# Patient Record
Sex: FEMALE | Race: ASIAN | Employment: FULL TIME | URBAN - METROPOLITAN AREA
[De-identification: names, ages, dates, MRNs, and addresses within clinical notes are randomized per-mention and may not be internally consistent; named-entity substitution may affect disease eponyms.]

---

## 2017-03-15 ENCOUNTER — EMPLOYEE WELLNESS (OUTPATIENT)
Dept: OTHER | Age: 32
End: 2017-03-15

## 2017-03-15 LAB
CHOLESTEROL/HDL RATIO: 4.4
CHOLESTEROL: 202 MG/DL
GLUCOSE BLD-MCNC: 93 MG/DL (ref 70–99)
HDLC SERPL-MCNC: 46 MG/DL
LDL CHOLESTEROL: 119 MG/DL (ref 0–130)
PATIENT FASTING?: YES
TRIGL SERPL-MCNC: 183 MG/DL
VLDLC SERPL CALC-MCNC: ABNORMAL MG/DL (ref 1–30)

## 2017-09-08 ENCOUNTER — OFFICE VISIT (OUTPATIENT)
Dept: OBGYN CLINIC | Age: 32
End: 2017-09-08
Payer: COMMERCIAL

## 2017-09-08 ENCOUNTER — HOSPITAL ENCOUNTER (OUTPATIENT)
Age: 32
Setting detail: SPECIMEN
Discharge: HOME | End: 2017-09-08
Payer: COMMERCIAL

## 2017-09-08 VITALS
DIASTOLIC BLOOD PRESSURE: 80 MMHG | SYSTOLIC BLOOD PRESSURE: 125 MMHG | HEIGHT: 62 IN | HEART RATE: 85 BPM | WEIGHT: 125 LBS | BODY MASS INDEX: 23 KG/M2

## 2017-09-08 DIAGNOSIS — Z32.01 POSITIVE PREGNANCY TEST: ICD-10-CM

## 2017-09-08 DIAGNOSIS — N91.2 AMENORRHEA: ICD-10-CM

## 2017-09-08 DIAGNOSIS — N91.2 AMENORRHEA: Primary | ICD-10-CM

## 2017-09-08 LAB
-: ABNORMAL
AMORPHOUS: ABNORMAL
AMPHETAMINE SCREEN URINE: NEGATIVE
BACTERIA: ABNORMAL
BARBITURATE SCREEN URINE: NEGATIVE
BENZODIAZEPINE SCREEN, URINE: NEGATIVE
BILIRUBIN URINE: NEGATIVE
BUPRENORPHINE URINE: NORMAL
CANNABINOID SCREEN URINE: NEGATIVE
CASTS UA: ABNORMAL /LPF (ref 0–2)
COCAINE METABOLITE, URINE: NEGATIVE
COLOR: ABNORMAL
COMMENT UA: ABNORMAL
CONTROL: ABNORMAL
CRYSTALS, UA: ABNORMAL /HPF
DIRECT EXAM: NORMAL
EPITHELIAL CELLS UA: ABNORMAL /HPF (ref 0–5)
GLUCOSE URINE: NEGATIVE
KETONES, URINE: ABNORMAL
LEUKOCYTE ESTERASE, URINE: ABNORMAL
Lab: NORMAL
MDMA URINE: NORMAL
METHADONE SCREEN, URINE: NEGATIVE
METHAMPHETAMINE, URINE: NORMAL
MUCUS: ABNORMAL
NITRITE, URINE: NEGATIVE
OPIATES, URINE: NEGATIVE
OTHER OBSERVATIONS UA: ABNORMAL
OXYCODONE SCREEN URINE: NEGATIVE
PH UA: 5.5 (ref 5–8)
PHENCYCLIDINE, URINE: NEGATIVE
PREGNANCY TEST URINE, POC: ABNORMAL
PROPOXYPHENE, URINE: NORMAL
PROTEIN UA: NEGATIVE
RBC UA: ABNORMAL /HPF (ref 0–2)
RENAL EPITHELIAL, UA: ABNORMAL /HPF
SPECIFIC GRAVITY UA: 1.03 (ref 1–1.03)
SPECIMEN DESCRIPTION: NORMAL
STATUS: NORMAL
TEST INFORMATION: NORMAL
TRICHOMONAS: ABNORMAL
TRICYCLIC ANTIDEPRESSANTS, UR: NORMAL
TURBIDITY: ABNORMAL
URINE HGB: NEGATIVE
UROBILINOGEN, URINE: NORMAL
WBC UA: ABNORMAL /HPF (ref 0–5)
YEAST: ABNORMAL

## 2017-09-08 PROCEDURE — 99203 OFFICE O/P NEW LOW 30 MIN: CPT | Performed by: OBSTETRICS & GYNECOLOGY

## 2017-09-08 PROCEDURE — 81025 URINE PREGNANCY TEST: CPT | Performed by: OBSTETRICS & GYNECOLOGY

## 2017-09-09 LAB
CULTURE: NO GROWTH
CULTURE: NORMAL
Lab: NORMAL
SPECIMEN DESCRIPTION: NORMAL
STATUS: NORMAL

## 2017-09-11 LAB
C TRACH DNA GENITAL QL NAA+PROBE: NEGATIVE
N. GONORRHOEAE DNA: NEGATIVE

## 2017-09-13 ENCOUNTER — HOSPITAL ENCOUNTER (OUTPATIENT)
Dept: ULTRASOUND IMAGING | Facility: CLINIC | Age: 32
Discharge: HOME | End: 2017-09-13
Payer: COMMERCIAL

## 2017-09-13 DIAGNOSIS — Z32.01 POSITIVE PREGNANCY TEST: ICD-10-CM

## 2017-09-13 DIAGNOSIS — N91.2 AMENORRHEA: ICD-10-CM

## 2017-09-13 PROCEDURE — 76801 OB US < 14 WKS SINGLE FETUS: CPT

## 2017-09-13 PROCEDURE — 76817 TRANSVAGINAL US OBSTETRIC: CPT

## 2017-09-14 LAB — CYTOLOGY REPORT: NORMAL

## 2017-09-21 ENCOUNTER — HOSPITAL ENCOUNTER (OUTPATIENT)
Age: 32
Setting detail: SPECIMEN
Discharge: HOME | End: 2017-09-21
Payer: COMMERCIAL

## 2017-09-21 DIAGNOSIS — Z32.01 POSITIVE PREGNANCY TEST: ICD-10-CM

## 2017-09-21 DIAGNOSIS — N91.2 AMENORRHEA: ICD-10-CM

## 2017-09-21 LAB
ABO/RH: NORMAL
ABSOLUTE EOS #: 0.1 K/UL (ref 0–0.4)
ABSOLUTE LYMPH #: 2.3 K/UL (ref 1–4.8)
ABSOLUTE MONO #: 0.7 K/UL (ref 0.1–1.2)
ANTIBODY SCREEN: NEGATIVE
BASOPHILS # BLD: 1 %
BASOPHILS ABSOLUTE: 0 K/UL (ref 0–0.2)
DIFFERENTIAL TYPE: NORMAL
EOSINOPHILS RELATIVE PERCENT: 1 %
HCT VFR BLD CALC: 40.1 % (ref 36–46)
HEMOGLOBIN: 13.7 G/DL (ref 12–16)
HEPATITIS B SURFACE ANTIGEN: NONREACTIVE
HIV AG/AB: NONREACTIVE
LYMPHOCYTES # BLD: 22 %
MCH RBC QN AUTO: 32.6 PG (ref 26–34)
MCHC RBC AUTO-ENTMCNC: 34 G/DL (ref 31–37)
MCV RBC AUTO: 95.8 FL (ref 80–100)
MONOCYTES # BLD: 6 %
PDW BLD-RTO: 13 % (ref 12.5–15.4)
PLATELET # BLD: 169 K/UL (ref 140–450)
PLATELET ESTIMATE: NORMAL
PMV BLD AUTO: 10 FL (ref 6–12)
RBC # BLD: 4.19 M/UL (ref 4–5.2)
RBC # BLD: NORMAL 10*6/UL
RUBV IGG SER QL: 12.3 IU/ML
SEG NEUTROPHILS: 70 %
SEGMENTED NEUTROPHILS ABSOLUTE COUNT: 7.2 K/UL (ref 1.8–7.7)
T. PALLIDUM, IGG: NONREACTIVE
WBC # BLD: 10.3 K/UL (ref 3.5–11)
WBC # BLD: NORMAL 10*3/UL

## 2017-10-04 ENCOUNTER — INITIAL PRENATAL (OUTPATIENT)
Dept: OBGYN CLINIC | Age: 32
End: 2017-10-04

## 2017-10-04 VITALS
HEART RATE: 101 BPM | BODY MASS INDEX: 22.86 KG/M2 | SYSTOLIC BLOOD PRESSURE: 118 MMHG | DIASTOLIC BLOOD PRESSURE: 74 MMHG | WEIGHT: 125 LBS

## 2017-10-04 DIAGNOSIS — Z34.91 ENCOUNTER FOR SUPERVISION OF NORMAL PREGNANCY IN FIRST TRIMESTER, UNSPECIFIED GRAVIDITY: Primary | ICD-10-CM

## 2017-10-04 DIAGNOSIS — Z34.80 SUPERVISION OF OTHER NORMAL PREGNANCY, ANTEPARTUM: ICD-10-CM

## 2017-10-04 PROCEDURE — 0500F INITIAL PRENATAL CARE VISIT: CPT | Performed by: OBSTETRICS & GYNECOLOGY

## 2017-10-04 NOTE — PROGRESS NOTES
NOT REPORTED NONE    Other Observations UA NOT REPORTED NREQ    Yeast, UA NOT REPORTED NONE   Urine Culture    Collection Time: 09/08/17  9:00 PM   Result Value Ref Range    Specimen Description . Located within [facility] URINE     Special Requests NOT REPORTED     Culture NO GROWTH     Culture       [address] [phone]    Status FINAL 09/09/2017    VAGINITIS DNA PROBE    Collection Time: 09/08/17  9:00 PM   Result Value Ref Range    Specimen Description . VAGINA     Special Requests NOT REPORTED     Direct Exam NEGATIVE for Candida sp. Direct Exam NEGATIVE for Gardnerella vaginalis     Direct Exam NEGATIVE for Trichomonas vaginalis     Direct Exam       Method of testing is a DNA probe intended for detection and identification of    Direct Exam        Candida species, Gardnerella vaginalis, and Trichomonas vaginalis nucleic acid    Direct Exam        in vaginal fluid specimens from patients with symptoms of vaginitis/vaginosis.     Direct Exam       [address] [phone]    Status FINAL 09/08/2017    C.trachomatis N.gonorrhoeae DNA    Collection Time: 09/08/17  9:01 PM   Result Value Ref Range    C. trachomatis DNA NEGATIVE NEG    N. gonorrhoeae DNA NEGATIVE NEG   PRENATAL PROFILE I    Collection Time: 09/21/17  3:27 PM   Result Value Ref Range    WBC 10.3 3.5 - 11.0 k/uL    RBC 4.19 4.0 - 5.2 m/uL    Hemoglobin 13.7 12.0 - 16.0 g/dL    Hematocrit 40.1 36 - 46 %    MCV 95.8 80 - 100 fL    MCH 32.6 26 - 34 pg    MCHC 34.0 31 - 37 g/dL    RDW 13.0 12.5 - 15.4 %    Platelets 742 780 - 560 k/uL    MPV 10.0 6.0 - 12.0 fL    Rubella Antibody, IGG 12.3 IU/mL    Hepatitis B Surface Ag NONREACTIVE NR    Differential Type NOT REPORTED     Seg Neutrophils 70 %    Lymphocytes 22 %    Monocytes 6 %    Eosinophils % 1 %    Basophils 1 %    Segs Absolute 7.20 1.8 - 7.7 k/uL    Absolute Lymph # 2.30 1.0 - 4.8 k/uL    Absolute Mono # 0.70 0.1 - 1.2 k/uL Absolute Eos # 0.10 0.0 - 0.4 k/uL    Basophils # 0.00 0.0 - 0.2 k/uL    WBC Morphology NOT REPORTED     RBC Morphology NOT REPORTED     Platelet Estimate NOT REPORTED     T. pallidum, IgG NONREACTIVE NR   HIV Screen    Collection Time: 09/21/17  3:27 PM   Result Value Ref Range    HIV Ag/Ab NONREACTIVE NR   Prenatal type and screen    Collection Time: 09/21/17  3:27 PM   Result Value Ref Range    ABO/Rh B POSITIVE     Antibody Screen NEGATIVE        Past Medical History:   Diagnosis Date    Abnormal Pap smear 2007    [facility]    Adopted     Asthma     Tachycardia                                                                    Past Surgical History:   Procedure Laterality Date    DENTAL SURGERY      LEEP  8/2007    [facility]    TUBAL LIGATION      WRIST SURGERY       History reviewed. No pertinent family history. History   Smoking Status    Never Smoker   Smokeless Tobacco    Never Used     History   Alcohol Use No       MEDICATIONS:  Current Outpatient Prescriptions   Medication Sig Dispense Refill    Multiple Vitamin (MULTIVITAMIN PO) Take 1 tablet by mouth daily. No current facility-administered medications for this visit. ALLERGIES:  Bactrim [sulfamethoxazole-trimethoprim]    Reviewed global and practice OB care including nausea measures, nutrition, activities, warning signs, and contact information.  Offered cell free DNA screen,NT echo and WIC .    `--------------------------------------------------------------------------  Genetic Screening/Teratology Counseling  (Include patient, FOB or anyone in either family)    1) Patient's age 28 years or > at ANDREW: No  2) Thalassemia (Mediterranean, ): No  3) Neural Tube Defect:   No  4) Congenital heart defect:   Yes, nephew had surgery to close hole in heart  5) Trisomy (e.g. Down Syndrome):  No  6) Austen-sachs (Latter day, [address]): No  7) Multiple Births:    Yes, FOB's cousins  8) Sickle cell (disease or trait):  No  9) options.     Route of delivery and counseling on vaginal, operative vaginal, and  sections were completed with the risks of each to both the patient as well as her baby. The possibility of a blood transfusion was discussed as well. The patient was not opposed to receiving a transfusion if needed.     Nuchal translucency/Quad Evaluation and MSAFP single marker testing was reviewed in detail with attention to timing of testing and their windows. For patients beyond the gestational age for Nuchal translucency evaluation Quad testing was recommended. Timing for the Quad test was reviewed. Benefits of the above testing was reviewed. A second trimester amniocentesis was also made available to the patient. Risks, Benefits and non-invasive alternative testing was reviewed.      The literature regarding a questionable link to pitocin augmentation and induction of labor, the assistance of labor contractions and the initiation of contractions to help delivery, have been reviewed with the patient regarding the increased potential of having a  with Attention Deficit Hyperactivity Disorder and or Autism. These two disorders and the ramifications of their impact on a child and the family caring for that child has been reviewed with the patient in detail. She was given the risks, benefits and alternatives of the use of this medication. She has agreed to its use in the delivery of her unborn child if needed at the time of delivery, Yes.     The patient was questioned in detail regarding any genetic misnomer history, chromosomal abnormalities, or learning disabilities in herself, the father of the baby or their families.  SHE DENIED ANY HISTORY AS STATED ABOVE: Yes    -------------------------------------------------------------------------  Infection History:    1) Live with someone with TB/exposed to TB: No  2) Patient/partner has h/o genital herpes: No  3) Rash/viral illness since LMP:  No  4) History of

## 2017-10-04 NOTE — MR AVS SNAPSHOT
After Visit Summary             Frankey Clos   10/4/2017 2:30 PM   Initial Prenatal    Description:  Female : 1985   Provider:  Meet Welch MD   Department:  [facility] [place]              Your Follow-Up and Future Appointments         Below is a list of your follow-up and future appointments. This may not be a complete list as you may have made appointments directly with providers that we are not aware of or your providers may have made some for you. Please call your providers to confirm appointments. It is important to keep your appointments. Please bring your current insurance card, photo ID, co-pay, and all medication bottles to your appointment. If self-pay, payment is expected at the time of service. Your To-Do List     Future Appointments Provider Department Dept Phone    10/31/2017 3:15 PM Meet Welch MD [facility] [place] [facility] [phone]    Patients are asked to arrive 15 minutes prior to scheduled appointment time to complete paper work. Follow-Up    Return in about 4 weeks (around 2017) for NIRAV. Information from Your Visit        Department     Name Address Phone Fax    [address] [place] [address] [phone]      You Were Seen for:         Comments    Encounter for supervision of normal pregnancy in first trimester, unspecified    [[id number]]         Vital Signs     Blood Pressure Pulse Weight Last Menstrual Period Body Mass Index Smoking Status    118/74 101 125 lb (56.7 kg) 2017 22.86 kg/m2 Never Smoker         Medications and Orders      Your Current Medications Are              Multiple Vitamin (MULTIVITAMIN PO) Take 1 tablet by mouth daily.         Allergies              Bactrim [Sulfamethoxazole-trimethoprim] Hives      We Ordered/Performed the following           Kanwal Barrera MD, Maternal Fetal Medicine [place]* _____________________________________________________        Patient is under 18 - Guarantor:  No    LMP: Patient's last menstrual period was 06/25/2017. ANDREW: Estimated Date of Delivery: 4/1/18    Gestational Age: 14w3d (at time of referral)    Blood Type: [unfilled]      Multiple: No       Next visit:  Visit date not found    Comments: The patient can be scheduled with any member of the group, including the provider with the first available appointments. Additional Information        Basic Information     Date Of Birth Sex Race Ethnicity Preferred Language    1985 Female Other Non-/Non  English      Problem List as of 10/4/2017                 Supervision of other normal pregnancy, antepartum      Preventive Care        Date Due    Tetanus Combination Vaccine (1 - Tdap) 9/15/2005    Yearly Flu Vaccine (1) 9/1/2017    Pap Smear 9/8/2020            MyChart Signup           [employer] allows you to send messages to your doctor, view your test results, renew your prescriptions, schedule appointments, view visit notes, and more. How Do I Sign Up? 1. In your Internet browser, go to [email or web address]  2. Click on the Sign Up Now link in the Sign In box. You will see the New Member Sign Up page. 3. Enter your [employer] Access Code exactly as it appears below. You will not need to use this code after youve completed the sign-up process. If you do not sign up before the expiration date, you must request a new code. [employer] Access Code: [email or web address]  Expires: 11/25/2017  4:59 AM    4. Enter your Social Security Number ([id number]) and Date of Birth (mm/dd/yyyy) as indicated and click Submit. You will be taken to the next sign-up page. 5. Create a inSellyt ID. This will be your [employer] login ID and cannot be changed, so think of one that is secure and easy to remember. 6. Create a inSellyt password. You can change your password at any time. 7. Enter your Password Reset Question and Answer. This can be used at a later time if you forget your password. 8. Enter your e-mail address. You will receive e-mail notification when new information is available in [address] 9. Click Sign Up. You can now view your medical record. Additional Information  If you have questions, please contact the physician practice where you receive care. Remember, [employer] is NOT to be used for urgent needs. For medical emergencies, dial 911. For questions regarding your [employer] account call [phone]. If you have a clinical question, please call your doctor's office.

## 2017-10-31 ENCOUNTER — ROUTINE PRENATAL (OUTPATIENT)
Dept: OBGYN CLINIC | Age: 32
End: 2017-10-31
Payer: COMMERCIAL

## 2017-10-31 VITALS
HEART RATE: 89 BPM | BODY MASS INDEX: 23.16 KG/M2 | SYSTOLIC BLOOD PRESSURE: 116 MMHG | WEIGHT: 126.6 LBS | DIASTOLIC BLOOD PRESSURE: 70 MMHG

## 2017-10-31 DIAGNOSIS — O26.892 HEARTBURN DURING PREGNANCY IN SECOND TRIMESTER: Primary | ICD-10-CM

## 2017-10-31 DIAGNOSIS — R12 HEARTBURN DURING PREGNANCY IN SECOND TRIMESTER: Primary | ICD-10-CM

## 2017-10-31 PROCEDURE — 99213 OFFICE O/P EST LOW 20 MIN: CPT | Performed by: OBSTETRICS & GYNECOLOGY

## 2017-10-31 RX ORDER — FAMOTIDINE 20 MG/1
20 TABLET, FILM COATED ORAL 2 TIMES DAILY
Qty: 60 TABLET | Refills: 3 | Status: SHIPPED | OUTPATIENT
Start: 2017-10-31 | End: 2021-09-14

## 2017-11-20 ENCOUNTER — ROUTINE PRENATAL (OUTPATIENT)
Dept: PERINATAL CARE | Age: 32
End: 2017-11-20
Payer: COMMERCIAL

## 2017-11-20 VITALS
HEART RATE: 107 BPM | BODY MASS INDEX: 24.75 KG/M2 | TEMPERATURE: 98.3 F | DIASTOLIC BLOOD PRESSURE: 77 MMHG | RESPIRATION RATE: 16 BRPM | HEIGHT: 62 IN | SYSTOLIC BLOOD PRESSURE: 123 MMHG | WEIGHT: 134.5 LBS

## 2017-11-20 DIAGNOSIS — Z3A.21 21 WEEKS GESTATION OF PREGNANCY: ICD-10-CM

## 2017-11-20 DIAGNOSIS — Z98.890 HISTORY OF CERVICAL LEEP BIOPSY AFFECTING CARE OF MOTHER, ANTEPARTUM: ICD-10-CM

## 2017-11-20 DIAGNOSIS — O44.40 LOW IMPLANTATION OF PLACENTA WITHOUT HEMORRHAGE: Primary | ICD-10-CM

## 2017-11-20 DIAGNOSIS — O34.40 HISTORY OF CERVICAL LEEP BIOPSY AFFECTING CARE OF MOTHER, ANTEPARTUM: ICD-10-CM

## 2017-11-20 PROCEDURE — 76817 TRANSVAGINAL US OBSTETRIC: CPT | Performed by: OBSTETRICS & GYNECOLOGY

## 2017-11-20 PROCEDURE — 76811 OB US DETAILED SNGL FETUS: CPT | Performed by: OBSTETRICS & GYNECOLOGY

## 2017-11-20 NOTE — PATIENT INSTRUCTIONS
Pt to follow up with referring physician\Thank you for enrolling in [address] Please follow the instructions below to securely access your online medical record. [employer] allows you to send messages to your doctor, view your test results, renew your prescriptions, schedule appointments, and more. How Do I Sign Up? 1. In your Internet browser, go to [email or web address]  2. Click on the Sign Up Now link in the Sign In box. You will see the New Member Sign Up page. 3. Enter your [employer] Access Code exactly as it appears below. You will not need to use this code after youve completed the sign-up process. If you do not sign up before the expiration date, you must request a new code. [employer] Access Code: [email or web address]  Expires: 1/19/2018  5:00 AM    4. Enter your Social Security Number ([id number]) and Date of Birth (mm/dd/yyyy) as indicated and click Submit. You will be taken to the next sign-up page. 5. Create a [employer] ID. This will be your [employer] login ID and cannot be changed, so think of one that is secure and easy to remember. 6. Create a [employer] password. You can change your password at any time. 7. Enter your Password Reset Question and Answer. This can be used at a later time if you forget your password. 8. Enter your e-mail address. You will receive e-mail notification when new information is available in [address] 9. Click Sign Up. You can now view your medical record. Additional Information  If you have questions, please contact your physician practice where you receive care. Remember, [employer] is NOT to be used for urgent needs. For medical emergencies, dial 911.

## 2017-11-21 ENCOUNTER — ROUTINE PRENATAL (OUTPATIENT)
Dept: OBGYN CLINIC | Age: 32
End: 2017-11-21

## 2017-11-21 VITALS
SYSTOLIC BLOOD PRESSURE: 119 MMHG | HEART RATE: 90 BPM | WEIGHT: 131 LBS | BODY MASS INDEX: 23.96 KG/M2 | DIASTOLIC BLOOD PRESSURE: 69 MMHG

## 2017-11-21 DIAGNOSIS — Z34.92 PRENATAL CARE IN SECOND TRIMESTER: Primary | ICD-10-CM

## 2017-11-21 PROCEDURE — 0502F SUBSEQUENT PRENATAL CARE: CPT | Performed by: OBSTETRICS & GYNECOLOGY

## 2017-12-05 ENCOUNTER — ROUTINE PRENATAL (OUTPATIENT)
Dept: PERINATAL CARE | Age: 32
End: 2017-12-05
Payer: COMMERCIAL

## 2017-12-05 VITALS
SYSTOLIC BLOOD PRESSURE: 126 MMHG | DIASTOLIC BLOOD PRESSURE: 76 MMHG | TEMPERATURE: 98.4 F | HEART RATE: 102 BPM | WEIGHT: 136 LBS | RESPIRATION RATE: 18 BRPM | BODY MASS INDEX: 24.87 KG/M2

## 2017-12-05 DIAGNOSIS — O34.40 HISTORY OF CERVICAL LEEP BIOPSY AFFECTING CARE OF MOTHER, ANTEPARTUM: Primary | ICD-10-CM

## 2017-12-05 DIAGNOSIS — Z98.890 HISTORY OF CERVICAL LEEP BIOPSY AFFECTING CARE OF MOTHER, ANTEPARTUM: Primary | ICD-10-CM

## 2017-12-05 DIAGNOSIS — O44.40 LOW IMPLANTATION OF PLACENTA WITHOUT HEMORRHAGE: ICD-10-CM

## 2017-12-05 DIAGNOSIS — Z3A.23 23 WEEKS GESTATION OF PREGNANCY: ICD-10-CM

## 2017-12-05 PROCEDURE — 76817 TRANSVAGINAL US OBSTETRIC: CPT | Performed by: OBSTETRICS & GYNECOLOGY

## 2017-12-19 ENCOUNTER — ROUTINE PRENATAL (OUTPATIENT)
Dept: OBGYN CLINIC | Age: 32
End: 2017-12-19

## 2017-12-19 VITALS
SYSTOLIC BLOOD PRESSURE: 125 MMHG | HEART RATE: 108 BPM | BODY MASS INDEX: 25.06 KG/M2 | DIASTOLIC BLOOD PRESSURE: 73 MMHG | WEIGHT: 137 LBS

## 2017-12-19 DIAGNOSIS — Z34.80 SUPERVISION OF OTHER NORMAL PREGNANCY, ANTEPARTUM: Primary | ICD-10-CM

## 2017-12-19 PROCEDURE — 0502F SUBSEQUENT PRENATAL CARE: CPT | Performed by: OBSTETRICS & GYNECOLOGY

## 2017-12-19 NOTE — PROGRESS NOTES
Natasha Estevez is a  @ 25w2d who presents for NIRAV visit. She denies LOF, VB or Ctxs.  + FM. She denies any complaints.      O:  Vitals:    17 1500   BP: 125/73   Pulse: 108     Gen: NAD  Abd: soft, nontender, gravid  Ext:  no edema    FHT: 150  FH: 24 cm    A/P:  Patient Active Problem List   Diagnosis    Supervision of other normal pregnancy, antepartum    Low implantation of placenta without hemorrhage    History of cervical LEEP biopsy affecting care of mother, antepartum     Discussed s/sx that should prompt call to the office  Discussed kiдмитрий conte  RTC in 4 wks    Ruben Mantilla MD

## 2017-12-20 ENCOUNTER — ROUTINE PRENATAL (OUTPATIENT)
Dept: PERINATAL CARE | Age: 32
End: 2017-12-20
Payer: COMMERCIAL

## 2017-12-20 VITALS
TEMPERATURE: 98.1 F | RESPIRATION RATE: 16 BRPM | SYSTOLIC BLOOD PRESSURE: 127 MMHG | HEART RATE: 99 BPM | WEIGHT: 136 LBS | DIASTOLIC BLOOD PRESSURE: 74 MMHG | BODY MASS INDEX: 24.87 KG/M2

## 2017-12-20 DIAGNOSIS — Z36.4 ANTENATAL SCREENING FOR FETAL GROWTH RETARDATION USING ULTRASONICS: ICD-10-CM

## 2017-12-20 DIAGNOSIS — Z3A.25 25 WEEKS GESTATION OF PREGNANCY: ICD-10-CM

## 2017-12-20 DIAGNOSIS — Z98.890 HISTORY OF CERVICAL LEEP BIOPSY AFFECTING CARE OF MOTHER, ANTEPARTUM: ICD-10-CM

## 2017-12-20 DIAGNOSIS — O34.40 HISTORY OF CERVICAL LEEP BIOPSY AFFECTING CARE OF MOTHER, ANTEPARTUM: ICD-10-CM

## 2017-12-20 DIAGNOSIS — O44.40 LOW IMPLANTATION OF PLACENTA WITHOUT HEMORRHAGE: Primary | ICD-10-CM

## 2017-12-20 PROCEDURE — 76816 OB US FOLLOW-UP PER FETUS: CPT | Performed by: OBSTETRICS & GYNECOLOGY

## 2017-12-20 PROCEDURE — 76817 TRANSVAGINAL US OBSTETRIC: CPT | Performed by: OBSTETRICS & GYNECOLOGY

## 2018-01-13 ENCOUNTER — HOSPITAL ENCOUNTER (OUTPATIENT)
Facility: CLINIC | Age: 33
Discharge: HOME | End: 2018-01-13
Payer: COMMERCIAL

## 2018-01-13 DIAGNOSIS — Z34.80 SUPERVISION OF OTHER NORMAL PREGNANCY, ANTEPARTUM: ICD-10-CM

## 2018-01-13 LAB
ABSOLUTE EOS #: 0.09 K/UL (ref 0–0.44)
ABSOLUTE IMMATURE GRANULOCYTE: 0.08 K/UL (ref 0–0.3)
ABSOLUTE LYMPH #: 2.17 K/UL (ref 1.1–3.7)
ABSOLUTE MONO #: 0.55 K/UL (ref 0.1–1.2)
BASOPHILS # BLD: 0 % (ref 0–2)
BASOPHILS ABSOLUTE: 0.03 K/UL (ref 0–0.2)
DIFFERENTIAL TYPE: ABNORMAL
EOSINOPHILS RELATIVE PERCENT: 1 % (ref 1–4)
GLUCOSE ADMINISTRATION: ABNORMAL
GLUCOSE TOLERANCE SCREEN 50G: 184 MG/DL (ref 70–135)
HCT VFR BLD CALC: 38 % (ref 36.3–47.1)
HEMOGLOBIN: 12.8 G/DL (ref 11.9–15.1)
IMMATURE GRANULOCYTES: 1 %
LYMPHOCYTES # BLD: 20 % (ref 24–43)
MCH RBC QN AUTO: 32.6 PG (ref 25.2–33.5)
MCHC RBC AUTO-ENTMCNC: 33.7 G/DL (ref 28.4–34.8)
MCV RBC AUTO: 96.7 FL (ref 82.6–102.9)
MONOCYTES # BLD: 5 % (ref 3–12)
PDW BLD-RTO: 11.9 % (ref 11.8–14.4)
PLATELET # BLD: 122 K/UL (ref 138–453)
PLATELET ESTIMATE: ABNORMAL
PMV BLD AUTO: 11.7 FL (ref 8.1–13.5)
RBC # BLD: 3.93 M/UL (ref 3.95–5.11)
RBC # BLD: ABNORMAL 10*6/UL
SEG NEUTROPHILS: 73 % (ref 36–65)
SEGMENTED NEUTROPHILS ABSOLUTE COUNT: 8.16 K/UL (ref 1.5–8.1)
WBC # BLD: 11.1 K/UL (ref 3.5–11.3)
WBC # BLD: ABNORMAL 10*3/UL

## 2018-01-13 PROCEDURE — 85025 COMPLETE CBC W/AUTO DIFF WBC: CPT

## 2018-01-13 PROCEDURE — 82950 GLUCOSE TEST: CPT

## 2018-01-13 PROCEDURE — 36415 COLL VENOUS BLD VENIPUNCTURE: CPT

## 2018-01-15 DIAGNOSIS — O99.810 ABNORMAL GLUCOSE TOLERANCE TEST (GTT) DURING PREGNANCY, ANTEPARTUM: Primary | ICD-10-CM

## 2018-01-16 ENCOUNTER — ROUTINE PRENATAL (OUTPATIENT)
Dept: OBGYN CLINIC | Age: 33
End: 2018-01-16

## 2018-01-16 VITALS
BODY MASS INDEX: 25.97 KG/M2 | DIASTOLIC BLOOD PRESSURE: 74 MMHG | SYSTOLIC BLOOD PRESSURE: 114 MMHG | HEART RATE: 85 BPM | WEIGHT: 142 LBS

## 2018-01-16 DIAGNOSIS — Z34.93 PRENATAL CARE IN THIRD TRIMESTER: Primary | ICD-10-CM

## 2018-01-16 PROCEDURE — 0502F SUBSEQUENT PRENATAL CARE: CPT | Performed by: OBSTETRICS & GYNECOLOGY

## 2018-01-20 ENCOUNTER — HOSPITAL ENCOUNTER (OUTPATIENT)
Facility: CLINIC | Age: 33
Discharge: HOME | End: 2018-01-20
Payer: COMMERCIAL

## 2018-01-20 DIAGNOSIS — O99.810 ABNORMAL GLUCOSE TOLERANCE TEST (GTT) DURING PREGNANCY, ANTEPARTUM: ICD-10-CM

## 2018-01-20 LAB
3 HR GLUCOSE: 108 MG/DL (ref 65–139)
AMOUNT GLUCOSE GIVEN: 100 G
GLUCOSE FASTING: 88 MG/DL (ref 65–94)
GLUCOSE TOLERANCE TEST 1 HOUR: 212 MG/DL (ref 65–179)
GLUCOSE TOLERANCE TEST 2 HOUR: 157 MG/DL (ref 65–154)

## 2018-01-20 PROCEDURE — 82952 GTT-ADDED SAMPLES: CPT

## 2018-01-20 PROCEDURE — 82951 GLUCOSE TOLERANCE TEST (GTT): CPT

## 2018-01-20 PROCEDURE — 36415 COLL VENOUS BLD VENIPUNCTURE: CPT

## 2018-01-22 DIAGNOSIS — O24.410 DIET CONTROLLED GESTATIONAL DIABETES MELLITUS (GDM) IN THIRD TRIMESTER: Primary | ICD-10-CM

## 2018-01-24 ENCOUNTER — TELEPHONE (OUTPATIENT)
Dept: PERINATAL CARE | Age: 33
End: 2018-01-24

## 2018-01-24 NOTE — TELEPHONE ENCOUNTER
Rx called into the pharmacy for a glucometer, and supplies pt is to check her sugars 4 x a day with a month supply and 3 refills. Rx called in by NADEEM RANDALL

## 2018-01-31 ENCOUNTER — ROUTINE PRENATAL (OUTPATIENT)
Dept: OBGYN CLINIC | Age: 33
End: 2018-01-31

## 2018-01-31 ENCOUNTER — HOSPITAL ENCOUNTER (OUTPATIENT)
Dept: DIABETES SERVICES | Age: 33
Setting detail: THERAPIES SERIES
Discharge: HOME | End: 2018-01-31
Payer: COMMERCIAL

## 2018-01-31 VITALS
HEART RATE: 104 BPM | BODY MASS INDEX: 25.81 KG/M2 | SYSTOLIC BLOOD PRESSURE: 120 MMHG | WEIGHT: 141.09 LBS | DIASTOLIC BLOOD PRESSURE: 78 MMHG

## 2018-01-31 VITALS
HEART RATE: 121 BPM | SYSTOLIC BLOOD PRESSURE: 136 MMHG | DIASTOLIC BLOOD PRESSURE: 83 MMHG | BODY MASS INDEX: 25.79 KG/M2 | WEIGHT: 141 LBS

## 2018-01-31 DIAGNOSIS — Z34.93 PRENATAL CARE IN THIRD TRIMESTER: Primary | ICD-10-CM

## 2018-01-31 DIAGNOSIS — O24.410 DIET CONTROLLED GESTATIONAL DIABETES MELLITUS (GDM) IN THIRD TRIMESTER: ICD-10-CM

## 2018-01-31 PROCEDURE — G0108 DIAB MANAGE TRN  PER INDIV: HCPCS

## 2018-01-31 PROCEDURE — 0502F SUBSEQUENT PRENATAL CARE: CPT | Performed by: OBSTETRICS & GYNECOLOGY

## 2018-01-31 NOTE — PROGRESS NOTES
strategies. 1 1/31/18CB   Denies any Sx   Describe sick day guidelines & indications for  physician notification. Identify short term consequences of poor control. Prevention, detection & treatment of chronic complications:  Define the natural course of diabetes & describe the relationship of blood glucose levels to long term complications of diabetes. Identify preventative measures & standards of care. 1 1/31/18CB    Risk of type 2 DM for mother post pregnancy. Risks of high BG for Mom and baby reviewed. Developing strategies to address psychosocial issues:  Describe feelings about living with diabetes; Describe how stress, depression & anxiety affect blood glucose; Identify coping strategies; Identify support needed & support network available. 1 1/31/18CB   Expressed no worries at this time and will try to make changes and increase activity by walking dogs. 1/31/18CB   Developing strategies to promote health/change behavior: Identify 7 self-care behaviors; Personal health risk factors; Benefits, challenges & strategies for behavioral change;    1 1/31/18CB        Individualized goal selection. 1/31/18CB    My goal , to help me improve my health, I will:   1. Eat 3 meals and 3 snacks daily      2.  Check BG fasting and 2 hour pp and meals , record and take to follow -up appointments/ fax weekly to M       Identified Barriers to learning/adherence to self management plan:     [x]None  []Physical  []Visual  []Hearing  []Speech  []Emotional  []Cognitive    []Reading  []Language  []Accessibility  []Financial    Instruction Method: [x]Lecture/Discussion  []Power Point Presentation  [x]Handouts  []Return Demonstration      Education Materials/Equipment Provided:    []Self-Management - Initial assessment - Enrolment in to ADA  Where do I Begin, Living with Type 2 diabetes ADA home support program and handout for no concentrated sweets and diet meal planning basics, handout on diabetes

## 2018-01-31 NOTE — PROGRESS NOTES
Sue Roberts is a  @ 31w3d who presents for NIRAV visit. She denies LOF, VB or Ctxs.  + FM. She is doing well without any complaints.      O:  Vitals:    18 1124   BP: 136/83   Pulse: 121     Gen: NAD  Abd: soft, nontender, gravid  Ext:  No edema    FHT: 150  FH: 31 cm    A/P:  Patient Active Problem List   Diagnosis    Supervision of other normal pregnancy, antepartum    Low implantation of placenta without hemorrhage    Diet controlled gestational diabetes mellitus (GDM) in third trimester   Pt has diabetic education today  Discussed s/sx that should prompt call to the office  Discussed jimmy conte  RTC in 2 wks    Blanka Edward MD

## 2018-02-07 ENCOUNTER — HOSPITAL ENCOUNTER (OUTPATIENT)
Dept: DIABETES SERVICES | Age: 33
Setting detail: THERAPIES SERIES
Discharge: HOME | End: 2018-02-07
Payer: COMMERCIAL

## 2018-02-07 DIAGNOSIS — O24.410 DIET CONTROLLED GESTATIONAL DIABETES MELLITUS (GDM) IN THIRD TRIMESTER: Primary | ICD-10-CM

## 2018-02-07 PROCEDURE — G0108 DIAB MANAGE TRN  PER INDIV: HCPCS

## 2018-02-07 NOTE — PROGRESS NOTES
Nutrition and diabetes          Class 3 - Preventing Complications         Class 4 -  In depth Nutrition and sick day care        Class 5 - 3 month follow up / goal reassessment        Gestational - RN  1/31/18CB 3:00 4:00 x    Gestational - RD 2/7/18 JW 3:00 4:00 x    Individual MNT         Shared Med Appt         Yearly Follow-up        Meter Instrx        Insulin Instrx      []Pen  []Vial & Syringe      DSMS Support Plan:  Follow-up plan:   [] MNT referral request / Appointment    [] Annual update referral request and appointment  after      []ADA  Where do I Begin, Living with Type 2 diabetes ADA home support program     [] AADE Behavioral Goal tracker for mobile phone and tables     [] Internet web sites - [employer]    []  Support Group / Portia Molina [place] [phone]      []   Followap application  / scholarship program       Post Education Referrals:       []  [facility]  Management program   []Podiatry services at [facility] / Dr Greta Calderon and or Dr. Isrrael Martin   [] Luan Robert [address] by [facility] or [facility]   [] Dental care - [facility] or [facility] dental clinics    []Wound Care    []Other  Lazarus Reno, RD, LD

## 2018-02-09 ENCOUNTER — ROUTINE PRENATAL (OUTPATIENT)
Dept: PERINATAL CARE | Age: 33
End: 2018-02-09
Payer: COMMERCIAL

## 2018-02-09 VITALS
WEIGHT: 141 LBS | SYSTOLIC BLOOD PRESSURE: 121 MMHG | HEIGHT: 62 IN | RESPIRATION RATE: 16 BRPM | HEART RATE: 109 BPM | BODY MASS INDEX: 25.95 KG/M2 | DIASTOLIC BLOOD PRESSURE: 79 MMHG | TEMPERATURE: 98.3 F

## 2018-02-09 DIAGNOSIS — O24.419 GESTATIONAL DIABETES MELLITUS (GDM), ANTEPARTUM, GESTATIONAL DIABETES METHOD OF CONTROL UNSPECIFIED: Primary | ICD-10-CM

## 2018-02-09 DIAGNOSIS — Z3A.32 32 WEEKS GESTATION OF PREGNANCY: ICD-10-CM

## 2018-02-09 PROCEDURE — 99242 OFF/OP CONSLTJ NEW/EST SF 20: CPT | Performed by: OBSTETRICS & GYNECOLOGY

## 2018-02-12 ENCOUNTER — ROUTINE PRENATAL (OUTPATIENT)
Dept: OBGYN CLINIC | Age: 33
End: 2018-02-12

## 2018-02-12 VITALS
DIASTOLIC BLOOD PRESSURE: 80 MMHG | SYSTOLIC BLOOD PRESSURE: 113 MMHG | WEIGHT: 142.2 LBS | HEART RATE: 96 BPM | BODY MASS INDEX: 26.01 KG/M2

## 2018-02-12 DIAGNOSIS — Z34.93 PRENATAL CARE IN THIRD TRIMESTER: Primary | ICD-10-CM

## 2018-02-12 PROCEDURE — 0502F SUBSEQUENT PRENATAL CARE: CPT | Performed by: OBSTETRICS & GYNECOLOGY

## 2018-02-12 NOTE — PROGRESS NOTES
Ena Goff is a  @ 33w1d who presents for NIRAV visit. She denies LOF, VB or Ctxs.  + FM. She denies any complaints.       O:  Vitals:    18 1350   BP: 113/80   Pulse: 96     Gen: NAD  Abd: soft, nontender, gravid  Ext:  mild edema    FHT: 155  FH: 32 cm    A/P:  Patient Active Problem List   Diagnosis    Supervision of other normal pregnancy, antepartum    Low implantation of placenta without hemorrhage    Diet controlled gestational diabetes mellitus (GDM) in third trimester     Discussed s/sx that should prompt call to the office  Discussed jimmy conte  RTC in 2 wks    Jose Angel Pierson MD

## 2018-02-14 ENCOUNTER — ROUTINE PRENATAL (OUTPATIENT)
Dept: PERINATAL CARE | Age: 33
End: 2018-02-14
Payer: COMMERCIAL

## 2018-02-14 VITALS
HEART RATE: 101 BPM | WEIGHT: 143 LBS | BODY MASS INDEX: 26.16 KG/M2 | DIASTOLIC BLOOD PRESSURE: 79 MMHG | RESPIRATION RATE: 16 BRPM | TEMPERATURE: 97.8 F | SYSTOLIC BLOOD PRESSURE: 127 MMHG

## 2018-02-14 DIAGNOSIS — O44.40 LOW IMPLANTATION OF PLACENTA WITHOUT HEMORRHAGE: ICD-10-CM

## 2018-02-14 DIAGNOSIS — Z3A.33 33 WEEKS GESTATION OF PREGNANCY: ICD-10-CM

## 2018-02-14 DIAGNOSIS — Z03.72 SUSPECTED PLACENTAL PROBLEM NOT FOUND: ICD-10-CM

## 2018-02-14 DIAGNOSIS — Z13.89 ENCOUNTER FOR ROUTINE SCREENING FOR MALFORMATION USING ULTRASONICS: ICD-10-CM

## 2018-02-14 DIAGNOSIS — Z36.4 ANTENATAL SCREENING FOR FETAL GROWTH RETARDATION USING ULTRASONICS: ICD-10-CM

## 2018-02-14 DIAGNOSIS — O24.410 DIET CONTROLLED GESTATIONAL DIABETES MELLITUS (GDM) IN THIRD TRIMESTER: Primary | ICD-10-CM

## 2018-02-14 LAB
ABDOMINAL CIRCUMFERENCE: NORMAL CM
BIPARIETAL DIAMETER: NORMAL CM
ESTIMATED FETAL WEIGHT: NORMAL GRAMS
FEMORAL DIAMETER: NORMAL CM
HC/AC: NORMAL
HEAD CIRCUMFERENCE: NORMAL CM

## 2018-02-14 PROCEDURE — 76805 OB US >/= 14 WKS SNGL FETUS: CPT | Performed by: OBSTETRICS & GYNECOLOGY

## 2018-02-14 PROCEDURE — 76817 TRANSVAGINAL US OBSTETRIC: CPT | Performed by: OBSTETRICS & GYNECOLOGY

## 2018-02-14 PROCEDURE — 76819 FETAL BIOPHYS PROFIL W/O NST: CPT | Performed by: OBSTETRICS & GYNECOLOGY

## 2018-02-14 PROCEDURE — 99211 OFF/OP EST MAY X REQ PHY/QHP: CPT | Performed by: OBSTETRICS & GYNECOLOGY

## 2018-02-28 ENCOUNTER — ROUTINE PRENATAL (OUTPATIENT)
Dept: OBGYN CLINIC | Age: 33
End: 2018-02-28

## 2018-02-28 VITALS
SYSTOLIC BLOOD PRESSURE: 119 MMHG | HEART RATE: 95 BPM | BODY MASS INDEX: 26.78 KG/M2 | DIASTOLIC BLOOD PRESSURE: 74 MMHG | WEIGHT: 146.4 LBS

## 2018-02-28 DIAGNOSIS — Z34.93 PRENATAL CARE IN THIRD TRIMESTER: Primary | ICD-10-CM

## 2018-02-28 PROCEDURE — 0502F SUBSEQUENT PRENATAL CARE: CPT | Performed by: OBSTETRICS & GYNECOLOGY

## 2018-02-28 NOTE — PROGRESS NOTES
Macarena Tavera is a  @ 35w3d who presents for NIRAV visit. She denies LOF, VB or Ctxs.  + FM.   She denies any complaints, but says she is starting to feel more pregnant    O:  Vitals:    18 1504   BP: 119/74   Pulse: 95     Gen: NAD  Abd: soft, nontender, gravid  Ext:  no edema    FHT: 145  FH: 34 cm    A/P:  Patient Active Problem List   Diagnosis    Supervision of other normal pregnancy, antepartum    Low implantation of placenta without hemorrhage    Diet controlled gestational diabetes mellitus (GDM) in third trimester    Suspected placental problem not found     Discussed s/sx that should prompt call to the office  Discussed kiдмитрий conte  RTC in 1 wks    Dilma Benavides MD

## 2018-03-06 ENCOUNTER — HOSPITAL ENCOUNTER (OUTPATIENT)
Age: 33
Setting detail: SPECIMEN
Discharge: HOME | End: 2018-03-06
Payer: COMMERCIAL

## 2018-03-06 ENCOUNTER — ROUTINE PRENATAL (OUTPATIENT)
Dept: OBGYN CLINIC | Age: 33
End: 2018-03-06

## 2018-03-06 VITALS
BODY MASS INDEX: 26.89 KG/M2 | DIASTOLIC BLOOD PRESSURE: 80 MMHG | HEART RATE: 118 BPM | WEIGHT: 147 LBS | SYSTOLIC BLOOD PRESSURE: 128 MMHG

## 2018-03-06 DIAGNOSIS — Z34.80 SUPERVISION OF OTHER NORMAL PREGNANCY, ANTEPARTUM: ICD-10-CM

## 2018-03-06 DIAGNOSIS — Z34.80 SUPERVISION OF OTHER NORMAL PREGNANCY, ANTEPARTUM: Primary | ICD-10-CM

## 2018-03-06 PROCEDURE — 0502F SUBSEQUENT PRENATAL CARE: CPT | Performed by: OBSTETRICS & GYNECOLOGY

## 2018-03-09 LAB
CULTURE: NORMAL
CULTURE: NORMAL
Lab: NORMAL
SPECIMEN DESCRIPTION: NORMAL
STATUS: NORMAL

## 2018-03-13 ENCOUNTER — ROUTINE PRENATAL (OUTPATIENT)
Dept: OBGYN CLINIC | Age: 33
End: 2018-03-13

## 2018-03-13 VITALS — DIASTOLIC BLOOD PRESSURE: 80 MMHG | BODY MASS INDEX: 27.07 KG/M2 | SYSTOLIC BLOOD PRESSURE: 124 MMHG | WEIGHT: 148 LBS

## 2018-03-13 DIAGNOSIS — Z3A.37 37 WEEKS GESTATION OF PREGNANCY: Primary | ICD-10-CM

## 2018-03-13 DIAGNOSIS — O24.410 DIET CONTROLLED GESTATIONAL DIABETES MELLITUS (GDM) IN THIRD TRIMESTER: ICD-10-CM

## 2018-03-13 PROCEDURE — 0502F SUBSEQUENT PRENATAL CARE: CPT | Performed by: OBSTETRICS & GYNECOLOGY

## 2018-03-14 ENCOUNTER — ROUTINE PRENATAL (OUTPATIENT)
Dept: PERINATAL CARE | Age: 33
End: 2018-03-14
Payer: COMMERCIAL

## 2018-03-14 ENCOUNTER — HOSPITAL ENCOUNTER (INPATIENT)
Age: 33
LOS: 2 days | Discharge: HOME | End: 2018-03-17
Attending: OBSTETRICS & GYNECOLOGY | Admitting: OBSTETRICS & GYNECOLOGY
Payer: COMMERCIAL

## 2018-03-14 VITALS
TEMPERATURE: 97.6 F | RESPIRATION RATE: 18 BRPM | DIASTOLIC BLOOD PRESSURE: 90 MMHG | HEART RATE: 98 BPM | BODY MASS INDEX: 27.42 KG/M2 | WEIGHT: 149 LBS | HEIGHT: 62 IN | SYSTOLIC BLOOD PRESSURE: 148 MMHG

## 2018-03-14 DIAGNOSIS — O16.3 ELEVATED BLOOD PRESSURE COMPLICATING PREGNANCY, ANTEPARTUM, THIRD TRIMESTER: Primary | ICD-10-CM

## 2018-03-14 DIAGNOSIS — Z36.4 ANTENATAL SCREENING FOR FETAL GROWTH RETARDATION USING ULTRASONICS: ICD-10-CM

## 2018-03-14 DIAGNOSIS — Z3A.37 37 WEEKS GESTATION OF PREGNANCY: ICD-10-CM

## 2018-03-14 DIAGNOSIS — O24.410 DIET CONTROLLED GESTATIONAL DIABETES MELLITUS (GDM), ANTEPARTUM: ICD-10-CM

## 2018-03-14 PROBLEM — O09.93 HRP (HIGH RISK PREGNANCY), THIRD TRIMESTER: Status: ACTIVE | Noted: 2018-03-14

## 2018-03-14 LAB
-: ABNORMAL
ABO/RH: NORMAL
ABSOLUTE EOS #: 0.06 K/UL (ref 0–0.44)
ABSOLUTE IMMATURE GRANULOCYTE: 0.12 K/UL (ref 0–0.3)
ABSOLUTE LYMPH #: 2.66 K/UL (ref 1.1–3.7)
ABSOLUTE MONO #: 0.75 K/UL (ref 0.1–1.2)
ALBUMIN SERPL-MCNC: 3.4 G/DL (ref 3.5–5.2)
ALBUMIN/GLOBULIN RATIO: 1.1 (ref 1–2.5)
ALP BLD-CCNC: 144 U/L (ref 35–104)
ALT SERPL-CCNC: 13 U/L (ref 5–33)
AMORPHOUS: ABNORMAL
AMPHETAMINE SCREEN URINE: NEGATIVE
ANION GAP SERPL CALCULATED.3IONS-SCNC: 16 MMOL/L (ref 9–17)
ANTIBODY SCREEN: NEGATIVE
ARM BAND NUMBER: NORMAL
AST SERPL-CCNC: 20 U/L
BACTERIA: ABNORMAL
BARBITURATE SCREEN URINE: NEGATIVE
BASOPHILS # BLD: 0 % (ref 0–2)
BASOPHILS ABSOLUTE: 0.03 K/UL (ref 0–0.2)
BENZODIAZEPINE SCREEN, URINE: NEGATIVE
BILIRUB SERPL-MCNC: 0.6 MG/DL (ref 0.3–1.2)
BILIRUBIN URINE: NEGATIVE
BUN BLDV-MCNC: 11 MG/DL (ref 6–20)
BUN/CREAT BLD: ABNORMAL (ref 9–20)
BUPRENORPHINE URINE: NORMAL
CALCIUM SERPL-MCNC: 9 MG/DL (ref 8.6–10.4)
CANNABINOID SCREEN URINE: NEGATIVE
CASTS UA: ABNORMAL /LPF (ref 0–8)
CHLORIDE BLD-SCNC: 99 MMOL/L (ref 98–107)
CO2: 20 MMOL/L (ref 20–31)
COCAINE METABOLITE, URINE: NEGATIVE
COLOR: YELLOW
COMMENT UA: ABNORMAL
CREAT SERPL-MCNC: 0.42 MG/DL (ref 0.5–0.9)
CREATININE URINE: 59.4 MG/DL (ref 28–217)
CRYSTALS, UA: ABNORMAL /HPF
DIFFERENTIAL TYPE: ABNORMAL
EOSINOPHILS RELATIVE PERCENT: 1 % (ref 1–4)
EPITHELIAL CELLS UA: ABNORMAL /HPF (ref 0–5)
EXPIRATION DATE: NORMAL
GFR AFRICAN AMERICAN: >60 ML/MIN
GFR NON-AFRICAN AMERICAN: >60 ML/MIN
GFR SERPL CREATININE-BSD FRML MDRD: ABNORMAL ML/MIN/{1.73_M2}
GFR SERPL CREATININE-BSD FRML MDRD: ABNORMAL ML/MIN/{1.73_M2}
GLUCOSE BLD-MCNC: 73 MG/DL (ref 70–99)
GLUCOSE BLD-MCNC: 79 MG/DL (ref 65–105)
GLUCOSE URINE: NEGATIVE
HCT VFR BLD CALC: 38.2 % (ref 36.3–47.1)
HEMOGLOBIN: 12.4 G/DL (ref 11.9–15.1)
IMMATURE GRANULOCYTES: 1 %
KETONES, URINE: ABNORMAL
LACTATE DEHYDROGENASE: 185 U/L (ref 135–214)
LEUKOCYTE ESTERASE, URINE: ABNORMAL
LYMPHOCYTES # BLD: 23 % (ref 24–43)
MCH RBC QN AUTO: 28.4 PG (ref 25.2–33.5)
MCHC RBC AUTO-ENTMCNC: 32.5 G/DL (ref 28.4–34.8)
MCV RBC AUTO: 87.6 FL (ref 82.6–102.9)
MDMA URINE: NORMAL
METHADONE SCREEN, URINE: NEGATIVE
METHAMPHETAMINE, URINE: NORMAL
MONOCYTES # BLD: 7 % (ref 3–12)
MUCUS: ABNORMAL
NITRITE, URINE: NEGATIVE
NRBC AUTOMATED: 0 PER 100 WBC
OPIATES, URINE: NEGATIVE
OTHER OBSERVATIONS UA: ABNORMAL
OXYCODONE SCREEN URINE: NEGATIVE
PDW BLD-RTO: 13.3 % (ref 11.8–14.4)
PH UA: 6.5 (ref 5–8)
PHENCYCLIDINE, URINE: NEGATIVE
PLATELET # BLD: 90 K/UL (ref 138–453)
PLATELET # BLD: 98 K/UL (ref 138–453)
PLATELET ESTIMATE: ABNORMAL
PMV BLD AUTO: 11.7 FL (ref 8.1–13.5)
POTASSIUM SERPL-SCNC: 3.7 MMOL/L (ref 3.7–5.3)
PROPOXYPHENE, URINE: NORMAL
PROTEIN UA: NEGATIVE
RBC # BLD: 4.36 M/UL (ref 3.95–5.11)
RBC # BLD: ABNORMAL 10*6/UL
RBC UA: ABNORMAL /HPF (ref 0–4)
RENAL EPITHELIAL, UA: ABNORMAL /HPF
SEG NEUTROPHILS: 68 % (ref 36–65)
SEGMENTED NEUTROPHILS ABSOLUTE COUNT: 7.83 K/UL (ref 1.5–8.1)
SODIUM BLD-SCNC: 135 MMOL/L (ref 135–144)
SPECIFIC GRAVITY UA: 1.02 (ref 1–1.03)
T. PALLIDUM, IGG: NONREACTIVE
TEST INFORMATION: NORMAL
TOTAL PROTEIN, URINE: 14 MG/DL
TOTAL PROTEIN: 6.5 G/DL (ref 6.4–8.3)
TRICHOMONAS: ABNORMAL
TRICYCLIC ANTIDEPRESSANTS, UR: NORMAL
TURBIDITY: CLEAR
URIC ACID: 5.2 MG/DL (ref 2.4–5.7)
URINE HGB: NEGATIVE
URINE TOTAL PROTEIN CREATININE RATIO: 0.24 (ref 0–0.2)
UROBILINOGEN, URINE: NORMAL
WBC # BLD: 11.5 K/UL (ref 3.5–11.3)
WBC # BLD: ABNORMAL 10*3/UL
WBC UA: ABNORMAL /HPF (ref 0–5)
YEAST: ABNORMAL

## 2018-03-14 PROCEDURE — 86780 TREPONEMA PALLIDUM: CPT

## 2018-03-14 PROCEDURE — 86901 BLOOD TYPING SEROLOGIC RH(D): CPT

## 2018-03-14 PROCEDURE — 80307 DRUG TEST PRSMV CHEM ANLYZR: CPT

## 2018-03-14 PROCEDURE — 36415 COLL VENOUS BLD VENIPUNCTURE: CPT

## 2018-03-14 PROCEDURE — 83615 LACTATE (LD) (LDH) ENZYME: CPT

## 2018-03-14 PROCEDURE — 99211 OFF/OP EST MAY X REQ PHY/QHP: CPT | Performed by: OBSTETRICS & GYNECOLOGY

## 2018-03-14 PROCEDURE — 85025 COMPLETE CBC W/AUTO DIFF WBC: CPT

## 2018-03-14 PROCEDURE — 76820 UMBILICAL ARTERY ECHO: CPT | Performed by: OBSTETRICS & GYNECOLOGY

## 2018-03-14 PROCEDURE — 82947 ASSAY GLUCOSE BLOOD QUANT: CPT

## 2018-03-14 PROCEDURE — 76816 OB US FOLLOW-UP PER FETUS: CPT | Performed by: OBSTETRICS & GYNECOLOGY

## 2018-03-14 PROCEDURE — 76819 FETAL BIOPHYS PROFIL W/O NST: CPT | Performed by: OBSTETRICS & GYNECOLOGY

## 2018-03-14 PROCEDURE — 80053 COMPREHEN METABOLIC PANEL: CPT

## 2018-03-14 PROCEDURE — 85049 AUTOMATED PLATELET COUNT: CPT

## 2018-03-14 PROCEDURE — 86850 RBC ANTIBODY SCREEN: CPT

## 2018-03-14 PROCEDURE — 6360000002 HC RX W HCPCS: Performed by: STUDENT IN AN ORGANIZED HEALTH CARE EDUCATION/TRAINING PROGRAM

## 2018-03-14 PROCEDURE — 82570 ASSAY OF URINE CREATININE: CPT

## 2018-03-14 PROCEDURE — 86900 BLOOD TYPING SEROLOGIC ABO: CPT

## 2018-03-14 PROCEDURE — 87086 URINE CULTURE/COLONY COUNT: CPT

## 2018-03-14 PROCEDURE — 84550 ASSAY OF BLOOD/URIC ACID: CPT

## 2018-03-14 PROCEDURE — 2580000003 HC RX 258: Performed by: STUDENT IN AN ORGANIZED HEALTH CARE EDUCATION/TRAINING PROGRAM

## 2018-03-14 PROCEDURE — 2500000003 HC RX 250 WO HCPCS

## 2018-03-14 PROCEDURE — 81001 URINALYSIS AUTO W/SCOPE: CPT

## 2018-03-14 PROCEDURE — 84156 ASSAY OF PROTEIN URINE: CPT

## 2018-03-14 RX ORDER — DIPHENHYDRAMINE HCL 25 MG
25 TABLET ORAL EVERY 4 HOURS PRN
Status: DISCONTINUED | OUTPATIENT
Start: 2018-03-14 | End: 2018-03-15

## 2018-03-14 RX ORDER — SODIUM CHLORIDE, SODIUM LACTATE, POTASSIUM CHLORIDE, CALCIUM CHLORIDE 600; 310; 30; 20 MG/100ML; MG/100ML; MG/100ML; MG/100ML
INJECTION, SOLUTION INTRAVENOUS CONTINUOUS
Status: DISCONTINUED | OUTPATIENT
Start: 2018-03-14 | End: 2018-03-14

## 2018-03-14 RX ORDER — MAGNESIUM SULFATE IN WATER 40 MG/ML
4 INJECTION, SOLUTION INTRAVENOUS ONCE
Status: COMPLETED | OUTPATIENT
Start: 2018-03-14 | End: 2018-03-14

## 2018-03-14 RX ORDER — ACETAMINOPHEN 500 MG
1000 TABLET ORAL EVERY 6 HOURS PRN
Status: DISCONTINUED | OUTPATIENT
Start: 2018-03-14 | End: 2018-03-15

## 2018-03-14 RX ORDER — SODIUM CHLORIDE 9 MG/ML
INJECTION, SOLUTION INTRAVENOUS CONTINUOUS
Status: DISCONTINUED | OUTPATIENT
Start: 2018-03-14 | End: 2018-03-16

## 2018-03-14 RX ORDER — ONDANSETRON 2 MG/ML
4 INJECTION INTRAMUSCULAR; INTRAVENOUS EVERY 6 HOURS PRN
Status: DISCONTINUED | OUTPATIENT
Start: 2018-03-14 | End: 2018-03-15

## 2018-03-14 RX ADMIN — Medication 1 MILLI-UNITS/MIN: at 22:13

## 2018-03-14 RX ADMIN — SODIUM CHLORIDE: 9 INJECTION, SOLUTION INTRAVENOUS at 19:45

## 2018-03-14 RX ADMIN — MAGNESIUM SULFATE IN WATER 4 G: 40 INJECTION, SOLUTION INTRAVENOUS at 21:50

## 2018-03-14 RX ADMIN — MAGNESIUM SULFATE HEPTAHYDRATE 1 G/HR: 40 INJECTION, SOLUTION INTRAVENOUS at 22:07

## 2018-03-14 NOTE — H&P
List   Diagnosis    Supervision of other normal pregnancy, antepartum    Low implantation of placenta without hemorrhage    Diet controlled gestational diabetes mellitus (GDM) in third trimester    Suspected placental problem not found    Elevated blood pressure complicating pregnancy, antepartum, third trimester    HRP (high risk pregnancy), third trimester        Steroids Given In This Pregnancy:  no     REVIEW OF SYSTEMS:   Constitutional: negative fever, negative chills  HEENT: negative visual disturbances, negative headaches  Respiratory: negative dyspnea, negative cough  Cardiovascular: negative chest pain,  negative palpitations  Gastrointestinal: negative abdominal pain, negative RUQ pain, negative N/V, negative diarrhea, negative constipation  Genitourinary: negative dysuria, negative vaginal discharge  Dermatological: negative rash  Hematologic: negative bruising  Immunologic/Lymphatic: negative recent illness, negative recent sick contact  Musculoskeletal: negative back pain, negative myalgias, negative arthralgias  Neurological:  negative dizziness, negative weakness  Behavior/Psych: negative depression, negative anxiety      OBSTETRICAL HISTORY:   Obstetric History       T1      L1     SAB0   TAB0   Ectopic0   Molar0   Multiple0   Live Births1       # Outcome Date GA Lbr Tulio/2nd Weight Sex Delivery Anes PTL Lv   2 Current            1 Term 03/10/06   7 lb 1 oz (3.204 kg) M Vag-Spont   REED          PAST MEDICAL HISTORY:   has a past medical history of Abnormal Pap smear; Adopted; Asthma; and Tachycardia. PAST SURGICAL HISTORY:   has a past surgical history that includes LEEP (2007); Tubal ligation; Dental surgery; and Wrist surgery. ALLERGIES:  is allergic to bactrim [sulfamethoxazole-trimethoprim]. MEDICATIONS:  Prior to Admission medications    Medication Sig Start Date End Date Taking?  Authorizing Provider   Prenatal Vit w/Mm-Natxprmat-UF (PNV PO) Take by mouth Hepatitis B Surface Antigen: non-reactive   HIV: non-reactive   Sickle Cell Screen: not done  Gonorrhea: negative  Chlamydia: negative  Urine culture: negative, date: 17    1 hour Glucose Tolerance Test: 184  3 hour Glucose Tolerance Test: Fastin; 1 hour: 212; 2 hour  157; 3 hour: 108    Group B Strep: negative  Cystic Fibrosis Screen: not available  First Trimester Screen: not available  MSAFP/Multiple Markers: not available  Non-Invasive Prenatal Testing: not available  Anatomy US: normal, posterior, 3vc, female    ASSESSMENT & PLAN:  Beba Fairbanks is a 33 y.o. female  at 37w3d IOL 2/2 preeclampsia w/ SF (Platelets 90)   - GBS negative / Rh positive / R immune   - No indication for GBS prophylaxis   - Admit to L&D    - CEFM/TOCO   - CBC, T&S, T. Pall ordered   - PreE labs wnl except low platelets   - P/C ratio pending   - UA w/ reflex   - Urine drug screen ordered   -  Informed consent obtained. Discussed R/B/A. All questions and concerns answered. Patient verbalized understanding and agreement to plan.     - IV fluid - NS @75cc/hr   - Strict I's/O's   - Denies s/s of preeclampsia including HA, VC, RUQ pain or worsening swelling   - Magnesium bolus and continuous infusion   - Induction method: Pitocin    Elevated BPs   - Elevated BP of 140/90s in MFM office   - No BPs have been severe range     GDMA1   - Diagnosed by elevated 1 hr, failed 3hr   - Patient was seen by M today who started her on  NPH Insulin subcutaneously/SQ 8 units before bedtime  (consistently elevated fasting blood sugars above 90's) and 4 units subcutaneous (SQ) Novolog before dinner (consistently elevated postprandial dinner values)   - Blood sugar checks every 4hrs, will change to hourly when in active labor    - Glucose on CMP on admission was 73    Patient Active Problem List    Diagnosis Date Noted    Elevated blood pressure complicating pregnancy, antepartum, third trimester 2018    HRP (high risk pregnancy),

## 2018-03-15 ENCOUNTER — APPOINTMENT (OUTPATIENT)
Dept: GENERAL RADIOLOGY | Age: 33
End: 2018-03-15
Payer: COMMERCIAL

## 2018-03-15 PROBLEM — O09.90 HIGH RISK PREGNANCY, ANTEPARTUM: Status: ACTIVE | Noted: 2018-03-15

## 2018-03-15 LAB
ABSOLUTE EOS #: <0.03 K/UL (ref 0–0.44)
ABSOLUTE IMMATURE GRANULOCYTE: 0.12 K/UL (ref 0–0.3)
ABSOLUTE LYMPH #: 2.42 K/UL (ref 1.1–3.7)
ABSOLUTE MONO #: 1.28 K/UL (ref 0.1–1.2)
BASOPHILS # BLD: 0 % (ref 0–2)
BASOPHILS ABSOLUTE: 0.03 K/UL (ref 0–0.2)
CULTURE: NORMAL
CULTURE: NORMAL
DIFFERENTIAL TYPE: ABNORMAL
EKG ATRIAL RATE: 99 BPM
EKG P AXIS: 36 DEGREES
EKG P-R INTERVAL: 148 MS
EKG Q-T INTERVAL: 352 MS
EKG QRS DURATION: 82 MS
EKG QTC CALCULATION (BAZETT): 451 MS
EKG R AXIS: 61 DEGREES
EKG T AXIS: 33 DEGREES
EKG VENTRICULAR RATE: 99 BPM
EOSINOPHILS RELATIVE PERCENT: 0 % (ref 1–4)
HCT VFR BLD CALC: 32.5 % (ref 36.3–47.1)
HEMOGLOBIN: 10.5 G/DL (ref 11.9–15.1)
IMMATURE GRANULOCYTES: 1 %
LYMPHOCYTES # BLD: 13 % (ref 24–43)
Lab: NORMAL
MAGNESIUM: 6.1 MG/DL (ref 1.6–2.6)
MCH RBC QN AUTO: 28.6 PG (ref 25.2–33.5)
MCHC RBC AUTO-ENTMCNC: 32.3 G/DL (ref 28.4–34.8)
MCV RBC AUTO: 88.6 FL (ref 82.6–102.9)
MONOCYTES # BLD: 7 % (ref 3–12)
NRBC AUTOMATED: 0 PER 100 WBC
PDW BLD-RTO: 13.5 % (ref 11.8–14.4)
PLATELET # BLD: 88 K/UL (ref 138–453)
PLATELET ESTIMATE: ABNORMAL
PMV BLD AUTO: 12.1 FL (ref 8.1–13.5)
RBC # BLD: 3.67 M/UL (ref 3.95–5.11)
RBC # BLD: ABNORMAL 10*6/UL
SEG NEUTROPHILS: 79 % (ref 36–65)
SEGMENTED NEUTROPHILS ABSOLUTE COUNT: 14.61 K/UL (ref 1.5–8.1)
SPECIMEN DESCRIPTION: NORMAL
STATUS: NORMAL
WBC # BLD: 18.5 K/UL (ref 3.5–11.3)
WBC # BLD: ABNORMAL 10*3/UL

## 2018-03-15 PROCEDURE — 6360000002 HC RX W HCPCS

## 2018-03-15 PROCEDURE — 85025 COMPLETE CBC W/AUTO DIFF WBC: CPT

## 2018-03-15 PROCEDURE — 83735 ASSAY OF MAGNESIUM: CPT

## 2018-03-15 PROCEDURE — 59050 FETAL MONITOR W/REPORT: CPT

## 2018-03-15 PROCEDURE — 10907ZC DRAINAGE OF AMNIOTIC FLUID, THERAPEUTIC FROM PRODUCTS OF CONCEPTION, VIA NATURAL OR ARTIFICIAL OPENING: ICD-10-PCS | Performed by: OBSTETRICS & GYNECOLOGY

## 2018-03-15 PROCEDURE — 88307 TISSUE EXAM BY PATHOLOGIST: CPT

## 2018-03-15 PROCEDURE — 96365 THER/PROPH/DIAG IV INF INIT: CPT

## 2018-03-15 PROCEDURE — 7200000001 HC VAGINAL DELIVERY

## 2018-03-15 PROCEDURE — 6360000002 HC RX W HCPCS: Performed by: STUDENT IN AN ORGANIZED HEALTH CARE EDUCATION/TRAINING PROGRAM

## 2018-03-15 PROCEDURE — 71046 X-RAY EXAM CHEST 2 VIEWS: CPT

## 2018-03-15 PROCEDURE — 1220000000 HC SEMI PRIVATE OB R&B

## 2018-03-15 PROCEDURE — 6370000000 HC RX 637 (ALT 250 FOR IP): Performed by: STUDENT IN AN ORGANIZED HEALTH CARE EDUCATION/TRAINING PROGRAM

## 2018-03-15 PROCEDURE — 3E033VJ INTRODUCTION OF OTHER HORMONE INTO PERIPHERAL VEIN, PERCUTANEOUS APPROACH: ICD-10-PCS | Performed by: OBSTETRICS & GYNECOLOGY

## 2018-03-15 PROCEDURE — 59400 OBSTETRICAL CARE: CPT | Performed by: OBSTETRICS & GYNECOLOGY

## 2018-03-15 PROCEDURE — 96366 THER/PROPH/DIAG IV INF ADDON: CPT

## 2018-03-15 PROCEDURE — 2500000003 HC RX 250 WO HCPCS: Performed by: STUDENT IN AN ORGANIZED HEALTH CARE EDUCATION/TRAINING PROGRAM

## 2018-03-15 PROCEDURE — 10H07YZ INSERTION OF OTHER DEVICE INTO PRODUCTS OF CONCEPTION, VIA NATURAL OR ARTIFICIAL OPENING: ICD-10-PCS | Performed by: OBSTETRICS & GYNECOLOGY

## 2018-03-15 PROCEDURE — 93005 ELECTROCARDIOGRAM TRACING: CPT

## 2018-03-15 PROCEDURE — 96368 THER/DIAG CONCURRENT INF: CPT

## 2018-03-15 PROCEDURE — 36415 COLL VENOUS BLD VENIPUNCTURE: CPT

## 2018-03-15 RX ORDER — CARBOPROST TROMETHAMINE 250 UG/ML
250 INJECTION, SOLUTION INTRAMUSCULAR ONCE
Status: COMPLETED | OUTPATIENT
Start: 2018-03-15 | End: 2018-03-15

## 2018-03-15 RX ORDER — ONDANSETRON 4 MG/1
8 TABLET, FILM COATED ORAL EVERY 8 HOURS PRN
Status: DISCONTINUED | OUTPATIENT
Start: 2018-03-15 | End: 2018-03-17 | Stop reason: HOSPADM

## 2018-03-15 RX ORDER — ACETAMINOPHEN 500 MG
1000 TABLET ORAL EVERY 6 HOURS PRN
Status: DISCONTINUED | OUTPATIENT
Start: 2018-03-15 | End: 2018-03-17 | Stop reason: HOSPADM

## 2018-03-15 RX ORDER — MORPHINE SULFATE 2 MG/ML
4 INJECTION, SOLUTION INTRAMUSCULAR; INTRAVENOUS ONCE
Status: COMPLETED | OUTPATIENT
Start: 2018-03-15 | End: 2018-03-15

## 2018-03-15 RX ORDER — MORPHINE SULFATE 10 MG/ML
INJECTION, SOLUTION INTRAMUSCULAR; INTRAVENOUS
Status: DISCONTINUED
Start: 2018-03-15 | End: 2018-03-15

## 2018-03-15 RX ORDER — MISOPROSTOL 100 UG/1
600 TABLET ORAL ONCE
Status: COMPLETED | OUTPATIENT
Start: 2018-03-15 | End: 2018-03-15

## 2018-03-15 RX ORDER — DOCUSATE SODIUM 100 MG/1
100 CAPSULE, LIQUID FILLED ORAL 2 TIMES DAILY
Status: DISCONTINUED | OUTPATIENT
Start: 2018-03-15 | End: 2018-03-17 | Stop reason: HOSPADM

## 2018-03-15 RX ORDER — LOPERAMIDE HYDROCHLORIDE 2 MG/1
2 CAPSULE ORAL 3 TIMES DAILY
Status: DISCONTINUED | OUTPATIENT
Start: 2018-03-15 | End: 2018-03-17 | Stop reason: HOSPADM

## 2018-03-15 RX ORDER — LANOLIN 100 %
OINTMENT (GRAM) TOPICAL PRN
Status: DISCONTINUED | OUTPATIENT
Start: 2018-03-15 | End: 2018-03-17 | Stop reason: HOSPADM

## 2018-03-15 RX ORDER — IBUPROFEN 800 MG/1
800 TABLET ORAL EVERY 8 HOURS PRN
Status: DISCONTINUED | OUTPATIENT
Start: 2018-03-15 | End: 2018-03-17 | Stop reason: HOSPADM

## 2018-03-15 RX ORDER — SIMETHICONE 80 MG
80 TABLET,CHEWABLE ORAL EVERY 6 HOURS PRN
Status: DISCONTINUED | OUTPATIENT
Start: 2018-03-15 | End: 2018-03-17 | Stop reason: HOSPADM

## 2018-03-15 RX ADMIN — CARBOPROST TROMETHAMINE 250 MCG: 250 INJECTION, SOLUTION INTRAMUSCULAR at 04:35

## 2018-03-15 RX ADMIN — MAGNESIUM SULFATE HEPTAHYDRATE 2 G/HR: 40 INJECTION, SOLUTION INTRAVENOUS at 12:05

## 2018-03-15 RX ADMIN — IBUPROFEN 800 MG: 800 TABLET, FILM COATED ORAL at 05:07

## 2018-03-15 RX ADMIN — ACETAMINOPHEN 1000 MG: 500 TABLET ORAL at 20:17

## 2018-03-15 RX ADMIN — MAGNESIUM SULFATE HEPTAHYDRATE 2 G/HR: 40 INJECTION, SOLUTION INTRAVENOUS at 21:27

## 2018-03-15 RX ADMIN — MISOPROSTOL 600 MCG: 100 TABLET ORAL at 06:11

## 2018-03-15 RX ADMIN — MORPHINE SULFATE 4 MG: 2 INJECTION, SOLUTION INTRAMUSCULAR; INTRAVENOUS at 04:33

## 2018-03-15 RX ADMIN — DOCUSATE SODIUM 100 MG: 100 CAPSULE ORAL at 09:56

## 2018-03-15 ASSESSMENT — PAIN SCALES - GENERAL
PAINLEVEL_OUTOF10: 0
PAINLEVEL_OUTOF10: 10
PAINLEVEL_OUTOF10: 4
PAINLEVEL_OUTOF10: 10
PAINLEVEL_OUTOF10: 6
PAINLEVEL_OUTOF10: 0

## 2018-03-15 NOTE — PROGRESS NOTES
Obstetric/Gynecology Resident Interval Note    Patient seen with Dr. Ricky Ordoñez. Patient requesting nitrous oxide. Consent reviewed. Orders placed.    SVE per Dr. Ricky Ordoñez 7cm, 100% effaced  Category 1 tracing  Continue current management    Duke Rivera DO  OB/GYN Resident, PGY2  [facility]  3/15/2018, 3:34 AM

## 2018-03-15 NOTE — PROGRESS NOTES
Labor Progress Note  Resident Interval Magnesium Note    Suzanne Habermann is a 33 y.o. female  at 37w3d  The patient was seen and examined. The patient is resting comfortably. Her pain is well controlled. She reports fetal movement is present, denies contractions, denies loss of fluid, complains of vaginal bleeding (bloody show). She denies headache, visual changes, abdominal pain in the right upper quadrant and nausea. She denies any shortness of breath or chest pain. She denies change in her extremities, regarding swelling.     Continuous Medications:    oxytocin      magnesium sulfate 1 g/hr (18)    sodium chloride 75 mL/hr at 18 1945    oxytocin 4 jason-units/min (18 2315)         Vital Signs:  Vitals:    18 2215 18 2300 18 2301 03/15/18 0000   BP: 137/80  135/87 133/87   Pulse: 88  96 96   Resp:  18   Temp:    98.5 °F (36.9 °C)   TempSrc:    Oral   SpO2: 97% 97%  96%   Weight:       Height:               Physical Exam:  FHT: 145, moderate variability, accelerations present, decelerations absent  Contractions: regular, every 5 minutes  Cervical Exam: 5-6 cm dilated, 80 effaced, -1 station, cervical position posterior to midposition    Chest: clear to auscultation bilaterally  Heart: RRR no murmur  Abdomen: soft, nontender, gravid, no s/s chorio or abruption  Extremities: DTR normal Right: 2+/4   Left: 2+/4  Clonus: absent    Urine Output: adequate/hr; Clear urine      Pitocin: @ 8 mu/min    Membranes: AROM, Ruptured clear fluid @0045  Scalp Electrode in place: absent  Intrauterine Pressure Catheter in Place: present    Interventions: AROM, clear fluid with     Labs:  Last Magnesium Level:   No results found for: MG    BMP:    Recent Labs      18   1815   NA  135   K  3.7   CL  99   CO2  20   BUN  11   CREATININE  0.42*   GLUCOSE  73       Assessment/Plan:  Suzanne Habermann is a 33 y.o. female  at 37w3d IOL 2/2 Preeclampsia with Severe Features (new onset hypertension with thrombocytopenia)   - GBS negative, No indication for GBS prophylaxis   - CEFM/TOCO   - S/P Mag bolus with mag infusion running   - Continue Magnesium Sulfate Treatment   - AROM/IUPC in place   - Will discuss epidural placement with anesthesiology, last platelet count 16,741   - Continue pitocin per protocol    Dr. Joe Dougherty updated and in agreement with plan    Margo Quigley DO  Ob/Gyn Resident  3/14/2018, 11:53 PM

## 2018-03-15 NOTE — PROGRESS NOTES
Resident Interval Magnesium Sulfate Note    Julia Damon is a 33 y.o. female [id number] PPD# 0 s/p  3/15/18  Patient seen and examined. The patient is resting comfortably. She is in the process of getting her EKG done. She is doing well. Reports her chest tightness is not related to dyspnea. She describes it as a \"burning\" sensation \"like when you have bronchitis\". She denies headache, visual changes, abdominal pain in the right upper quadrant and nausea and vomiting. She denies any shortness of breath. She denies change in her extremities, regarding swelling.     Continuous Medications:    oxytocin 1 jason-units/min (03/15/18 0421)    magnesium sulfate 2 g/hr (03/15/18 2127)    sodium chloride 75 mL/hr at 18       Vitals:    Vitals:    03/15/18 1400 03/15/18 1600 03/15/18 1800 03/15/18 2000   BP: 121/79 126/89 (!) 118/91 (!) 140/93   Pulse: 92 108 100 100   Resp: 16 16 16 16   Temp: 97.9 °F (36.6 °C) 98.3 °F (36.8 °C) 98.1 °F (36.7 °C) 98.2 °F (36.8 °C)   TempSrc: Oral Oral Oral Oral   SpO2: 95% 97% 98% 99%   Weight:       Height:             Physical Exam:  Chest: clear to auscultation bilaterally  Heart: RRR no murmur  Abdomen: soft, nontender, nondistended  Extremities: DTR increased Right: 3/4   Left: 3/4  Clonus: absent    Urine Output: adequate/hr; Clear urine    Labs:  Last Magnesium Level:   Lab Results   Component Value Date    MG 6.1 03/15/2018       BMP:    Recent Labs      18   1815   NA  135   K  3.7   CL  99   CO2  20   BUN  11   CREATININE  0.42*   GLUCOSE  73           Recent Results (from the past 12 hour(s))   CBC auto differential    Collection Time: 03/15/18  4:43 PM   Result Value Ref Range    WBC 18.5 (H) 3.5 - 11.3 k/uL    RBC 3.67 (L) 3.95 - 5.11 m/uL    Hemoglobin 10.5 (L) 11.9 - 15.1 g/dL    Hematocrit 32.5 (L) 36.3 - 47.1 %    MCV 88.6 82.6 - 102.9 fL    MCH 28.6 25.2 - 33.5 pg    MCHC 32.3 28.4 - 34.8 g/dL    RDW 13.5 11.8 - 14.4 %    Platelets 88 (L) 603 - 453 k/uL

## 2018-03-15 NOTE — PROGRESS NOTES
Dr. Andra Fan called and updated on patients BP and hyperactive reflexes during MAG protocol check. No MAGNESIUM levels have been ordered- Dr. Andra Fan clarified with attending and she does not want any levels ordered for this patient. Also, Motrin has been given this AM-clarified that this was okay to continue being that patient has elevated BP's and low platelet levels. Dr. Andra Fan states that still okay to continue with Motrin.

## 2018-03-16 LAB
ABSOLUTE EOS #: 0.11 K/UL (ref 0–0.44)
ABSOLUTE IMMATURE GRANULOCYTE: 0.13 K/UL (ref 0–0.3)
ABSOLUTE LYMPH #: 3 K/UL (ref 1.1–3.7)
ABSOLUTE MONO #: 0.83 K/UL (ref 0.1–1.2)
ALBUMIN SERPL-MCNC: 3.1 G/DL (ref 3.5–5.2)
ALBUMIN/GLOBULIN RATIO: 1.1 (ref 1–2.5)
ALP BLD-CCNC: 115 U/L (ref 35–104)
ALT SERPL-CCNC: 15 U/L (ref 5–33)
ANION GAP SERPL CALCULATED.3IONS-SCNC: 12 MMOL/L (ref 9–17)
AST SERPL-CCNC: 28 U/L
BASOPHILS # BLD: 0 % (ref 0–2)
BASOPHILS ABSOLUTE: 0.03 K/UL (ref 0–0.2)
BILIRUB SERPL-MCNC: 0.78 MG/DL (ref 0.3–1.2)
BUN BLDV-MCNC: 5 MG/DL (ref 6–20)
BUN/CREAT BLD: ABNORMAL (ref 9–20)
CALCIUM SERPL-MCNC: 7.3 MG/DL (ref 8.6–10.4)
CHLORIDE BLD-SCNC: 102 MMOL/L (ref 98–107)
CO2: 25 MMOL/L (ref 20–31)
CREAT SERPL-MCNC: 0.43 MG/DL (ref 0.5–0.9)
DIFFERENTIAL TYPE: ABNORMAL
EOSINOPHILS RELATIVE PERCENT: 1 % (ref 1–4)
GFR AFRICAN AMERICAN: >60 ML/MIN
GFR NON-AFRICAN AMERICAN: >60 ML/MIN
GFR SERPL CREATININE-BSD FRML MDRD: ABNORMAL ML/MIN/{1.73_M2}
GFR SERPL CREATININE-BSD FRML MDRD: ABNORMAL ML/MIN/{1.73_M2}
GLUCOSE BLD-MCNC: 111 MG/DL (ref 70–99)
HCT VFR BLD CALC: 31.1 % (ref 36.3–47.1)
HEMOGLOBIN: 10 G/DL (ref 11.9–15.1)
IMMATURE GRANULOCYTES: 1 %
LYMPHOCYTES # BLD: 20 % (ref 24–43)
MCH RBC QN AUTO: 28.2 PG (ref 25.2–33.5)
MCHC RBC AUTO-ENTMCNC: 32.2 G/DL (ref 28.4–34.8)
MCV RBC AUTO: 87.6 FL (ref 82.6–102.9)
MONOCYTES # BLD: 6 % (ref 3–12)
NRBC AUTOMATED: 0 PER 100 WBC
PDW BLD-RTO: 13.8 % (ref 11.8–14.4)
PLATELET # BLD: 89 K/UL (ref 138–453)
PLATELET ESTIMATE: ABNORMAL
PMV BLD AUTO: 11.3 FL (ref 8.1–13.5)
POTASSIUM SERPL-SCNC: 3.5 MMOL/L (ref 3.7–5.3)
RBC # BLD: 3.55 M/UL (ref 3.95–5.11)
RBC # BLD: ABNORMAL 10*6/UL
SEG NEUTROPHILS: 72 % (ref 36–65)
SEGMENTED NEUTROPHILS ABSOLUTE COUNT: 10.61 K/UL (ref 1.5–8.1)
SODIUM BLD-SCNC: 139 MMOL/L (ref 135–144)
TOTAL PROTEIN: 5.8 G/DL (ref 6.4–8.3)
WBC # BLD: 14.7 K/UL (ref 3.5–11.3)
WBC # BLD: ABNORMAL 10*3/UL

## 2018-03-16 PROCEDURE — 1220000000 HC SEMI PRIVATE OB R&B

## 2018-03-16 PROCEDURE — 6370000000 HC RX 637 (ALT 250 FOR IP): Performed by: STUDENT IN AN ORGANIZED HEALTH CARE EDUCATION/TRAINING PROGRAM

## 2018-03-16 PROCEDURE — 80053 COMPREHEN METABOLIC PANEL: CPT

## 2018-03-16 PROCEDURE — 36415 COLL VENOUS BLD VENIPUNCTURE: CPT

## 2018-03-16 PROCEDURE — 85025 COMPLETE CBC W/AUTO DIFF WBC: CPT

## 2018-03-16 RX ADMIN — ACETAMINOPHEN 1000 MG: 500 TABLET ORAL at 16:54

## 2018-03-16 ASSESSMENT — PAIN SCALES - GENERAL: PAINLEVEL_OUTOF10: 4

## 2018-03-16 NOTE — PROGRESS NOTES
a [id number] PPD # 1 s/p    - Doing well, VSS   - Female infant in [address]   - Encourage ambulation   - Postpartum Hgb/Hct awaiting  2. Rh positive/Rubella immune  3. Bottle feeding   4. Preeclampsia with severe features   - New onset hypertension and thrombocytopenia   - Occasional elevated BPs in the 140s/90s   - No severe range BPs   - Denies s/s of preeclampsia including HA, VC, RUQ pain or worsening swelling   - Plt count 90> 98 >88   - S/P Magnesium sulfate treatment x24hrs   - CBC, CMP pending this morning  5. Uterine atony postdelivery   - EBL 600mL   6. Intermittent tachycardia    - BPs stable, patient denies palpitations   - EKG wnl on 3/15 revealed normal sinus rhythm at time it was completed  7. Desires discharge today if possible  8. Continue post partum care    Counseling Completed:  Secondary Smoke risks and Sudden Infant Death Syndrome were reviewed with recommendations. Infant sleeping, \"back to sleep\" and avoidance of co-sleeping recommendations were reviewed. Signs and Symptoms of Post Partum Depression were reviewed. The patient is to call if any occur. Signs and symptoms of Mastitis were reviewed. The patient is to call if any occur for follow up.   Discharge instructions including pelvic rest, no driving with pain medicine and office follow-up were reviewed with patient     Provider's Name: Dr. Elbridge Skiff, DO  Ob/Gyn Resident   3/16/2018, 1:43 AM

## 2018-03-17 VITALS
DIASTOLIC BLOOD PRESSURE: 93 MMHG | HEIGHT: 62 IN | HEART RATE: 104 BPM | OXYGEN SATURATION: 100 % | RESPIRATION RATE: 17 BRPM | BODY MASS INDEX: 27.42 KG/M2 | TEMPERATURE: 99.1 F | WEIGHT: 149 LBS | SYSTOLIC BLOOD PRESSURE: 130 MMHG

## 2018-03-17 LAB
ABSOLUTE EOS #: 0.14 K/UL (ref 0–0.44)
ABSOLUTE IMMATURE GRANULOCYTE: 0.14 K/UL (ref 0–0.3)
ABSOLUTE LYMPH #: 3.27 K/UL (ref 1.1–3.7)
ABSOLUTE MONO #: 0.78 K/UL (ref 0.1–1.2)
BASOPHILS # BLD: 0 % (ref 0–2)
BASOPHILS ABSOLUTE: 0.04 K/UL (ref 0–0.2)
DIFFERENTIAL TYPE: ABNORMAL
EOSINOPHILS RELATIVE PERCENT: 1 % (ref 1–4)
HCT VFR BLD CALC: 31.1 % (ref 36.3–47.1)
HEMOGLOBIN: 9.8 G/DL (ref 11.9–15.1)
IMMATURE GRANULOCYTES: 1 %
LYMPHOCYTES # BLD: 23 % (ref 24–43)
MCH RBC QN AUTO: 28.5 PG (ref 25.2–33.5)
MCHC RBC AUTO-ENTMCNC: 31.5 G/DL (ref 28.4–34.8)
MCV RBC AUTO: 90.4 FL (ref 82.6–102.9)
MONOCYTES # BLD: 6 % (ref 3–12)
NRBC AUTOMATED: 0 PER 100 WBC
PDW BLD-RTO: 13.8 % (ref 11.8–14.4)
PLATELET # BLD: 118 K/UL (ref 138–453)
PLATELET ESTIMATE: ABNORMAL
PMV BLD AUTO: 11.3 FL (ref 8.1–13.5)
RBC # BLD: 3.44 M/UL (ref 3.95–5.11)
RBC # BLD: ABNORMAL 10*6/UL
SEG NEUTROPHILS: 69 % (ref 36–65)
SEGMENTED NEUTROPHILS ABSOLUTE COUNT: 9.59 K/UL (ref 1.5–8.1)
WBC # BLD: 14 K/UL (ref 3.5–11.3)
WBC # BLD: ABNORMAL 10*3/UL

## 2018-03-17 PROCEDURE — 90471 IMMUNIZATION ADMIN: CPT | Performed by: STUDENT IN AN ORGANIZED HEALTH CARE EDUCATION/TRAINING PROGRAM

## 2018-03-17 PROCEDURE — 85025 COMPLETE CBC W/AUTO DIFF WBC: CPT

## 2018-03-17 PROCEDURE — 36415 COLL VENOUS BLD VENIPUNCTURE: CPT

## 2018-03-17 PROCEDURE — 90715 TDAP VACCINE 7 YRS/> IM: CPT | Performed by: STUDENT IN AN ORGANIZED HEALTH CARE EDUCATION/TRAINING PROGRAM

## 2018-03-17 PROCEDURE — 6360000002 HC RX W HCPCS: Performed by: STUDENT IN AN ORGANIZED HEALTH CARE EDUCATION/TRAINING PROGRAM

## 2018-03-17 RX ORDER — IBUPROFEN 800 MG/1
800 TABLET ORAL EVERY 8 HOURS PRN
Qty: 40 TABLET | Refills: 0 | Status: SHIPPED | OUTPATIENT
Start: 2018-03-17 | End: 2021-09-14

## 2018-03-17 RX ORDER — ACETAMINOPHEN 500 MG
1000 TABLET ORAL EVERY 6 HOURS PRN
Qty: 40 TABLET | Refills: 3 | Status: SHIPPED | OUTPATIENT
Start: 2018-03-17 | End: 2021-09-14

## 2018-03-17 RX ADMIN — TETANUS TOXOID, REDUCED DIPHTHERIA TOXOID AND ACELLULAR PERTUSSIS VACCINE, ADSORBED 0.5 ML: 5; 2.5; 8; 8; 2.5 SUSPENSION INTRAMUSCULAR at 11:37

## 2018-03-17 NOTE — PROGRESS NOTES
POST PARTUM DAY # 2    Nanda Hedrick is a 33 y.o. female  This patient was seen & examined today.  3/16/18    Her pregnancy was complicated by:   Patient Active Problem List   Diagnosis    Supervision of other normal pregnancy, antepartum    Low implantation of placenta without hemorrhage    Diet controlled gestational diabetes mellitus (GDM) in third trimester    Suspected placental problem not found    Elevated blood pressure complicating pregnancy, antepartum, third trimester    Rh+/RI/GBS neg    High risk pregnancy, antepartum       Today she is doing well without any chief complaint. Her lochia is light. She denies chest pain, shortness of breath, headache, lightheadedness and blurred vision. She is bottlefeeding and she denies any breast tenderness. She is ambulating well. Her voiding pattern is normal. I reviewed signs and symptoms of post partum depression with the patient, she currently denies any of these symptoms. She is tolerating solids.      Vital Signs:  Vitals:    18 0800 18 1236 18 1659 18   BP: 124/82 129/86 125/83 118/84   Pulse: 97 100 90 87   Resp:    Temp: 98.2 °F (36.8 °C) 98.3 °F (36.8 °C) 99 °F (37.2 °C) 98.6 °F (37 °C)   TempSrc: Oral Oral Oral Oral   SpO2: 97% 98% 100%    Weight:       Height:             Physical Exam:  General:  no apparent distress, alert and cooperative  Neurologic:  alert, oriented, normal speech, no focal findings or movement disorder noted  Lungs:  No increased work of breathing, good air exchange, clear to auscultation bilaterally, no crackles or wheezing  Heart:  Normal apical impulse, regular rate and rhythm, normal S1 and S2, no S3 or S4, and no murmur noted    Abdomen: abdomen soft, non-distended, non-tender  Fundus: non-tender, firm, below umbilicus  Extremities:  no calf tenderness, non edematous    Lab:  Lab Results   Component Value Date    HGB 10.0 (L) 2018     Lab Results   Component Value Date    HCT 31.1 (L) 2018       Assessment/Plan:  1. Julia Damon is a [id number] PPD # 2 s/p    - Doing well, VSS   - Female infant in [address]   - Encourage ambulation  2. Rh positive/Rubella immune  3. Bottle feeding   4. Preeclampsia with severe features   - New onset hypertension and thrombocytopenia   - BPs 110s-120s/80s   - No severe range BPs   - Denies s/s of preeclampsia including HA, VC, RUQ pain or worsening swelling   - Plt count 90> 98 >88 > 89 (R/p CBC pending this morning)   - S/P Magnesium sulfate treatment x24hrs   - CBC pending this AM  5. Uterine atony postdelivery   - EBL 600mL   6. Intermittent tachycardia    - BPs stable, patient denies palpitations   - EKG wnl on 3/15 revealed normal sinus rhythm at time it was completed  7. Desires discharge today if possible, ok per Dr. Celeste Brown depending if baby ok for discharge due to bilirubin light treatment  8. Continue post partum care    Counseling Completed:  Secondary Smoke risks and Sudden Infant Death Syndrome were reviewed with recommendations. Infant sleeping, \"back to sleep\" and avoidance of co-sleeping recommendations were reviewed. Signs and Symptoms of Post Partum Depression were reviewed. The patient is to call if any occur. Signs and symptoms of Mastitis were reviewed. The patient is to call if any occur for follow up.   Discharge instructions including pelvic rest, no driving with pain medicine and office follow-up were reviewed with patient     Provider's Name: Dr. Aysha Willis DO  Ob/Gyn Resident   3/17/2018, 5:40 AM

## 2018-03-17 NOTE — PLAN OF CARE
Problem: VAGINAL DELIVERY - RECOVERY AND POST PARTUM  Goal: Vital signs are medically acceptable  Outcome: Completed Date Met: 03/17/18    Goal: Patient will remain free of falls  Outcome: Completed Date Met: 03/17/18    Goal: Fundus firm at midline  Outcome: Completed Date Met: 03/17/18    Goal: Moderate rubra without clots, no purulent discharge, no foul smelling lochia  Outcome: Completed Date Met: 03/17/18    Goal: Empties bladder  Outcome: Completed Date Met: 03/17/18    Goal: Verbalizes understanding of normal bowel function resumption  Outcome: Completed Date Met: 03/17/18    Goal: Edema will be absent or minimal  Outcome: Completed Date Met: 03/17/18    Goal: Breasts are soft with nipple integrity intact  Outcome: Completed Date Met: 03/17/18    Goal: Demonstrates appropriate breast feeding techniques  Outcome: Completed Date Met: 03/17/18    Goal: Appropriate behavior observed  Outcome: Completed Date Met: 03/17/18    Goal: Positive Mother-Baby interactions are observed  Outcome: Completed Date Met: 03/17/18    Goal: Perineum intact without discharge or hematoma  Outcome: Completed Date Met: 03/17/18    Goal: Ambulates independently  Outcome: Completed Date Met: 03/17/18      Problem: PAIN  Goal: Patient's pain/discomfort is manageable  Outcome: Completed Date Met: 03/17/18

## 2018-03-19 PROBLEM — O14.90 PREECLAMPSIA: Status: ACTIVE | Noted: 2018-03-19

## 2018-03-19 LAB — SURGICAL PATHOLOGY REPORT: NORMAL

## 2018-03-20 VITALS — WEIGHT: 122 LBS

## 2018-04-03 ENCOUNTER — POSTPARTUM VISIT (OUTPATIENT)
Dept: OBGYN CLINIC | Age: 33
End: 2018-04-03

## 2018-04-03 VITALS
HEIGHT: 62 IN | DIASTOLIC BLOOD PRESSURE: 77 MMHG | WEIGHT: 126 LBS | BODY MASS INDEX: 23.19 KG/M2 | SYSTOLIC BLOOD PRESSURE: 109 MMHG | HEART RATE: 86 BPM

## 2018-04-03 PROCEDURE — 99024 POSTOP FOLLOW-UP VISIT: CPT | Performed by: OBSTETRICS & GYNECOLOGY

## 2018-05-01 ENCOUNTER — POSTPARTUM VISIT (OUTPATIENT)
Dept: OBGYN CLINIC | Age: 33
End: 2018-05-01

## 2018-05-01 VITALS
HEIGHT: 62 IN | DIASTOLIC BLOOD PRESSURE: 80 MMHG | SYSTOLIC BLOOD PRESSURE: 120 MMHG | BODY MASS INDEX: 22.63 KG/M2 | WEIGHT: 123 LBS

## 2018-05-01 PROCEDURE — 0503F POSTPARTUM CARE VISIT: CPT | Performed by: OBSTETRICS & GYNECOLOGY

## 2018-05-11 ENCOUNTER — EMPLOYEE WELLNESS (OUTPATIENT)
Dept: OTHER | Age: 33
End: 2018-05-11

## 2018-05-11 LAB
CHOLESTEROL/HDL RATIO: 4.1
CHOLESTEROL: 191 MG/DL
GLUCOSE BLD-MCNC: 99 MG/DL (ref 70–99)
HDLC SERPL-MCNC: 47 MG/DL
LDL CHOLESTEROL: 117 MG/DL (ref 0–130)
PATIENT FASTING?: YES
TRIGL SERPL-MCNC: 135 MG/DL
VLDLC SERPL CALC-MCNC: NORMAL MG/DL (ref 1–30)

## 2018-05-21 VITALS — WEIGHT: 125 LBS | BODY MASS INDEX: 22.86 KG/M2

## 2019-01-03 ENCOUNTER — HOSPITAL ENCOUNTER (OUTPATIENT)
Dept: CT IMAGING | Facility: CLINIC | Age: 34
Discharge: HOME | End: 2019-01-05
Payer: COMMERCIAL

## 2019-01-03 DIAGNOSIS — K42.9 UMBILICAL HERNIA WITHOUT OBSTRUCTION AND WITHOUT GANGRENE: ICD-10-CM

## 2019-01-03 PROCEDURE — 6360000004 HC RX CONTRAST MEDICATION: Performed by: FAMILY MEDICINE

## 2019-01-03 PROCEDURE — 2580000003 HC RX 258: Performed by: FAMILY MEDICINE

## 2019-01-03 PROCEDURE — 74177 CT ABD & PELVIS W/CONTRAST: CPT

## 2019-01-03 RX ORDER — SODIUM CHLORIDE 0.9 % (FLUSH) 0.9 %
10 SYRINGE (ML) INJECTION PRN
Status: DISCONTINUED | OUTPATIENT
Start: 2019-01-03 | End: 2019-01-06 | Stop reason: HOSPADM

## 2019-01-03 RX ORDER — 0.9 % SODIUM CHLORIDE 0.9 %
70 INTRAVENOUS SOLUTION INTRAVENOUS ONCE
Status: COMPLETED | OUTPATIENT
Start: 2019-01-03 | End: 2019-01-03

## 2019-01-03 RX ADMIN — IOHEXOL 50 ML: 240 INJECTION, SOLUTION INTRATHECAL; INTRAVASCULAR; INTRAVENOUS; ORAL at 13:13

## 2019-01-03 RX ADMIN — Medication 10 ML: at 13:14

## 2019-01-03 RX ADMIN — SODIUM CHLORIDE 70 ML: 9 INJECTION, SOLUTION INTRAVENOUS at 13:14

## 2019-01-03 RX ADMIN — IOPAMIDOL 75 ML: 755 INJECTION, SOLUTION INTRAVENOUS at 13:13

## 2019-01-10 ENCOUNTER — HOSPITAL ENCOUNTER (OUTPATIENT)
Age: 34
Discharge: HOME | End: 2019-01-10
Payer: COMMERCIAL

## 2019-01-10 LAB
ABSOLUTE EOS #: 0.12 K/UL (ref 0–0.44)
ABSOLUTE IMMATURE GRANULOCYTE: 0.03 K/UL (ref 0–0.3)
ABSOLUTE LYMPH #: 2.85 K/UL (ref 1.1–3.7)
ABSOLUTE MONO #: 0.43 K/UL (ref 0.1–1.2)
ANION GAP SERPL CALCULATED.3IONS-SCNC: 18 MMOL/L (ref 9–17)
BASOPHILS # BLD: 1 % (ref 0–2)
BASOPHILS ABSOLUTE: 0.04 K/UL (ref 0–0.2)
BUN BLDV-MCNC: 14 MG/DL (ref 6–20)
BUN/CREAT BLD: ABNORMAL (ref 9–20)
CALCIUM SERPL-MCNC: 9.6 MG/DL (ref 8.6–10.4)
CHLORIDE BLD-SCNC: 105 MMOL/L (ref 98–107)
CO2: 24 MMOL/L (ref 20–31)
CREAT SERPL-MCNC: 0.62 MG/DL (ref 0.5–0.9)
DIFFERENTIAL TYPE: ABNORMAL
EOSINOPHILS RELATIVE PERCENT: 1 % (ref 1–4)
GFR AFRICAN AMERICAN: >60 ML/MIN
GFR NON-AFRICAN AMERICAN: >60 ML/MIN
GFR SERPL CREATININE-BSD FRML MDRD: ABNORMAL ML/MIN/{1.73_M2}
GFR SERPL CREATININE-BSD FRML MDRD: ABNORMAL ML/MIN/{1.73_M2}
GLUCOSE BLD-MCNC: 98 MG/DL (ref 70–99)
HCT VFR BLD CALC: 44.2 % (ref 36.3–47.1)
HEMOGLOBIN: 15 G/DL (ref 11.9–15.1)
IMMATURE GRANULOCYTES: 0 %
LYMPHOCYTES # BLD: 34 % (ref 24–43)
MAGNESIUM: 2.1 MG/DL (ref 1.6–2.6)
MCH RBC QN AUTO: 31.9 PG (ref 25.2–33.5)
MCHC RBC AUTO-ENTMCNC: 33.9 G/DL (ref 28.4–34.8)
MCV RBC AUTO: 94 FL (ref 82.6–102.9)
MONOCYTES # BLD: 5 % (ref 3–12)
NRBC AUTOMATED: 0 PER 100 WBC
PDW BLD-RTO: 11.7 % (ref 11.8–14.4)
PLATELET # BLD: 219 K/UL (ref 138–453)
PLATELET ESTIMATE: ABNORMAL
PMV BLD AUTO: 10.5 FL (ref 8.1–13.5)
POTASSIUM SERPL-SCNC: 4.2 MMOL/L (ref 3.7–5.3)
RBC # BLD: 4.7 M/UL (ref 3.95–5.11)
RBC # BLD: ABNORMAL 10*6/UL
SEG NEUTROPHILS: 59 % (ref 36–65)
SEGMENTED NEUTROPHILS ABSOLUTE COUNT: 4.95 K/UL (ref 1.5–8.1)
SODIUM BLD-SCNC: 147 MMOL/L (ref 135–144)
WBC # BLD: 8.4 K/UL (ref 3.5–11.3)
WBC # BLD: ABNORMAL 10*3/UL

## 2019-01-10 PROCEDURE — 80048 BASIC METABOLIC PNL TOTAL CA: CPT

## 2019-01-10 PROCEDURE — 85025 COMPLETE CBC W/AUTO DIFF WBC: CPT

## 2019-01-10 PROCEDURE — 83735 ASSAY OF MAGNESIUM: CPT

## 2019-01-10 PROCEDURE — 36415 COLL VENOUS BLD VENIPUNCTURE: CPT

## 2019-01-15 ENCOUNTER — ANESTHESIA EVENT (OUTPATIENT)
Dept: OPERATING ROOM | Age: 34
End: 2019-01-15
Payer: COMMERCIAL

## 2019-01-16 ENCOUNTER — HOSPITAL ENCOUNTER (OUTPATIENT)
Age: 34
Setting detail: OUTPATIENT SURGERY
Discharge: HOME | End: 2019-01-16
Attending: SURGERY | Admitting: SURGERY
Payer: COMMERCIAL

## 2019-01-16 ENCOUNTER — ANESTHESIA (OUTPATIENT)
Dept: OPERATING ROOM | Age: 34
End: 2019-01-16
Payer: COMMERCIAL

## 2019-01-16 VITALS
BODY MASS INDEX: 24.11 KG/M2 | RESPIRATION RATE: 15 BRPM | HEART RATE: 95 BPM | DIASTOLIC BLOOD PRESSURE: 64 MMHG | OXYGEN SATURATION: 93 % | WEIGHT: 131 LBS | SYSTOLIC BLOOD PRESSURE: 105 MMHG | TEMPERATURE: 97.9 F | HEIGHT: 62 IN

## 2019-01-16 VITALS — DIASTOLIC BLOOD PRESSURE: 55 MMHG | OXYGEN SATURATION: 99 % | SYSTOLIC BLOOD PRESSURE: 97 MMHG | TEMPERATURE: 96.4 F

## 2019-01-16 DIAGNOSIS — Z98.890 S/P REPAIR OF VENTRAL HERNIA: ICD-10-CM

## 2019-01-16 DIAGNOSIS — N39.0 URINARY TRACT INFECTION WITHOUT HEMATURIA, SITE UNSPECIFIED: Primary | ICD-10-CM

## 2019-01-16 DIAGNOSIS — Z87.19 S/P HERNIA REPAIR: ICD-10-CM

## 2019-01-16 DIAGNOSIS — Z87.19 S/P REPAIR OF VENTRAL HERNIA: ICD-10-CM

## 2019-01-16 DIAGNOSIS — Z98.890 S/P HERNIA REPAIR: ICD-10-CM

## 2019-01-16 LAB
-: NORMAL
AMORPHOUS: NORMAL
BACTERIA: NORMAL
BILIRUBIN URINE: NEGATIVE
CASTS UA: NORMAL /LPF
COLOR: YELLOW
COMMENT UA: ABNORMAL
CRYSTALS, UA: NORMAL /HPF
EPITHELIAL CELLS UA: NORMAL /HPF (ref 0–5)
GLUCOSE URINE: NEGATIVE
HCG, PREGNANCY URINE (POC): NEGATIVE
KETONES, URINE: NEGATIVE
LEUKOCYTE ESTERASE, URINE: ABNORMAL
MUCUS: NORMAL
NITRITE, URINE: NEGATIVE
OTHER OBSERVATIONS UA: NORMAL
PH UA: 6.5 (ref 5–8)
PROTEIN UA: NEGATIVE
RBC UA: NORMAL /HPF (ref 0–2)
RENAL EPITHELIAL, UA: NORMAL /HPF
SPECIFIC GRAVITY UA: 1.01 (ref 1–1.03)
TRICHOMONAS: NORMAL
TURBIDITY: ABNORMAL
URINE HGB: NEGATIVE
UROBILINOGEN, URINE: NORMAL
WBC UA: NORMAL /HPF (ref 0–5)
YEAST: NORMAL

## 2019-01-16 PROCEDURE — 6360000002 HC RX W HCPCS: Performed by: NURSE ANESTHETIST, CERTIFIED REGISTERED

## 2019-01-16 PROCEDURE — 6370000000 HC RX 637 (ALT 250 FOR IP): Performed by: ANESTHESIOLOGY

## 2019-01-16 PROCEDURE — 7100000000 HC PACU RECOVERY - FIRST 15 MIN: Performed by: SURGERY

## 2019-01-16 PROCEDURE — 2709999900 HC NON-CHARGEABLE SUPPLY: Performed by: SURGERY

## 2019-01-16 PROCEDURE — 2500000003 HC RX 250 WO HCPCS: Performed by: NURSE ANESTHETIST, CERTIFIED REGISTERED

## 2019-01-16 PROCEDURE — 2580000003 HC RX 258: Performed by: ANESTHESIOLOGY

## 2019-01-16 PROCEDURE — 81001 URINALYSIS AUTO W/SCOPE: CPT

## 2019-01-16 PROCEDURE — 7100000001 HC PACU RECOVERY - ADDTL 15 MIN: Performed by: SURGERY

## 2019-01-16 PROCEDURE — 3600000003 HC SURGERY LEVEL 3 BASE: Performed by: SURGERY

## 2019-01-16 PROCEDURE — 6360000002 HC RX W HCPCS: Performed by: SURGERY

## 2019-01-16 PROCEDURE — C1781 MESH (IMPLANTABLE): HCPCS | Performed by: SURGERY

## 2019-01-16 PROCEDURE — 6360000002 HC RX W HCPCS: Performed by: ANESTHESIOLOGY

## 2019-01-16 PROCEDURE — 84703 CHORIONIC GONADOTROPIN ASSAY: CPT

## 2019-01-16 PROCEDURE — 3700000001 HC ADD 15 MINUTES (ANESTHESIA): Performed by: SURGERY

## 2019-01-16 PROCEDURE — 2580000003 HC RX 258: Performed by: SURGERY

## 2019-01-16 PROCEDURE — 87086 URINE CULTURE/COLONY COUNT: CPT

## 2019-01-16 PROCEDURE — 3600000013 HC SURGERY LEVEL 3 ADDTL 15MIN: Performed by: SURGERY

## 2019-01-16 PROCEDURE — 2720000010 HC SURG SUPPLY STERILE: Performed by: SURGERY

## 2019-01-16 PROCEDURE — 2500000003 HC RX 250 WO HCPCS: Performed by: SURGERY

## 2019-01-16 PROCEDURE — 3700000000 HC ANESTHESIA ATTENDED CARE: Performed by: SURGERY

## 2019-01-16 DEVICE — PATCH HERN M DIA2.5IN CIR W/ STRP SEPRA TECHNOLOGY ABSRB: Type: IMPLANTABLE DEVICE | Site: ABDOMEN | Status: FUNCTIONAL

## 2019-01-16 RX ORDER — PROPOFOL 10 MG/ML
INJECTION, EMULSION INTRAVENOUS PRN
Status: DISCONTINUED | OUTPATIENT
Start: 2019-01-16 | End: 2019-01-16 | Stop reason: SDUPTHER

## 2019-01-16 RX ORDER — DEXAMETHASONE SODIUM PHOSPHATE 10 MG/ML
INJECTION INTRAMUSCULAR; INTRAVENOUS PRN
Status: DISCONTINUED | OUTPATIENT
Start: 2019-01-16 | End: 2019-01-16 | Stop reason: SDUPTHER

## 2019-01-16 RX ORDER — ROCURONIUM BROMIDE 10 MG/ML
INJECTION, SOLUTION INTRAVENOUS PRN
Status: DISCONTINUED | OUTPATIENT
Start: 2019-01-16 | End: 2019-01-16 | Stop reason: SDUPTHER

## 2019-01-16 RX ORDER — OXYCODONE HYDROCHLORIDE AND ACETAMINOPHEN 5; 325 MG/1; MG/1
1 TABLET ORAL
Status: COMPLETED | OUTPATIENT
Start: 2019-01-16 | End: 2019-01-16

## 2019-01-16 RX ORDER — ACETAMINOPHEN 10 MG/ML
INJECTION, SOLUTION INTRAVENOUS PRN
Status: DISCONTINUED | OUTPATIENT
Start: 2019-01-16 | End: 2019-01-16 | Stop reason: SDUPTHER

## 2019-01-16 RX ORDER — HYDROCODONE BITARTRATE AND ACETAMINOPHEN 5; 325 MG/1; MG/1
1 TABLET ORAL EVERY 6 HOURS PRN
Qty: 12 TABLET | Refills: 0 | Status: SHIPPED | OUTPATIENT
Start: 2019-01-16 | End: 2019-01-19

## 2019-01-16 RX ORDER — MORPHINE SULFATE 10 MG/ML
INJECTION, SOLUTION INTRAMUSCULAR; INTRAVENOUS PRN
Status: DISCONTINUED | OUTPATIENT
Start: 2019-01-16 | End: 2019-01-16 | Stop reason: SDUPTHER

## 2019-01-16 RX ORDER — SCOLOPAMINE TRANSDERMAL SYSTEM 1 MG/1
1 PATCH, EXTENDED RELEASE TRANSDERMAL ONCE
Status: DISCONTINUED | OUTPATIENT
Start: 2019-01-16 | End: 2019-01-16 | Stop reason: HOSPADM

## 2019-01-16 RX ORDER — FENTANYL CITRATE 50 UG/ML
INJECTION, SOLUTION INTRAMUSCULAR; INTRAVENOUS PRN
Status: DISCONTINUED | OUTPATIENT
Start: 2019-01-16 | End: 2019-01-16 | Stop reason: SDUPTHER

## 2019-01-16 RX ORDER — SODIUM CHLORIDE 0.9 % (FLUSH) 0.9 %
10 SYRINGE (ML) INJECTION PRN
Status: DISCONTINUED | OUTPATIENT
Start: 2019-01-16 | End: 2019-01-16 | Stop reason: HOSPADM

## 2019-01-16 RX ORDER — LIDOCAINE HYDROCHLORIDE 10 MG/ML
1 INJECTION, SOLUTION EPIDURAL; INFILTRATION; INTRACAUDAL; PERINEURAL
Status: DISCONTINUED | OUTPATIENT
Start: 2019-01-16 | End: 2019-01-16 | Stop reason: HOSPADM

## 2019-01-16 RX ORDER — ONDANSETRON 2 MG/ML
INJECTION INTRAMUSCULAR; INTRAVENOUS PRN
Status: DISCONTINUED | OUTPATIENT
Start: 2019-01-16 | End: 2019-01-16 | Stop reason: SDUPTHER

## 2019-01-16 RX ORDER — CIPROFLOXACIN 500 MG/1
500 TABLET, FILM COATED ORAL 2 TIMES DAILY
Qty: 20 TABLET | Refills: 0 | Status: SHIPPED | OUTPATIENT
Start: 2019-01-16 | End: 2019-01-26

## 2019-01-16 RX ORDER — FENTANYL CITRATE 50 UG/ML
25 INJECTION, SOLUTION INTRAMUSCULAR; INTRAVENOUS EVERY 5 MIN PRN
Status: COMPLETED | OUTPATIENT
Start: 2019-01-16 | End: 2019-01-16

## 2019-01-16 RX ORDER — CEFAZOLIN SODIUM 2 G/50ML
SOLUTION INTRAVENOUS PRN
Status: DISCONTINUED | OUTPATIENT
Start: 2019-01-16 | End: 2019-01-16 | Stop reason: SDUPTHER

## 2019-01-16 RX ORDER — CEFAZOLIN SODIUM 2 G/50ML
2 SOLUTION INTRAVENOUS ONCE
Status: DISCONTINUED | OUTPATIENT
Start: 2019-01-16 | End: 2019-01-16 | Stop reason: HOSPADM

## 2019-01-16 RX ORDER — SODIUM CHLORIDE 9 MG/ML
INJECTION, SOLUTION INTRAVENOUS CONTINUOUS
Status: DISCONTINUED | OUTPATIENT
Start: 2019-01-17 | End: 2019-01-16

## 2019-01-16 RX ORDER — FENTANYL CITRATE 50 UG/ML
50 INJECTION, SOLUTION INTRAMUSCULAR; INTRAVENOUS EVERY 5 MIN PRN
Status: DISCONTINUED | OUTPATIENT
Start: 2019-01-16 | End: 2019-01-16 | Stop reason: HOSPADM

## 2019-01-16 RX ORDER — MIDAZOLAM HYDROCHLORIDE 1 MG/ML
INJECTION INTRAMUSCULAR; INTRAVENOUS PRN
Status: DISCONTINUED | OUTPATIENT
Start: 2019-01-16 | End: 2019-01-16 | Stop reason: SDUPTHER

## 2019-01-16 RX ORDER — ONDANSETRON 2 MG/ML
4 INJECTION INTRAMUSCULAR; INTRAVENOUS
Status: DISCONTINUED | OUTPATIENT
Start: 2019-01-16 | End: 2019-01-16 | Stop reason: HOSPADM

## 2019-01-16 RX ORDER — SODIUM CHLORIDE 0.9 % (FLUSH) 0.9 %
10 SYRINGE (ML) INJECTION EVERY 12 HOURS SCHEDULED
Status: DISCONTINUED | OUTPATIENT
Start: 2019-01-16 | End: 2019-01-16 | Stop reason: HOSPADM

## 2019-01-16 RX ORDER — SODIUM CHLORIDE, SODIUM LACTATE, POTASSIUM CHLORIDE, CALCIUM CHLORIDE 600; 310; 30; 20 MG/100ML; MG/100ML; MG/100ML; MG/100ML
INJECTION, SOLUTION INTRAVENOUS CONTINUOUS
Status: DISCONTINUED | OUTPATIENT
Start: 2019-01-17 | End: 2019-01-16 | Stop reason: HOSPADM

## 2019-01-16 RX ORDER — LIDOCAINE HYDROCHLORIDE 20 MG/ML
INJECTION, SOLUTION EPIDURAL; INFILTRATION; INTRACAUDAL; PERINEURAL PRN
Status: DISCONTINUED | OUTPATIENT
Start: 2019-01-16 | End: 2019-01-16 | Stop reason: SDUPTHER

## 2019-01-16 RX ADMIN — DEXAMETHASONE SODIUM PHOSPHATE 10 MG: 10 INJECTION INTRAMUSCULAR; INTRAVENOUS at 11:16

## 2019-01-16 RX ADMIN — FENTANYL CITRATE 25 MCG: 50 INJECTION, SOLUTION INTRAMUSCULAR; INTRAVENOUS at 13:44

## 2019-01-16 RX ADMIN — SUGAMMADEX 200 MG: 100 INJECTION, SOLUTION INTRAVENOUS at 12:36

## 2019-01-16 RX ADMIN — MORPHINE SULFATE 2 MG: 10 INJECTION INTRAVENOUS at 12:04

## 2019-01-16 RX ADMIN — SODIUM CHLORIDE, POTASSIUM CHLORIDE, SODIUM LACTATE AND CALCIUM CHLORIDE: 600; 310; 30; 20 INJECTION, SOLUTION INTRAVENOUS at 10:50

## 2019-01-16 RX ADMIN — SODIUM CHLORIDE, POTASSIUM CHLORIDE, SODIUM LACTATE AND CALCIUM CHLORIDE: 600; 310; 30; 20 INJECTION, SOLUTION INTRAVENOUS at 12:17

## 2019-01-16 RX ADMIN — FENTANYL CITRATE 50 MCG: 50 INJECTION, SOLUTION INTRAMUSCULAR; INTRAVENOUS at 11:09

## 2019-01-16 RX ADMIN — FENTANYL CITRATE 25 MCG: 50 INJECTION, SOLUTION INTRAMUSCULAR; INTRAVENOUS at 14:06

## 2019-01-16 RX ADMIN — MORPHINE SULFATE 2 MG: 10 INJECTION INTRAVENOUS at 12:10

## 2019-01-16 RX ADMIN — FENTANYL CITRATE 25 MCG: 50 INJECTION, SOLUTION INTRAMUSCULAR; INTRAVENOUS at 14:16

## 2019-01-16 RX ADMIN — MORPHINE SULFATE 2 MG: 10 INJECTION INTRAVENOUS at 12:02

## 2019-01-16 RX ADMIN — ONDANSETRON 4 MG: 2 INJECTION, SOLUTION INTRAMUSCULAR; INTRAVENOUS at 12:49

## 2019-01-16 RX ADMIN — FENTANYL CITRATE 100 MCG: 50 INJECTION, SOLUTION INTRAMUSCULAR; INTRAVENOUS at 10:56

## 2019-01-16 RX ADMIN — ACETAMINOPHEN 1000 MG: 10 INJECTION, SOLUTION INTRAVENOUS at 12:20

## 2019-01-16 RX ADMIN — FENTANYL CITRATE 50 MCG: 50 INJECTION, SOLUTION INTRAMUSCULAR; INTRAVENOUS at 11:13

## 2019-01-16 RX ADMIN — OXYCODONE AND ACETAMINOPHEN 1 TABLET: 5; 325 TABLET ORAL at 16:13

## 2019-01-16 RX ADMIN — PROPOFOL 200 MG: 10 INJECTION, EMULSION INTRAVENOUS at 10:56

## 2019-01-16 RX ADMIN — MORPHINE SULFATE 2 MG: 10 INJECTION INTRAVENOUS at 12:18

## 2019-01-16 RX ADMIN — FENTANYL CITRATE 25 MCG: 50 INJECTION, SOLUTION INTRAMUSCULAR; INTRAVENOUS at 14:38

## 2019-01-16 RX ADMIN — ROCURONIUM BROMIDE 10 MG: 10 INJECTION, SOLUTION INTRAVENOUS at 12:04

## 2019-01-16 RX ADMIN — CEFAZOLIN SODIUM 2 G: 2 SOLUTION INTRAVENOUS at 11:05

## 2019-01-16 RX ADMIN — LIDOCAINE HYDROCHLORIDE 100 MG: 20 INJECTION, SOLUTION EPIDURAL; INFILTRATION; INTRACAUDAL; PERINEURAL at 10:56

## 2019-01-16 RX ADMIN — SODIUM CHLORIDE, POTASSIUM CHLORIDE, SODIUM LACTATE AND CALCIUM CHLORIDE: 600; 310; 30; 20 INJECTION, SOLUTION INTRAVENOUS at 09:49

## 2019-01-16 RX ADMIN — FENTANYL CITRATE 50 MCG: 50 INJECTION, SOLUTION INTRAMUSCULAR; INTRAVENOUS at 11:19

## 2019-01-16 RX ADMIN — MIDAZOLAM 2 MG: 1 INJECTION INTRAMUSCULAR; INTRAVENOUS at 10:50

## 2019-01-16 RX ADMIN — ROCURONIUM BROMIDE 50 MG: 10 INJECTION, SOLUTION INTRAVENOUS at 10:57

## 2019-01-16 ASSESSMENT — PULMONARY FUNCTION TESTS
PIF_VALUE: 15
PIF_VALUE: 24
PIF_VALUE: 18
PIF_VALUE: 10
PIF_VALUE: 16
PIF_VALUE: 18
PIF_VALUE: 15
PIF_VALUE: 22
PIF_VALUE: 19
PIF_VALUE: 22
PIF_VALUE: 15
PIF_VALUE: 22
PIF_VALUE: 15
PIF_VALUE: 22
PIF_VALUE: 10
PIF_VALUE: 15
PIF_VALUE: 22
PIF_VALUE: 18
PIF_VALUE: 15
PIF_VALUE: 24
PIF_VALUE: 18
PIF_VALUE: 21
PIF_VALUE: 0
PIF_VALUE: 19
PIF_VALUE: 23
PIF_VALUE: 16
PIF_VALUE: 24
PIF_VALUE: 19
PIF_VALUE: 21
PIF_VALUE: 20
PIF_VALUE: 15
PIF_VALUE: 22
PIF_VALUE: 15
PIF_VALUE: 24
PIF_VALUE: 23
PIF_VALUE: 20
PIF_VALUE: 29
PIF_VALUE: 24
PIF_VALUE: 24
PIF_VALUE: 23
PIF_VALUE: 22
PIF_VALUE: 22
PIF_VALUE: 17
PIF_VALUE: 16
PIF_VALUE: 15
PIF_VALUE: 15
PIF_VALUE: 20
PIF_VALUE: 1
PIF_VALUE: 19
PIF_VALUE: 19
PIF_VALUE: 32
PIF_VALUE: 19
PIF_VALUE: 2
PIF_VALUE: 23
PIF_VALUE: 16
PIF_VALUE: 2
PIF_VALUE: 15
PIF_VALUE: 24
PIF_VALUE: 18
PIF_VALUE: 24
PIF_VALUE: 1
PIF_VALUE: 15
PIF_VALUE: 21
PIF_VALUE: 19
PIF_VALUE: 22
PIF_VALUE: 20
PIF_VALUE: 22
PIF_VALUE: 22
PIF_VALUE: 15
PIF_VALUE: 3
PIF_VALUE: 15
PIF_VALUE: 22
PIF_VALUE: 16
PIF_VALUE: 18
PIF_VALUE: 16
PIF_VALUE: 15
PIF_VALUE: 15
PIF_VALUE: 19
PIF_VALUE: 15
PIF_VALUE: 3
PIF_VALUE: 16
PIF_VALUE: 15
PIF_VALUE: 24
PIF_VALUE: 19
PIF_VALUE: 16
PIF_VALUE: 15
PIF_VALUE: 24
PIF_VALUE: 26
PIF_VALUE: 23
PIF_VALUE: 10
PIF_VALUE: 19
PIF_VALUE: 18
PIF_VALUE: 22
PIF_VALUE: 19
PIF_VALUE: 15
PIF_VALUE: 19
PIF_VALUE: 16
PIF_VALUE: 22
PIF_VALUE: 10
PIF_VALUE: 15
PIF_VALUE: 22
PIF_VALUE: 17
PIF_VALUE: 22
PIF_VALUE: 22
PIF_VALUE: 15
PIF_VALUE: 1
PIF_VALUE: 22
PIF_VALUE: 15
PIF_VALUE: 16
PIF_VALUE: 19
PIF_VALUE: 24
PIF_VALUE: 22
PIF_VALUE: 19
PIF_VALUE: 19
PIF_VALUE: 3
PIF_VALUE: 15
PIF_VALUE: 22
PIF_VALUE: 2
PIF_VALUE: 24
PIF_VALUE: 0
PIF_VALUE: 16
PIF_VALUE: 18
PIF_VALUE: 23

## 2019-01-16 ASSESSMENT — PAIN SCALES - GENERAL
PAINLEVEL_OUTOF10: 5
PAINLEVEL_OUTOF10: 5
PAINLEVEL_OUTOF10: 4

## 2019-01-16 ASSESSMENT — PAIN DESCRIPTION - LOCATION: LOCATION: ABDOMEN

## 2019-01-16 ASSESSMENT — PAIN - FUNCTIONAL ASSESSMENT: PAIN_FUNCTIONAL_ASSESSMENT: 0-10

## 2019-01-16 ASSESSMENT — PAIN DESCRIPTION - PAIN TYPE: TYPE: SURGICAL PAIN

## 2019-01-16 ASSESSMENT — PAIN DESCRIPTION - ONSET: ONSET: GRADUAL

## 2019-01-17 LAB
CULTURE: NO GROWTH
Lab: NORMAL
SPECIMEN DESCRIPTION: NORMAL
STATUS: NORMAL

## 2020-05-13 ENCOUNTER — TELEMEDICINE (OUTPATIENT)
Dept: OBGYN CLINIC | Age: 35
End: 2020-05-13
Payer: COMMERCIAL

## 2020-05-13 VITALS — BODY MASS INDEX: 21.71 KG/M2 | HEIGHT: 62 IN | WEIGHT: 118 LBS

## 2020-05-13 PROCEDURE — 99213 OFFICE O/P EST LOW 20 MIN: CPT | Performed by: OBSTETRICS & GYNECOLOGY

## 2020-05-13 NOTE — PROGRESS NOTES
seek emergency medical treatment and/or call 911 if deemed necessary. Services were provided through a video synchronous discussion virtually to substitute for in-person clinic visit. Patient and provider were located at their individual homes. More than 50% of this 15 minute visit was spent counseling the patient. Return in about 4 weeks (around 6/10/2020) for Ob history.     Enid Hernandez MD

## 2020-05-18 ENCOUNTER — HOSPITAL ENCOUNTER (OUTPATIENT)
Dept: ULTRASOUND IMAGING | Age: 35
Discharge: HOME | End: 2020-05-20
Payer: COMMERCIAL

## 2020-05-18 PROCEDURE — 76817 TRANSVAGINAL US OBSTETRIC: CPT

## 2020-05-18 PROCEDURE — 76801 OB US < 14 WKS SINGLE FETUS: CPT

## 2020-05-26 ENCOUNTER — TELEPHONE (OUTPATIENT)
Dept: OBGYN CLINIC | Age: 35
End: 2020-05-26

## 2020-05-26 ENCOUNTER — HOSPITAL ENCOUNTER (OUTPATIENT)
Dept: ULTRASOUND IMAGING | Facility: CLINIC | Age: 35
Discharge: HOME | End: 2020-05-28
Payer: COMMERCIAL

## 2020-05-26 PROCEDURE — 76801 OB US < 14 WKS SINGLE FETUS: CPT

## 2020-05-26 PROCEDURE — 76817 TRANSVAGINAL US OBSTETRIC: CPT

## 2020-05-28 ENCOUNTER — TELEPHONE (OUTPATIENT)
Dept: OBGYN CLINIC | Age: 35
End: 2020-05-28

## 2020-05-28 NOTE — TELEPHONE ENCOUNTER
Yes please have her do a hcg in a week. She should follow up in 2 weeks or so. Please let her know she might have some very heavy bleeding and cramping and that is normal. She can take 600 mg Motrin q 6 hrs and 2 extra strength Tylenol in between if she needs to.  Thanks

## 2020-05-28 NOTE — TELEPHONE ENCOUNTER
Pt called to let us know she started bleeding and they have decided she would like to try and pass the pregnancy on her own. She asked how we will know if she passed everything. Advised her we will most likely trend her HCG level. Would you like that next week? Does she need a follow up apt?

## 2020-06-12 ENCOUNTER — APPOINTMENT (OUTPATIENT)
Dept: ULTRASOUND IMAGING | Facility: CLINIC | Age: 35
End: 2020-06-12
Payer: COMMERCIAL

## 2020-06-12 ENCOUNTER — HOSPITAL ENCOUNTER (EMERGENCY)
Facility: CLINIC | Age: 35
Discharge: HOME | End: 2020-06-12
Attending: EMERGENCY MEDICINE
Payer: COMMERCIAL

## 2020-06-12 VITALS
WEIGHT: 115 LBS | RESPIRATION RATE: 18 BRPM | SYSTOLIC BLOOD PRESSURE: 101 MMHG | DIASTOLIC BLOOD PRESSURE: 65 MMHG | HEIGHT: 61 IN | HEART RATE: 92 BPM | OXYGEN SATURATION: 97 % | TEMPERATURE: 98.3 F | BODY MASS INDEX: 21.71 KG/M2

## 2020-06-12 LAB
ABSOLUTE EOS #: 0.2 K/UL (ref 0–0.4)
ABSOLUTE IMMATURE GRANULOCYTE: ABNORMAL K/UL (ref 0–0.3)
ABSOLUTE LYMPH #: 2.8 K/UL (ref 1–4.8)
ABSOLUTE MONO #: 0.4 K/UL (ref 0.1–1.2)
ALBUMIN SERPL-MCNC: 4.1 G/DL (ref 3.5–5.2)
ALBUMIN/GLOBULIN RATIO: 1.6 (ref 1–2.5)
ALP BLD-CCNC: 37 U/L (ref 35–104)
ALT SERPL-CCNC: 14 U/L (ref 5–33)
ANION GAP SERPL CALCULATED.3IONS-SCNC: 8 MMOL/L (ref 9–17)
AST SERPL-CCNC: 18 U/L
BASOPHILS # BLD: 1 % (ref 0–2)
BASOPHILS ABSOLUTE: 0 K/UL (ref 0–0.2)
BILIRUB SERPL-MCNC: 0.3 MG/DL (ref 0.3–1.2)
BUN BLDV-MCNC: 17 MG/DL (ref 6–20)
BUN/CREAT BLD: ABNORMAL (ref 9–20)
CALCIUM SERPL-MCNC: 8.8 MG/DL (ref 8.6–10.4)
CHLORIDE BLD-SCNC: 109 MMOL/L (ref 98–107)
CO2: 23 MMOL/L (ref 20–31)
CREAT SERPL-MCNC: 0.6 MG/DL (ref 0.5–0.9)
DIFFERENTIAL TYPE: ABNORMAL
DIRECT EXAM: NORMAL
EOSINOPHILS RELATIVE PERCENT: 3 % (ref 1–4)
GFR AFRICAN AMERICAN: >60 ML/MIN
GFR NON-AFRICAN AMERICAN: >60 ML/MIN
GFR SERPL CREATININE-BSD FRML MDRD: ABNORMAL ML/MIN/{1.73_M2}
GFR SERPL CREATININE-BSD FRML MDRD: ABNORMAL ML/MIN/{1.73_M2}
GLUCOSE BLD-MCNC: 126 MG/DL (ref 70–99)
HCG QUANTITATIVE: 315 IU/L
HCT VFR BLD CALC: 22.9 % (ref 36–46)
HEMOGLOBIN: 7.7 G/DL (ref 12–16)
IMMATURE GRANULOCYTES: ABNORMAL %
LACTIC ACID: 1.8 MMOL/L (ref 0.5–2.2)
LYMPHOCYTES # BLD: 35 % (ref 24–44)
Lab: NORMAL
MCH RBC QN AUTO: 32.9 PG (ref 26–34)
MCHC RBC AUTO-ENTMCNC: 33.6 G/DL (ref 31–37)
MCV RBC AUTO: 97.7 FL (ref 80–100)
MONOCYTES # BLD: 6 % (ref 2–11)
NRBC AUTOMATED: ABNORMAL PER 100 WBC
PDW BLD-RTO: 12.4 % (ref 12.5–15.4)
PLATELET # BLD: 234 K/UL (ref 140–450)
PLATELET ESTIMATE: ABNORMAL
PMV BLD AUTO: 8 FL (ref 6–12)
POTASSIUM SERPL-SCNC: 4.1 MMOL/L (ref 3.7–5.3)
RBC # BLD: 2.34 M/UL (ref 4–5.2)
RBC # BLD: ABNORMAL 10*6/UL
SEG NEUTROPHILS: 55 % (ref 36–66)
SEGMENTED NEUTROPHILS ABSOLUTE COUNT: 4.4 K/UL (ref 1.8–7.7)
SODIUM BLD-SCNC: 140 MMOL/L (ref 135–144)
SPECIMEN DESCRIPTION: NORMAL
TOTAL PROTEIN: 6.6 G/DL (ref 6.4–8.3)
WBC # BLD: 7.9 K/UL (ref 3.5–11)
WBC # BLD: ABNORMAL 10*3/UL

## 2020-06-12 PROCEDURE — 96376 TX/PRO/DX INJ SAME DRUG ADON: CPT

## 2020-06-12 PROCEDURE — 2580000003 HC RX 258: Performed by: EMERGENCY MEDICINE

## 2020-06-12 PROCEDURE — 96375 TX/PRO/DX INJ NEW DRUG ADDON: CPT

## 2020-06-12 PROCEDURE — 87591 N.GONORRHOEAE DNA AMP PROB: CPT

## 2020-06-12 PROCEDURE — 6360000002 HC RX W HCPCS: Performed by: EMERGENCY MEDICINE

## 2020-06-12 PROCEDURE — 87510 GARDNER VAG DNA DIR PROBE: CPT

## 2020-06-12 PROCEDURE — 76856 US EXAM PELVIC COMPLETE: CPT

## 2020-06-12 PROCEDURE — 36415 COLL VENOUS BLD VENIPUNCTURE: CPT

## 2020-06-12 PROCEDURE — 96374 THER/PROPH/DIAG INJ IV PUSH: CPT

## 2020-06-12 PROCEDURE — 87660 TRICHOMONAS VAGIN DIR PROBE: CPT

## 2020-06-12 PROCEDURE — 84702 CHORIONIC GONADOTROPIN TEST: CPT

## 2020-06-12 PROCEDURE — 76830 TRANSVAGINAL US NON-OB: CPT

## 2020-06-12 PROCEDURE — 99284 EMERGENCY DEPT VISIT MOD MDM: CPT

## 2020-06-12 PROCEDURE — 87491 CHLMYD TRACH DNA AMP PROBE: CPT

## 2020-06-12 PROCEDURE — 80053 COMPREHEN METABOLIC PANEL: CPT

## 2020-06-12 PROCEDURE — 87480 CANDIDA DNA DIR PROBE: CPT

## 2020-06-12 PROCEDURE — 83605 ASSAY OF LACTIC ACID: CPT

## 2020-06-12 PROCEDURE — 85025 COMPLETE CBC W/AUTO DIFF WBC: CPT

## 2020-06-12 RX ORDER — MORPHINE SULFATE 2 MG/ML
2 INJECTION, SOLUTION INTRAMUSCULAR; INTRAVENOUS ONCE
Status: COMPLETED | OUTPATIENT
Start: 2020-06-12 | End: 2020-06-12

## 2020-06-12 RX ORDER — HYDROCODONE BITARTRATE AND ACETAMINOPHEN 5; 325 MG/1; MG/1
1 TABLET ORAL EVERY 6 HOURS PRN
Qty: 12 TABLET | Refills: 0 | Status: SHIPPED | OUTPATIENT
Start: 2020-06-12 | End: 2020-06-15

## 2020-06-12 RX ORDER — METHYLERGONOVINE MALEATE 0.2 MG/1
0.2 TABLET ORAL EVERY 8 HOURS
Qty: 3 TABLET | Refills: 0 | Status: SHIPPED | OUTPATIENT
Start: 2020-06-12 | End: 2020-06-17

## 2020-06-12 RX ORDER — ONDANSETRON 2 MG/ML
4 INJECTION INTRAMUSCULAR; INTRAVENOUS ONCE
Status: COMPLETED | OUTPATIENT
Start: 2020-06-12 | End: 2020-06-12

## 2020-06-12 RX ORDER — 0.9 % SODIUM CHLORIDE 0.9 %
1000 INTRAVENOUS SOLUTION INTRAVENOUS ONCE
Status: COMPLETED | OUTPATIENT
Start: 2020-06-12 | End: 2020-06-12

## 2020-06-12 RX ADMIN — SODIUM CHLORIDE 1000 ML: 9 INJECTION, SOLUTION INTRAVENOUS at 06:32

## 2020-06-12 RX ADMIN — Medication 2 MG: at 06:35

## 2020-06-12 RX ADMIN — MORPHINE SULFATE 2 MG: 2 INJECTION, SOLUTION INTRAMUSCULAR; INTRAVENOUS at 09:17

## 2020-06-12 RX ADMIN — Medication 2 MG: at 07:54

## 2020-06-12 RX ADMIN — ONDANSETRON HYDROCHLORIDE 4 MG: 2 INJECTION, SOLUTION INTRAVENOUS at 06:34

## 2020-06-12 ASSESSMENT — PAIN SCALES - GENERAL
PAINLEVEL_OUTOF10: 6
PAINLEVEL_OUTOF10: 3
PAINLEVEL_OUTOF10: 5
PAINLEVEL_OUTOF10: 3
PAINLEVEL_OUTOF10: 0
PAINLEVEL_OUTOF10: 7
PAINLEVEL_OUTOF10: 6

## 2020-06-12 ASSESSMENT — PAIN DESCRIPTION - ORIENTATION: ORIENTATION: LOWER;MID

## 2020-06-12 ASSESSMENT — PAIN DESCRIPTION - FREQUENCY: FREQUENCY: CONTINUOUS

## 2020-06-12 ASSESSMENT — PAIN DESCRIPTION - ONSET: ONSET: ON-GOING

## 2020-06-12 ASSESSMENT — PAIN DESCRIPTION - DESCRIPTORS: DESCRIPTORS: CRAMPING

## 2020-06-12 ASSESSMENT — ENCOUNTER SYMPTOMS
ALLERGIC/IMMUNOLOGIC NEGATIVE: 1
ABDOMINAL PAIN: 1
EYES NEGATIVE: 1
RESPIRATORY NEGATIVE: 1

## 2020-06-12 ASSESSMENT — PAIN DESCRIPTION - PAIN TYPE
TYPE: ACUTE PAIN

## 2020-06-12 ASSESSMENT — PAIN DESCRIPTION - LOCATION
LOCATION: ABDOMEN
LOCATION: ABDOMEN

## 2020-06-12 ASSESSMENT — PAIN DESCRIPTION - PROGRESSION: CLINICAL_PROGRESSION: GRADUALLY IMPROVING

## 2020-06-12 NOTE — ED PROVIDER NOTES
Patient's care was transferred to me at change of shift by Dr. Alize Padilla who performed the initial evaluation. Patient was recently pregnant with an LMP on 22 March. A pelvic ultrasound on 18 May showed a 6-week intrauterine gestation. A subsequent pelvic ultrasound some days later failed to show the pregnancy advancing as expected and the patient started with vaginal bleeding, the flow being about the same that she normally experiences with a menstrual period. 6 days ago she had heavy vaginal bleeding that has tapered since then and is minimal now she came in today because of crampy pain in the lower abdomen and pelvis. Her quantitative hCG is down to 315. Pelvic ultrasound shows some debris in the uterus. Patient's hemoglobin is down to 7.7 and she reports some orthostatic lightheadedness but no syncope. She has an appointment with her obstetrician Dr. Kulwant Villalobos next week and I spoke with her associate today. Patient is placed on Methergine 0.2 mg every 8 hours for 3 doses. She is also prescribed Norco for pain and may also take ibuprofen. She is advised to take an iron tablet and vitamin C every day with food. Summation      Patient Course: Discharged. ED Medications administered this visit:    Medications   ondansetron (ZOFRAN) injection 4 mg (4 mg Intravenous Given 6/12/20 0634)   0.9 % sodium chloride bolus (0 mLs Intravenous Stopped 6/12/20 0815)   morphine (PF) injection 2 mg (2 mg Intravenous Given 6/12/20 0635)   morphine (PF) injection 2 mg (2 mg Intravenous Given 6/12/20 0754)   morphine (PF) injection 2 mg (2 mg Intravenous Given 6/12/20 0917)       New Prescriptions from this visit:    Discharge Medication List as of 6/12/2020 10:33 AM      START taking these medications    Details   HYDROcodone-acetaminophen (NORCO) 5-325 MG per tablet Take 1 tablet by mouth every 6 hours as needed for Pain for up to 3 days. , Disp-12 tablet, R-0Print      methylergonovine (METHERGINE) 0.2 MG tablet Take 1 tablet by mouth every 8 hours, Disp-3 tablet, R-0Print             Follow-up:  Alfredo Castaneda MD  [address] [facility] [address]  [phone]      As scheduled    [facility] ED  [address]  [phone]    As needed, If symptoms worsen        Final Impression:   1. Spontaneous     2.  Anemia, unspecified type               (Please note that portions of this note were completed with a voice recognition program.  Efforts were made to edit the dictations but occasionally words are mis-transcribed.)     Mari Whelan MD  20 [phone]

## 2020-06-12 NOTE — ED NOTES
Pt presents to the ED via private auto accompanied by her . Pt states she has experienced a miscarriage on this past Sunday confirmed by her OB GYN. Pt began to experience severe lower mid-abdominal pain described as cramping this am. Pt states she is continuing to have vaginal bleeding but it has not increased.       Hugo Gomez RN  06/12/20 0972

## 2020-06-12 NOTE — ED PROVIDER NOTES
eMERGENCY dEPARTMENT eNCOUnter      Pt Name: Tigist Valdez  MRN: [id number]  [place] 1985  Date of evaluation: 6/12/2020      CHIEF COMPLAINT       Chief Complaint   Patient presents with    Abdominal Pain     since this past Sunday          [address] Puneet Lanier is a 36 y.o. female who presents to the emergency department for evaluation of generalized lower abdominal pain that she is been having for the last day or 2 status post having had a spontaneous miscarriage at home. Patient states that she is having some light amount of bleeding as well. Rating her pain about 7-8 out of 10 without palliative or provocative. REVIEW OF SYSTEMS       Review of Systems   Constitutional: Negative. HENT: Negative. Eyes: Negative. Respiratory: Negative. Cardiovascular: Negative. Gastrointestinal: Positive for abdominal pain. Endocrine: Negative. Genitourinary: Positive for vaginal bleeding. Musculoskeletal: Negative. Skin: Negative. Allergic/Immunologic: Negative. Neurological: Negative. Hematological: Negative. Psychiatric/Behavioral: Negative. PAST MEDICAL HISTORY    has a past medical history of Abnormal Pap smear, Adopted, Asthma, and Tachycardia. SURGICAL HISTORY      has a past surgical history that includes LEEP (8/2007); Dental surgery; Wrist surgery; laparoscopy (01/16/2019); and ventral hernia repair (N/A, 1/16/2019).     CURRENT MEDICATIONS       Discharge Medication List as of 6/12/2020 10:33 AM      CONTINUE these medications which have NOT CHANGED    Details   acetaminophen (TYLENOL) 500 MG tablet Take 2 tablets by mouth every 6 hours as needed for Pain, Disp-40 tablet, R-3Print      ibuprofen (ADVIL;MOTRIN) 800 MG tablet Take 1 tablet by mouth every 8 hours as needed for Pain, Disp-40 tablet, R-0Print      Prenatal Vit w/Ud-Drdjjdlji-JY (PNV PO) Take by mouthHistorical Med      famotidine (PEPCID) 20 MG tablet Take 1 tablet by mouth 2 (Please note that portions of this note were completed with a voice recognition program.  Efforts were made to edit the dictations but occasionally words are mis-transcribed.)    Camarena MD  Attending Emergency Physician            Jason Ramirez MD  06/14/20 Andrey De La Garza III, MD  06/14/20 [id number]

## 2020-06-15 LAB
C TRACH DNA GENITAL QL NAA+PROBE: NEGATIVE
N. GONORRHOEAE DNA: NEGATIVE
SPECIMEN DESCRIPTION: NORMAL

## 2020-06-17 ENCOUNTER — OFFICE VISIT (OUTPATIENT)
Dept: OBGYN CLINIC | Age: 35
End: 2020-06-17
Payer: COMMERCIAL

## 2020-06-17 VITALS
HEIGHT: 61 IN | WEIGHT: 121 LBS | DIASTOLIC BLOOD PRESSURE: 74 MMHG | SYSTOLIC BLOOD PRESSURE: 118 MMHG | BODY MASS INDEX: 22.84 KG/M2 | HEART RATE: 79 BPM

## 2020-06-17 PROCEDURE — 99213 OFFICE O/P EST LOW 20 MIN: CPT | Performed by: OBSTETRICS & GYNECOLOGY

## 2020-06-17 ASSESSMENT — ENCOUNTER SYMPTOMS
SHORTNESS OF BREATH: 0
ABDOMINAL PAIN: 0
COUGH: 0
BACK PAIN: 0

## 2020-06-17 NOTE — PROGRESS NOTES
[facility]  [facility] - [address]  Dept: [phone]  Dept Fax: [phone]    20    Chief Complaint   Patient presents with    Other     follow up miscarriage, pt is lightly bleeding still       Christian Martinez 36 y.o. is here for follow up from her miscarriage. She had passed some products on  and then had a lot of bleeding and pain. She went to the ER on  for pain. They did an ultrasound that showed that she had no retained products. She is having very light bleeding at this time, but her pain has completely resolved. She and her  are thinking they would like to try to conceive again in the future, but don't know how soon. Likely in the next 6 months though. Review of Systems   Constitutional: Negative for chills and fever. HENT: Negative for congestion. Respiratory: Negative for cough and shortness of breath. Cardiovascular: Negative for chest pain and palpitations. Gastrointestinal: Negative for abdominal pain. Genitourinary: Positive for vaginal bleeding. Musculoskeletal: Negative for back pain. Neurological: Negative for dizziness and light-headedness. Psychiatric/Behavioral: The patient is not nervous/anxious. Gynecologic History  Patient's last menstrual period was 2020 (approximate).   Contraception: none  Last Pap: 17  Results: normal  Last Mammogram: n/a    Obstetric History  : 3  Para: 2  AB: 1    Past Medical History:   Diagnosis Date    Abnormal Pap smear     HGSIL    Adopted     Asthma     Tachycardia      Past Surgical History:   Procedure Laterality Date    DENTAL SURGERY      LAPAROSCOPY  2019    diagnostic, with open hernia ventral repair and mesh by Dr. Lynnette PHOENIX  2007    HGSIL    VENTRAL HERNIA REPAIR N/A 2019    DIAGNOSTIC LAPAROSCOPY WITH OPEN HERNIA VENTRAL REPAIR & MESH performed by Barrie Reynolds MD at [facility]

## 2020-07-24 NOTE — PROGRESS NOTES
Occupational Therapy       [facility] OT Note:    Patient not seen in therapy today.     Re-attempt plan: later today and tomorrow    Pt down for kidney biopsy. He will be on bed rest then 4 to 6 hrs per discussion with RN. Will re-attempt as schedule allows.                       Documented in the chart in the following areas: Assessment. Plan.       Resident Interval Magnesium Sulfate Note    Mervin Starkey is a 33 y.o. female  PPD# 0 s/p   The patient is resting comfortably. She denies headache, visual changes, abdominal pain in the right upper quadrant, vaginal bleeding and discharge and nausea. She denies any shortness of breath or chest pain. She denies change in her extremities, regarding swelling. Continuous Medications:    oxytocin 1 jason-units/min (03/15/18 0421)    magnesium sulfate 2 g/hr (03/15/18 0609)    sodium chloride 75 mL/hr at 18 1945       Vitals:    Vitals:    03/15/18 0706 03/15/18 0711 03/15/18 0745 03/15/18 1000   BP:   (!) 138/95 128/88   Pulse:   101 119   Resp:   18 16   Temp:   99.1 °F (37.3 °C) 98.2 °F (36.8 °C)   TempSrc:   Oral Oral   SpO2: 97% 96% 95% 98%   Weight:       Height:           Physical Exam:  Chest: clear to auscultation bilaterally  Heart: RRR no murmur  Abdomen: soft, nontender, nondistended  Extremities: DTR increased in B/L LE Right: 3/4   Left: 3/4, normal 2/4 in B/L UE  Clonus: absent    Urine Output: 162.5 cc/hr; red urine    Labs:  Last Magnesium Level:   No results found for: MG    BMP:    Recent Labs      18   1815   NA  135   K  3.7   CL  99   CO2  20   BUN  11   CREATININE  0.42*   GLUCOSE  73       ASSESSMENT/PLAN  Mervin Starkey is a 33 y.o. female  PPD# 0 s/p     - Continue Magnesium Sulfate Treatment, until 0400 3/16   - No severe range BPs    - Denies s/s of Pre E    - Strict I's and O's   - SCDs    - Pre E labs WNL except PLT, P/C ratio 0.24     Thrombocytopenia    - Improving, PLT 90>98    Uterine atony immediately postpartum   -  ml   - Denies heavy lochia    - Fundus firm 2cm below umbilicus   - VSS, denies s/s of anemia    - S/P Hemabate x1, 600mcg buccal Cytotec     Continue postpartum care.        Rommel Curran DO  OB/GYN Resident, PGY1  [address] [facility]   3/15/2018 11:23 AM

## 2020-08-31 ENCOUNTER — HOSPITAL ENCOUNTER (OUTPATIENT)
Age: 35
Setting detail: SPECIMEN
Discharge: HOME | End: 2020-08-31
Payer: COMMERCIAL

## 2020-08-31 ENCOUNTER — OFFICE VISIT (OUTPATIENT)
Dept: OBGYN CLINIC | Age: 35
End: 2020-08-31
Payer: COMMERCIAL

## 2020-08-31 VITALS
DIASTOLIC BLOOD PRESSURE: 74 MMHG | WEIGHT: 120.6 LBS | BODY MASS INDEX: 22.19 KG/M2 | HEART RATE: 88 BPM | HEIGHT: 62 IN | SYSTOLIC BLOOD PRESSURE: 110 MMHG

## 2020-08-31 PROCEDURE — 99395 PREV VISIT EST AGE 18-39: CPT | Performed by: OBSTETRICS & GYNECOLOGY

## 2020-08-31 RX ORDER — SERTRALINE HYDROCHLORIDE 25 MG/1
25 TABLET, FILM COATED ORAL DAILY
Qty: 30 TABLET | Refills: 1 | Status: SHIPPED | OUTPATIENT
Start: 2020-08-31 | End: 2020-10-12 | Stop reason: DRUGHIGH

## 2020-08-31 ASSESSMENT — ENCOUNTER SYMPTOMS
BACK PAIN: 0
COUGH: 0
ABDOMINAL PAIN: 0
SHORTNESS OF BREATH: 0

## 2020-08-31 NOTE — PROGRESS NOTES
Obstetric History:   3  Para 2  Gynecologic History: LMP 8/15/2020   Menarche 13  Duration 4-5 d    Interval q  27-30 d  Tampons/Pads in a day: 4-6  Last Pap: 18       Any history of abnormal paps yes    PriorColpo/Biopsy LEEP in      Last Mammogram n/a   Contraception: none  Complications: none  STDs: none  Psychosocial History: Occupation:   [employer]   Caffeine Yes    At risk for depression Yes    Abuse:   No  Seatbelt:   Yes  Exercise:  No    Social History     Socioeconomic History    Marital status:      Spouse name: Not on file    Number of children: Not on file    Years of education: Not on file    Highest education level: Not on file   Occupational History    Not on file   Social Needs    Financial resource strain: Not on file    Food insecurity     Worry: Not on file     Inability: Not on file    Transportation needs     Medical: Not on file     Non-medical: Not on file   Tobacco Use    Smoking status: Never Smoker    Smokeless tobacco: Never Used   Substance and Sexual Activity    Alcohol use: Yes     Comment: occ    Drug use: No    Sexual activity: Yes     Partners: Male   Lifestyle    Physical activity     Days per week: Not on file     Minutes per session: Not on file    Stress: Not on file   Relationships    Social connections     Talks on phone: Not on file     Gets together: Not on file     Attends Caodaism service: Not on file     Active member of club or organization: Not on file     Attends meetings of clubs or organizations: Not on file     Relationship status: Not on file    Intimate partner violence     Fear of current or ex partner: Not on file     Emotionally abused: Not on file     Physically abused: Not on file     Forced sexual activity: Not on file   Other Topics Concern    Not on file   Social History Narrative    Not on file       No family history on file.     Review of Systems:   Review of Systems   Constitutional: Negative for chills and fever.   HENT: Negative for congestion. Respiratory: Negative for cough and shortness of breath. Cardiovascular: Negative for chest pain and palpitations. Gastrointestinal: Negative for abdominal pain. Genitourinary: Negative for dyspareunia and vaginal discharge. Musculoskeletal: Negative for back pain. Neurological: Negative for dizziness and light-headedness. Psychiatric/Behavioral: The patient is not nervous/anxious. Physical exam:  vitals:  Height   5  ft    2 in,  Weight    120 lbs,   110/74 BP  Gen: alert, no apparent distress  HEENT:No pathologic skin lesions noted,NC/AT,PERRL, normal midline nontender thyroid   Lung Exam: Clear to auscultation in all fields bilaterally, without wheezes,rales or rhonchi. Cardiac Exam: Normal sinus rhythm andrate, without murmurs, rubs or gallops appreciated. Breast Exam: Symmetric without pathological skin changes, nontender without discrete suspicious masses palpated, supraclavicular or axillary adenopathy or nipple discharge noted. Abdominal Exam: Nontender to deep palpation without organomegaly, masses or CVAT appreciated, BS positive. No spinal deformation or tenderness. External Genitalia: Normal development without vulvar,vaginal or cervical lesions noted. Normal vaginal discharge, uterus anterior, 4-6 weeks without CMT. Adnexa nontender without abnormal masses bilaterally. Rectal Exam: Omitted. Extremities: Nontender without clubbing, cyanosis or edema. F.R.O.M. Neurologic Exam: Grossly intact without noted sensorimotor deficits and oriented x 3. Assessment/Plan:   Unremarkable annual Gyn exam.    Cervical Cytology Evaluation begins at 24years old. If Negative Cytology, Follow-up screening per current guidelines. Mammograms every 1year. If 35 yo and last mammogram was negative. Colonoscopy screening reviewed as well as onset for bone density testing.   Depression - will start zoloft  Birth control and barrier recommendations discussed. STD counseling and prevention reviewed. Routine health maintenance per patients PCP.   Pt to follow up for medication check in 2 months    Edwige Lopez MD  [address]

## 2020-09-02 LAB
HPV SAMPLE: NORMAL
HPV, GENOTYPE 16: NOT DETECTED
HPV, GENOTYPE 18: NOT DETECTED
HPV, HIGH RISK OTHER: NOT DETECTED
HPV, INTERPRETATION: NORMAL
SPECIMEN DESCRIPTION: NORMAL

## 2020-09-04 LAB — CYTOLOGY REPORT: NORMAL

## 2020-10-12 ENCOUNTER — PROCEDURE VISIT (OUTPATIENT)
Dept: OBGYN CLINIC | Age: 35
End: 2020-10-12
Payer: COMMERCIAL

## 2020-10-12 ENCOUNTER — HOSPITAL ENCOUNTER (OUTPATIENT)
Age: 35
Setting detail: SPECIMEN
Discharge: HOME | End: 2020-10-12
Payer: COMMERCIAL

## 2020-10-12 VITALS
WEIGHT: 120 LBS | HEART RATE: 89 BPM | DIASTOLIC BLOOD PRESSURE: 71 MMHG | HEIGHT: 61 IN | SYSTOLIC BLOOD PRESSURE: 124 MMHG | BODY MASS INDEX: 22.66 KG/M2

## 2020-10-12 PROBLEM — R87.619 ATYPICAL GLANDULAR CELLS ON CERVICAL PAP SMEAR: Status: ACTIVE | Noted: 2020-10-12

## 2020-10-12 PROCEDURE — 58100 BIOPSY OF UTERUS LINING: CPT | Performed by: OBSTETRICS & GYNECOLOGY

## 2020-10-12 NOTE — PROGRESS NOTES
on file     Relationship status: Not on file    Intimate partner violence     Fear of current or ex partner: Not on file     Emotionally abused: Not on file     Physically abused: Not on file     Forced sexual activity: Not on file   Other Topics Concern    Not on file   Social History Narrative    Not on file       Current Outpatient Medications on File Prior to Visit   Medication Sig Dispense Refill    acetaminophen (TYLENOL) 500 MG tablet Take 2 tablets by mouth every 6 hours as needed for Pain 40 tablet 3    ibuprofen (ADVIL;MOTRIN) 800 MG tablet Take 1 tablet by mouth every 8 hours as needed for Pain 40 tablet 0    Prenatal Vit w/Nh-Linsegekb-JJ (PNV PO) Take by mouth      famotidine (PEPCID) 20 MG tablet Take 1 tablet by mouth 2 times daily 60 tablet 3     No current facility-administered medications on file prior to visit. Allergies as of 10/12/2020 - Review Complete 10/12/2020   Allergen Reaction Noted    Bactrim [sulfamethoxazole-trimethoprim] Hives 09/08/2017           INDICATIONS:   1. Atypical glandular cells on cervical Pap smear                   UHCG: negative         HPV:   negative      Birth Control Method: natural family planning  Abnormal Cytology and Colposcopy History: LEEP in 2007    COLPOSCOPIC EXAMINATION:                Blood pressure 124/71, pulse 89, height 5' 1\" (1.549 m), weight 120 lb (54.4 kg), last menstrual period 09/19/2020, not currently breastfeeding. Gross observations: negative  Satisfactory: Yes   Unsatisfactory: No    Physical Exam  Genitourinary:                   ECC performed:  Yes    EMB performed:    Yes       Lugols Iodine Applied:   No       IMPRESSIONS: normal SEAMUS 1  Biopsy sites: 5 o'clock    The patient was positioned comfortably on the exam table. After a bi-manual exam; the uterus was found to be  anteverted with a size of 7 cm. There was no adnexal masses and the bladder was smooth, non-tender and without palpable masses.  A sterile speculum was placed into the vagina and the cervix was identified. It was not stabilized with an allis clamp. It was cleansed with betadine and the aspirator was then gently passed into the endometrial cavity. Tissue was obtained and sent to pathology. The patient tolerated the procedure well. Post procedure restrictions were reviewed and given to the patient. .  All counts and instruments were correct at the end of the procedure. Assessment:   Diagnosis Orders   1. Atypical glandular cells on cervical Pap smear  Colposcopy    Surgical Pathology    Surgical Pathology    Surgical Pathology    OK BIOPSY OF UTERUS LINING         PLAN:   1. The patient was given formal restriction guidelines. She is instructed to report any increase in vaginal bleeding, discharge, or any temperature more than 100.4   F. Strict pelvic rest precautions were reviewed with lifting restrictions. Will call pt with results of colposcopy.          Kennedy Casanova MD

## 2020-10-15 LAB — SURGICAL PATHOLOGY REPORT: NORMAL

## 2021-06-01 ENCOUNTER — TELEPHONE (OUTPATIENT)
Dept: OBGYN CLINIC | Age: 36
End: 2021-06-01

## 2021-06-01 ENCOUNTER — HOSPITAL ENCOUNTER (OUTPATIENT)
Facility: CLINIC | Age: 36
Discharge: HOME | End: 2021-06-01
Payer: COMMERCIAL

## 2021-06-01 DIAGNOSIS — N92.6 MISSED MENSES: ICD-10-CM

## 2021-06-01 DIAGNOSIS — N92.6 MISSED MENSES: Primary | ICD-10-CM

## 2021-06-01 LAB — HCG QUANTITATIVE: ABNORMAL IU/L

## 2021-06-01 PROCEDURE — 84702 CHORIONIC GONADOTROPIN TEST: CPT

## 2021-06-01 PROCEDURE — 36415 COLL VENOUS BLD VENIPUNCTURE: CPT

## 2021-06-01 NOTE — TELEPHONE ENCOUNTER
Pt called yesterday she had some small amount of bleeding and cramping and today she does not have the bleeding but the cramping is more intense she is in early pregnancy she is wondering what you recommend she has an appt 06/07

## 2021-06-01 NOTE — TELEPHONE ENCOUNTER
Please let her know this is common in early pregnancy, and we can just watch things at this time. Please have her do hcg labs 48 hours apart. Those will help us know if the pregnancy is progressing as it should. Please order them and tell her they are in the computer so she can just go to any [employer] [facility] [employer] lab today and then again on Thursday.   Thanks

## 2021-06-03 ENCOUNTER — HOSPITAL ENCOUNTER (OUTPATIENT)
Facility: CLINIC | Age: 36
Discharge: HOME | End: 2021-06-03
Payer: COMMERCIAL

## 2021-06-03 DIAGNOSIS — N92.6 MISSED MENSES: ICD-10-CM

## 2021-06-03 LAB — HCG QUANTITATIVE: ABNORMAL IU/L

## 2021-06-03 PROCEDURE — 84702 CHORIONIC GONADOTROPIN TEST: CPT

## 2021-06-03 PROCEDURE — 36415 COLL VENOUS BLD VENIPUNCTURE: CPT

## 2021-06-06 NOTE — PROGRESS NOTES
[facility] [place] [facility]  [facility] - [address] [facility]  Dept: [phone]  21    Chief Complaint   Patient presents with    Amenorrhea     LMP 21 8w3d by LMP       Rubén Fan is a  who presents for initial confirmation of pregnancy. This is a 3rd pregnancy with her  Yeni Baker. They had a miscarriage 1 year ago. She denies any history of CHTN, DM, asthma. She has had chicken pox, or the Varicella vaccine in the past.      Planned/unplanned:  If it happened it happened  ANDREW:  Estimated Date of Delivery: None noted. 3O8C  COMPLICATIONS CONCERNS: AMA, h/o GDMA1, h/o PreE  PRENATAL HISTORY:  H/o GDM (G2), h/o PreE (G2), h/o PPH (G2)    Blood pressure 107/69, pulse 77, height 5' 2\" (1.575 m), weight 129 lb 8 oz (58.7 kg), last menstrual period 2021, not currently breastfeeding.   Past Medical History:   Diagnosis Date    Abnormal Pap smear     HGSIL    Adopted     Asthma     Tachycardia      Past Surgical History:   Procedure Laterality Date    DENTAL SURGERY      LAPAROSCOPY  2019    diagnostic, with open hernia ventral repair and mesh by Dr. Deon PHOENIX  2007    HGSIL    VENTRAL HERNIA REPAIR N/A 2019    DIAGNOSTIC LAPAROSCOPY WITH OPEN HERNIA VENTRAL REPAIR & MESH performed by Fish Mehta MD at [address] [place] [address] History     Socioeconomic History    Marital status:      Spouse name: Not on file    Number of children: Not on file    Years of education: Not on file    Highest education level: Not on file   Occupational History    Not on file   Tobacco Use    Smoking status: Never Smoker    Smokeless tobacco: Never Used   Substance and Sexual Activity    Alcohol use: Yes     Comment: occ    Drug use: No    Sexual activity: Yes     Partners: Male   Other Topics Concern    Not on file   Social History Narrative    Not on file     Social Determinants of Health     Financial Resource Strain:     Difficulty of Paying Living Expenses:    Food Insecurity:     Worried About Running Out of Food in the Last Year:     [address] in the Last Year:    Transportation Needs:     Lack of Transportation (Medical):  Lack of Transportation (Non-Medical):    Physical Activity:     Days of Exercise per Week:     Minutes of Exercise per Session:    Stress:     Feeling of Stress :    Social Connections:     Frequency of Communication with Friends and Family:     Frequency of Social Gatherings with Friends and Family:     Attends Muslim Services:     Active Member of Clubs or Organizations:     Attends Club or Organization Meetings:     Marital Status:    Intimate Partner Violence:     Fear of Current or Ex-Partner:     Emotionally Abused:     Physically Abused:     Sexually Abused: Allergies   Allergen Reactions    Bactrim [Sulfamethoxazole-Trimethoprim] Hives     History reviewed. No pertinent family history. ROS:  Constitutional:  Denies fever or chills, fatigue  Eyes:  Denies change in visual acuity, blurred vision, itching  HENT:  Denies nasal congestion or sore throat   Respiratory:  Denies cough or shortness of breath, difficulty breathing  Cardiovascular:  Denies chest pain or edema  GI:  Denies abdominal pain, nausea, vomiting, bloody stools or diarrhea   :  Denies dysuria, frequency, urgency  Musculoskeletal:  Denies back pain or joint pain   Integument:  Denies rash, itching, dryness  Neurologic:  Denies headache, focal weakness or sensory changes   Endocrine:  Denies polyuria or polydipsia,    Lymphatic:  Denies swollen glands,   Psychiatric:  Denies depression or anxiety       Physical findings: HEENT - Perrla, Eomi  Neck- Supple, no bruits  Lungs - Clear to auscultation.   CV- Regular rate and rythym,   Abdomen - Non tender, non distended, no masses  Extremities - no weakness, no calf pain, no edema, no posterior tibial pain  Pelvis - no bleeding, no discharge, no CMT      Assessment/Plan:  Patient Active Problem List   Diagnosis    Supervision of other normal pregnancy, antepartum    Low implantation of placenta without hemorrhage    Suspected placental problem not found    Elevated blood pressure complicating pregnancy, antepartum, third trimester    Rh+/RI/GBS neg    High risk pregnancy, antepartum     3/16/18 F APB 8/9 Wt 6#13    Postpartum uterine atony without hemorrhage    Atypical glandular cells on cervical Pap smear    History of pre-eclampsia in prior pregnancy, currently pregnant    History of gestational diabetes mellitus (GDM)        Diagnosis Orders   1. Amenorrhea  US OB LESS THAN 14 WEEKS SINGLE OR FIRST GESTATION    US OB TRANSVAGINAL    Urinalysis    Culture, Urine    Urine Drug Screen    VAGINITIS DNA PROBE    PAP SMEAR    HIV Screen    C.trachomatis N.gonorrhoeae DNA    Prenatal Profile 1   2. Missed menses  US OB LESS THAN 14 WEEKS SINGLE OR FIRST GESTATION    US OB TRANSVAGINAL    Urinalysis    Culture, Urine    Urine Drug Screen    VAGINITIS DNA PROBE    PAP SMEAR    HIV Screen    C.trachomatis N.gonorrhoeae DNA    Prenatal Profile 1   3. History of gestational diabetes mellitus (GDM)  Glucose tolerance screen 50g   4. History of pre-eclampsia in prior pregnancy, currently pregnant  Comprehensive Metabolic Panel    Protein / Creatinine Ratio, Urine       H/o GDMA1 - early 1 hr GTT given  H/o PreE - baseline labs given, discussed baby ASA @ 12 wks  Desires NIPT    Return in about 4 weeks (around 2021) for Ob history.     Elizabeth Gibson MD

## 2021-06-07 ENCOUNTER — OFFICE VISIT (OUTPATIENT)
Dept: OBGYN CLINIC | Age: 36
End: 2021-06-07
Payer: COMMERCIAL

## 2021-06-07 ENCOUNTER — HOSPITAL ENCOUNTER (OUTPATIENT)
Dept: ULTRASOUND IMAGING | Facility: CLINIC | Age: 36
Discharge: HOME | End: 2021-06-09
Payer: COMMERCIAL

## 2021-06-07 ENCOUNTER — APPOINTMENT (OUTPATIENT)
Dept: ULTRASOUND IMAGING | Facility: CLINIC | Age: 36
End: 2021-06-07
Payer: COMMERCIAL

## 2021-06-07 ENCOUNTER — HOSPITAL ENCOUNTER (OUTPATIENT)
Age: 36
Setting detail: SPECIMEN
Discharge: HOME | End: 2021-06-07
Payer: COMMERCIAL

## 2021-06-07 VITALS
DIASTOLIC BLOOD PRESSURE: 69 MMHG | HEIGHT: 62 IN | BODY MASS INDEX: 23.83 KG/M2 | HEART RATE: 77 BPM | WEIGHT: 129.5 LBS | SYSTOLIC BLOOD PRESSURE: 107 MMHG

## 2021-06-07 DIAGNOSIS — N92.6 MISSED MENSES: ICD-10-CM

## 2021-06-07 DIAGNOSIS — Z86.32 HISTORY OF GESTATIONAL DIABETES MELLITUS (GDM): ICD-10-CM

## 2021-06-07 DIAGNOSIS — N91.2 AMENORRHEA: ICD-10-CM

## 2021-06-07 DIAGNOSIS — O09.299 HISTORY OF PRE-ECLAMPSIA IN PRIOR PREGNANCY, CURRENTLY PREGNANT: ICD-10-CM

## 2021-06-07 DIAGNOSIS — N91.2 AMENORRHEA: Primary | ICD-10-CM

## 2021-06-07 PROBLEM — O14.90 PREECLAMPSIA: Status: RESOLVED | Noted: 2018-03-19 | Resolved: 2021-06-07

## 2021-06-07 PROBLEM — O24.410 DIET CONTROLLED GESTATIONAL DIABETES MELLITUS (GDM) IN THIRD TRIMESTER: Status: RESOLVED | Noted: 2017-11-20 | Resolved: 2021-06-07

## 2021-06-07 PROCEDURE — 76801 OB US < 14 WKS SINGLE FETUS: CPT

## 2021-06-07 PROCEDURE — 99213 OFFICE O/P EST LOW 20 MIN: CPT | Performed by: OBSTETRICS & GYNECOLOGY

## 2021-06-07 PROCEDURE — 76817 TRANSVAGINAL US OBSTETRIC: CPT

## 2021-06-08 DIAGNOSIS — N92.6 MISSED MENSES: ICD-10-CM

## 2021-06-08 DIAGNOSIS — O09.299 HISTORY OF PRE-ECLAMPSIA IN PRIOR PREGNANCY, CURRENTLY PREGNANT: ICD-10-CM

## 2021-06-08 DIAGNOSIS — N91.2 AMENORRHEA: ICD-10-CM

## 2021-06-08 LAB
-: ABNORMAL
AMORPHOUS: ABNORMAL
AMPHETAMINE SCREEN URINE: NEGATIVE
BACTERIA: ABNORMAL
BARBITURATE SCREEN URINE: NEGATIVE
BENZODIAZEPINE SCREEN, URINE: NEGATIVE
BILIRUBIN URINE: NEGATIVE
BUPRENORPHINE URINE: NORMAL
CANNABINOID SCREEN URINE: NEGATIVE
CASTS UA: ABNORMAL /LPF (ref 0–8)
COCAINE METABOLITE, URINE: NEGATIVE
COLOR: YELLOW
COMMENT UA: ABNORMAL
CREATININE URINE: 142.5 MG/DL (ref 28–217)
CRYSTALS, UA: ABNORMAL /HPF
CULTURE: NORMAL
CULTURE: NORMAL
DIRECT EXAM: NORMAL
EPITHELIAL CELLS UA: ABNORMAL /HPF (ref 0–5)
GLUCOSE URINE: NEGATIVE
KETONES, URINE: NEGATIVE
LEUKOCYTE ESTERASE, URINE: ABNORMAL
Lab: NORMAL
Lab: NORMAL
MDMA URINE: NORMAL
METHADONE SCREEN, URINE: NEGATIVE
METHAMPHETAMINE, URINE: NORMAL
MUCUS: ABNORMAL
NITRITE, URINE: NEGATIVE
OPIATES, URINE: NEGATIVE
OTHER OBSERVATIONS UA: ABNORMAL
OXYCODONE SCREEN URINE: NEGATIVE
PH UA: 7 (ref 5–8)
PHENCYCLIDINE, URINE: NEGATIVE
PROPOXYPHENE, URINE: NORMAL
PROTEIN UA: NEGATIVE
RBC UA: ABNORMAL /HPF (ref 0–4)
RENAL EPITHELIAL, UA: ABNORMAL /HPF
SPECIFIC GRAVITY UA: 1.02 (ref 1–1.03)
SPECIMEN DESCRIPTION: NORMAL
SPECIMEN DESCRIPTION: NORMAL
TEST INFORMATION: NORMAL
TOTAL PROTEIN, URINE: 9 MG/DL
TRICHOMONAS: ABNORMAL
TRICYCLIC ANTIDEPRESSANTS, UR: NORMAL
TURBIDITY: ABNORMAL
URINE HGB: ABNORMAL
URINE TOTAL PROTEIN CREATININE RATIO: 0.06 (ref 0–0.2)
UROBILINOGEN, URINE: NORMAL
WBC UA: ABNORMAL /HPF (ref 0–5)
YEAST: ABNORMAL

## 2021-06-09 LAB
C TRACH DNA GENITAL QL NAA+PROBE: NEGATIVE
HPV SAMPLE: NORMAL
HPV, GENOTYPE 16: NOT DETECTED
HPV, GENOTYPE 18: NOT DETECTED
HPV, HIGH RISK OTHER: NOT DETECTED
HPV, INTERPRETATION: NORMAL
N. GONORRHOEAE DNA: NEGATIVE
SPECIMEN DESCRIPTION: NORMAL
SPECIMEN DESCRIPTION: NORMAL

## 2021-06-10 LAB — CYTOLOGY REPORT: NORMAL

## 2021-06-11 LAB
CULTURE: NORMAL
Lab: NORMAL
SPECIMEN DESCRIPTION: NORMAL

## 2021-07-09 ENCOUNTER — INITIAL PRENATAL (OUTPATIENT)
Dept: OBGYN CLINIC | Age: 36
End: 2021-07-09

## 2021-07-09 ENCOUNTER — HOSPITAL ENCOUNTER (OUTPATIENT)
Age: 36
Setting detail: SPECIMEN
Discharge: HOME | End: 2021-07-09
Payer: COMMERCIAL

## 2021-07-09 VITALS
SYSTOLIC BLOOD PRESSURE: 120 MMHG | HEART RATE: 92 BPM | DIASTOLIC BLOOD PRESSURE: 72 MMHG | BODY MASS INDEX: 23.59 KG/M2 | WEIGHT: 129 LBS

## 2021-07-09 DIAGNOSIS — N91.2 AMENORRHEA: ICD-10-CM

## 2021-07-09 DIAGNOSIS — O09.891 MEDICATION EXPOSURE DURING FIRST TRIMESTER OF PREGNANCY: ICD-10-CM

## 2021-07-09 DIAGNOSIS — O09.299 HISTORY OF PRE-ECLAMPSIA IN PRIOR PREGNANCY, CURRENTLY PREGNANT: ICD-10-CM

## 2021-07-09 DIAGNOSIS — N92.6 MISSED MENSES: ICD-10-CM

## 2021-07-09 LAB
ABO/RH: NORMAL
ABSOLUTE EOS #: 0.11 K/UL (ref 0–0.44)
ABSOLUTE IMMATURE GRANULOCYTE: 0.04 K/UL (ref 0–0.3)
ABSOLUTE LYMPH #: 1.96 K/UL (ref 1.1–3.7)
ABSOLUTE MONO #: 0.76 K/UL (ref 0.1–1.2)
ALBUMIN SERPL-MCNC: 4 G/DL (ref 3.5–5.2)
ALBUMIN/GLOBULIN RATIO: 1.3 (ref 1–2.5)
ALP BLD-CCNC: 51 U/L (ref 35–104)
ALT SERPL-CCNC: 11 U/L (ref 5–33)
ANION GAP SERPL CALCULATED.3IONS-SCNC: 11 MMOL/L (ref 9–17)
ANTIBODY SCREEN: NEGATIVE
AST SERPL-CCNC: 13 U/L
BASOPHILS # BLD: 0 % (ref 0–2)
BASOPHILS ABSOLUTE: <0.03 K/UL (ref 0–0.2)
BILIRUB SERPL-MCNC: 0.48 MG/DL (ref 0.3–1.2)
BUN BLDV-MCNC: 10 MG/DL (ref 6–20)
BUN/CREAT BLD: ABNORMAL (ref 9–20)
CALCIUM SERPL-MCNC: 9.2 MG/DL (ref 8.6–10.4)
CHLORIDE BLD-SCNC: 103 MMOL/L (ref 98–107)
CO2: 22 MMOL/L (ref 20–31)
CREAT SERPL-MCNC: 0.37 MG/DL (ref 0.5–0.9)
DIFFERENTIAL TYPE: ABNORMAL
EOSINOPHILS RELATIVE PERCENT: 1 % (ref 1–4)
GFR AFRICAN AMERICAN: >60 ML/MIN
GFR NON-AFRICAN AMERICAN: >60 ML/MIN
GFR SERPL CREATININE-BSD FRML MDRD: ABNORMAL ML/MIN/{1.73_M2}
GFR SERPL CREATININE-BSD FRML MDRD: ABNORMAL ML/MIN/{1.73_M2}
GLUCOSE BLD-MCNC: 83 MG/DL (ref 70–99)
HCT VFR BLD CALC: 38.5 % (ref 36.3–47.1)
HEMOGLOBIN: 13.2 G/DL (ref 11.9–15.1)
HEPATITIS B SURFACE ANTIGEN: NONREACTIVE
HIV AG/AB: NONREACTIVE
IMMATURE GRANULOCYTES: 0 %
LYMPHOCYTES # BLD: 19 % (ref 24–43)
MCH RBC QN AUTO: 33.2 PG (ref 25.2–33.5)
MCHC RBC AUTO-ENTMCNC: 34.3 G/DL (ref 28.4–34.8)
MCV RBC AUTO: 97 FL (ref 82.6–102.9)
MONOCYTES # BLD: 8 % (ref 3–12)
NRBC AUTOMATED: 0 PER 100 WBC
PDW BLD-RTO: 11.6 % (ref 11.8–14.4)
PLATELET # BLD: 225 K/UL (ref 138–453)
PLATELET ESTIMATE: ABNORMAL
PMV BLD AUTO: 10.8 FL (ref 8.1–13.5)
POTASSIUM SERPL-SCNC: 3.7 MMOL/L (ref 3.7–5.3)
RBC # BLD: 3.97 M/UL (ref 3.95–5.11)
RBC # BLD: ABNORMAL 10*6/UL
RUBV IGG SER QL: 11.6 IU/ML
SEG NEUTROPHILS: 72 % (ref 36–65)
SEGMENTED NEUTROPHILS ABSOLUTE COUNT: 7.3 K/UL (ref 1.5–8.1)
SODIUM BLD-SCNC: 136 MMOL/L (ref 135–144)
T. PALLIDUM, IGG: NONREACTIVE
TOTAL PROTEIN: 7.2 G/DL (ref 6.4–8.3)
WBC # BLD: 10.2 K/UL (ref 3.5–11.3)
WBC # BLD: ABNORMAL 10*3/UL

## 2021-07-09 PROCEDURE — 0501F PRENATAL FLOW SHEET: CPT | Performed by: OBSTETRICS & GYNECOLOGY

## 2021-07-09 NOTE — PROGRESS NOTES
[facility] PHYSICIANS  [facility] - [address] [facility]  Dept: [phone]  21      Satya Mancilla is a 30 y.o. [id number] @ 11w3d, here for her ACOG. The patients past medical, surgical, social and family history were reviewed. Current medications and allergies were reviewed, and documented in the chart. 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 Menstrual history: regular periods  Birth control: vasectomy    Wt Readings from Last 3 Encounters:   21 129 lb (58.5 kg)   21 129 lb 8 oz (58.7 kg)   10/12/20 120 lb (54.4 kg)     Recent Results (from the past 8736 hour(s))   GYN Cytology    Collection Time: 20 10:09 AM   Result Value Ref Range    Cytology Report       INTERPRETATION    Cervical material, (ThinPrep vial, Imaging-assisted review):  Specimen Adequacy:       Satisfactory for evaluation.       - Endocervical/transformation zone component present. - Partially obscuring blood. Descriptive Diagnosis:       Atypical glandular cells present (not otherwise specified). Comments:       High Risk HPV testing was ordered.       Cytotechnologist:   Carmenza Nicolas M.D.  **Electronically Signed Out** gino/9/4/2020          Procedure/Addendum  HPV Procedure Report     Date Ordered:     9/1/2020     Status: Signed  Out       Date Complete:     9/1/2020     By: Joselyn BROWN(ASCP)       Date Reported:     9/8/2020       INTERPRETATION  Roche HPV DNA High Risk                                  HPV Sample               Thin Prep                    (Ref Range)  HPV Type 16               Not Detected                    (Not  Detected)  HPV Type 18               Not Detected                    (Not  Detected)  Other High Risk HPV     Not De tected                    (Not Detected)       This test amplifies and detects DNA of 14 high-risk HPV types  associated with cervical cancer and its precursor lesions (HPV types  16, 18, 31, 33, 35, 39, 45, 51, 52, 56, 58, 59, 66, and 68). Sensitivity may be affected by specimen collection methods, stage of  infection, and the presence of interfering substances. Results should  be interpreted in conjunction with other available laboratory and  clinical data. A negative high-risk HPV result does not exclude the  possibility of future cytologic HSIL or underlying CIN2-3 or cancer. This test is intended for medical purposes only and is not valid for  the evaluation of suspected sexual abuse or for other forensic  purposes. Source:  1: Cervical material, (ThinPrep vial, Imaging-assisted review)    Clinical History  Z01.419 Routine gyn exam without abnormal findings: `  Co-Test:  ThinPrep Pap with high risk HPV testing    GYNECOLOGIC CYTOLOGY REPORT    Patient Name: Becky Diss. Med Rec: [id number]  Path Number: [id number]  [address] [place] [address] [place], [address]  [phone]  Fax: [phone]     Human papillomavirus (HPV) DNA probe thin prep high risk    Collection Time: 08/31/20  8:00 PM   Result Value Ref Range    Specimen Description . CERVIX     HPV Sample . THIN PREP     HPV, Genotype 16 Not Detected Not Detected    HPV, Genotype 18 Not Detected Not Detected    HPV, High Risk Other Not Detected Not Detected    HPV, Interpretation         Surgical Pathology    Collection Time: 10/12/20  1:21 PM   Result Value Ref Range    Surgical Pathology Report       -- Diagnosis --  1. ENDOCERVIX, CURETTAGE:- BENIGN ENDOCERVICAL TISSUE WITH MILD  CHRONIC INFLAMMATION. - SCANT BENIGN SQUAMOUS EPITHELIAL CELLS.- SMALL  FRAGMENT OF BENIGN, EARLY SECRETORY PHASE ENDOMETRIUM. - NEGATIVE FOR  MANDA AND NEOPLASIA. 2.  CERVIX, 5:00, BIOPSY:- LOW-GRADE SQUAMOUS INTRAEPITHELIAL LESION  (SEAMUS-1). - ACUTE AND CHRONIC CERVICITIS. 3.  ENDOMETRIUM, BIOPSY:- EARLY SECRETORY PHASE ENDOMETRIUM  (APPROXIMATELY POD 3). Elma Gibson. Sensulin,  **Electronically Signed Out**         sls/10/14/2020       Clinical Information  Pre-op Diagnosis:  ATYPICAL GLANDULAR CELLS ON CERVICAL PAP SMEAR    Operative Findings:  ECC; 5:00; ENDO BX    Source of Specimen  1: ECC  2: 5 O'CLOCK  3: ENDO BX    Gross Description  1. \"MABEL BOMIA, ECC\" Mucinous fragments, 2.8 x 2.0 x 0.1 cm in  aggregate. Entirely 1cs. 2. \"MABEL BOMIA, 5\" Mucinous fragments, 2.6 x 0.6 x 0.1 cm in  aggregate. Entirely 1cs. 3. \"MABEL BOMIA, ENDO BX\" [place]-white fragments, 2.5 x 1.4 x 0.2 cm in  aggregate. Entirely 1cs. rh tm      Microscopic Description  1-3. Microscopic examination performed. SURGICAL PATHOLOGY CONSULTATION       Patient Name: Danielito Nichols Med Rec: [id number]  Path Number: [id number]    [address] [place] [address].   [place], [address]  [phone]  Fax: [phone]     hCG, Quantitative, Pregnancy    Collection Time: 06/01/21 10:04 AM   Result Value Ref Range    hCG Quant 52,087 (H) <5 IU/L   hCG, Quantitative, Pregnancy    Collection Time: 06/03/21 10:37 AM   Result Value Ref Range    hCG Quant 26,000 (H) <5 IU/L   Culture, Urine    Collection Time: 06/07/21  7:16 AM    Specimen: Urine, clean catch   Result Value Ref Range    Specimen Description . CLEAN CATCH URINE     Special Requests NOT REPORTED     Culture NO SIGNIFICANT GROWTH    Urine Drug Screen    Collection Time: 06/07/21  7:16 AM   Result Value Ref Range    Amphetamine Screen, Ur NEGATIVE NEGATIVE    Barbiturate Screen, Ur NEGATIVE NEGATIVE    Benzodiazepine Screen, Urine NEGATIVE NEGATIVE    Cocaine Metabolite, Urine NEGATIVE NEGATIVE    Methadone Screen, Urine NEGATIVE NEGATIVE    Opiates, Urine NEGATIVE NEGATIVE    Phencyclidine, Urine NEGATIVE NEGATIVE    Propoxyphene, Urine NOT REPORTED NEGATIVE    Cannabinoid Scrn, Ur NEGATIVE NEGATIVE    Oxycodone Screen, Ur NEGATIVE NEGATIVE    Methamphetamine, Urine NOT REPORTED NEGATIVE    Tricyclic Antidepressants, Urine NOT REPORTED NEGATIVE    MDMA, Urine NOT REPORTED NEGATIVE    Buprenorphine Urine NOT REPORTED NEGATIVE    Test Information       Assay provides medical screening only. The absence of expected drug(s) and/or metabolite(s) may indicate diluted or adulterated urine, limitations of testing or timing of collection. C.trachomatis N.gonorrhoeae DNA    Collection Time: 06/07/21  7:16 AM    Specimen: Cervix   Result Value Ref Range    Specimen Description . CERVIX     C. trachomatis DNA NEGATIVE NEGATIVE    N. gonorrhoeae DNA NEGATIVE NEGATIVE   Urinalysis    Collection Time: 06/07/21  7:16 AM   Result Value Ref Range    Color, UA YELLOW YELLOW    Turbidity UA CLOUDY (A) CLEAR    Glucose, Ur NEGATIVE NEGATIVE    Bilirubin Urine NEGATIVE NEGATIVE    Ketones, Urine NEGATIVE NEGATIVE    Specific Gravity, UA 1.022 1.005 - 1.030    Urine Hgb MODERATE (A) NEGATIVE    pH, UA 7.0 5.0 - 8.0    Protein, UA NEGATIVE NEGATIVE    Urobilinogen, Urine Normal Normal    Nitrite, Urine NEGATIVE NEGATIVE    Leukocyte Esterase, Urine LARGE (A) NEGATIVE    Urinalysis Comments NOT REPORTED    Protein / creatinine ratio, urine    Collection Time: 06/07/21  7:16 AM 6/8/2021     Status: Signed  Out       Date Complete:     6/8/2021     By: KEVIN Noonan(ASCP)       Date Reported:     6/11/2021       INTERPRETATION  Roche HPV DNA High Risk                                  HPV Sample               Thin Prep                    (Ref Range)  HPV Type 16               Not Detected                    (Not  Detected)  HPV Type 18               Not Detected                    (Not  Detected)  Other High Risk HPV          Not Detected                    (Not  Detected)        This test amplifies and detects DNA of 14 high-risk HPV types  associated with cervical cancer and its precursor lesions (HPV types  16, 18, 31, 33, 35, 39, 45, 51, 52, 56, 58, 59, 66, and 68). Sensitivity may be affected by specimen collection methods, stage of  infection, and the presence of interfering substances. Results should  be interpreted in conjunction with other available laboratory and  clinical data. A negative high-risk HPV result does not exclude the  possibility of future cytologic HSIL or underlying CIN2-3 or cancer. This test is intended for medical purposes only and is not valid for  the evaluation of suspected sexual abuse or for other forensic  purposes. Source:  1: Cervical material, (ThinPrep vial, Imaging-assisted review)    Clinical History  Amenorrhea: N91.2  Co-Test:  ThinPrep Pap with high risk HPV testing    GYNECOLOGIC CYTOLOGY REPORT    Patient Name: Deepa Mclain [facility] Rec: [id number]  Path Number: [id number]  [facility] [place] PATHOLOGISTS [facility]  ANATOMIC PATHOLOGY  [address] [place] [address]. [place], [address]  [phone]  Fax: [phone]     Human papillomavirus (HPV) DNA probe thin prep high risk    Collection Time: 06/07/21  8:00 PM   Result Value Ref Range    Specimen Description . CERVIX     HPV Sample . THIN PREP     HPV, Genotype 16 Not Detected Not Detected    HPV, Genotype 18 Not Detected Not Detected    HPV, High Risk Other Not Detected Not Detected    HPV, Interpretation         Culture, Urine    Collection Time: 21  1:30 PM    Specimen: Urine, clean catch   Result Value Ref Range    Specimen Description . CLEAN CATCH URINE     Special Requests NOT REPORTED     Culture CANCELLED BY LAB     Culture DUPLICATE ORDER        Past Medical History:   Diagnosis Date    Abnormal Pap smear     HGSIL    Adopted     Asthma     Tachycardia                                                                    Past Surgical History:   Procedure Laterality Date    DENTAL SURGERY      LAPAROSCOPY  2019    diagnostic, with open hernia ventral repair and mesh by Dr. Unger Adjutant ALBAN  2007    HGSIL    VENTRAL HERNIA REPAIR N/A 2019    DIAGNOSTIC LAPAROSCOPY WITH OPEN HERNIA VENTRAL REPAIR & MESH performed by Patricia Ochoa MD at [address] [place] [address]       History reviewed. No pertinent family history. Social History     Tobacco Use   Smoking Status Never Smoker   Smokeless Tobacco Never Used     Social History     Substance and Sexual Activity   Alcohol Use Yes    Comment: occ       MEDICATIONS:  Current Outpatient Medications   Medication Sig Dispense Refill    sertraline (ZOLOFT) 50 MG tablet Take 1 tablet by mouth daily 30 tablet 5    Prenatal Multivit-Min-Fe-FA (PRE-BLANQUITA PO) Take by mouth      acetaminophen (TYLENOL) 500 MG tablet Take 2 tablets by mouth every 6 hours as needed for Pain 40 tablet 3    ibuprofen (ADVIL;MOTRIN) 800 MG tablet Take 1 tablet by mouth every 8 hours as needed for Pain 40 tablet 0    Prenatal Vit w/Sc-Zdqetaabq-OE (PNV PO) Take by mouth      famotidine (PEPCID) 20 MG tablet Take 1 tablet by mouth 2 times daily 60 tablet 3     No current facility-administered medications for this visit. ALLERGIES:  Bactrim [sulfamethoxazole-trimethoprim]    Reviewed global and practice OB care including nausea measures, nutrition, activities, warning signs, and contact information.  Offered cell free DNA screen,NT echo and WIC .    `--------------------------------------------------------------------------  Genetic Screening/Teratology Counseling  (Include patient, FOB or anyone in either family)    1) Patient's age 28 years or > at ANDREW: Yes  2) Thalassemia (Mediterranean, ): No  3) Neural Tube Defect:   No  4) Congenital heart defect:   Yes, nephew had surgery to close a hole in the heart  5) Trisomy (e.g. Down Syndrome):  No  6) Austen-sachs (Gnosticism, [address]): No  7) Multiple Births:    Yes, FOB's cousins  6) Sickle cell (disease or trait):  No  9) Hemophilia or blood disorders:  No  10) Muscular Dystrophy:   No  11) Cystic Fibrosis:    No  12) Pine's chorea:   No  13) Mental retardation/Autism :  Yes, FOB's second cousin   If yes, was person tested for fragile X: No  14) Other inherited genetic/chromosomal disorder: No  15) Maternal metabolic disorder (DM, PKU): No  12) Child with birth defect not listed:  No  17) Recurrent pregnancy loss/stillbirth: No  18) Medications, supplements/illicit or   Recreational drugs/alcohol since LMP: No   List: none  19) Any other:   none  · Screening risk factors for early 1 hour glucola:  ? BMI greater than 30: No  ? First-degree relative with diabetes: No  ? Previously given birth to an infant weighing 2tow62yr (4000g) or more: No  ? History of polycystic ovarian syndrome: No    Comments/Counseling: The patient was seen full history and physical was completed/reviewed. Cytology was collected for patients over 24years of age. Cultures were collected. The patient was counseled on office policies and she was counseled on termination of pregnancy in the state of [place]. The patient was counseled on Toxoplasmosis, HIV, Tobacco Abuse, Group Beta Strep Infections, Cystic Fibrosis,  Labor precautions and Sickle Cell disease. The patient was counseled on the risks of tobacco abuse.  Both maternal and fetal. She was instructed to stop smoking if currently using tobacco. Morbidity, mortality, and cessation programs were reviewed. The risks include but are not limited to increased risks of  labor,  delivery, premature rupture of membranes, intrauterine growth restriction, intrauterine fetal demise and abruptio placenta. Secondary smoke risks were also reviewed. Increases in cancer, respiratory problems, and sudden infant death syndrome were reviewed as well. The patient was informed of a 2-4% risk of congenital anomalies in the general population. She was also informed that karyotyping is the only way to evaluate the fetus for genetic problems and genetic lethal anomalies. Chorionic villous sampling, amniocentesis and Maternal Genetic Blood Sampling-(VeriFi) were also discussed with morbidity rates in detail. She declined any of the options. Route of delivery and counseling on vaginal, operative vaginal, and  sections were completed with the risks of each to both the patient as well as her baby. The possibility of a blood transfusion was discussed as well. The patient was not opposed to receiving a transfusion if needed. Nuchal translucency/Quad Evaluation and MSAFP single marker testing was reviewed in detail with attention to timing of testing and their windows. For patients beyond the gestational age for Nuchal translucency evaluation Quad testing was recommended. Timing for the Quad test was reviewed. Benefits of the above testing was reviewed. A second trimester amniocentesis was also made available to the patient. Risks, Benefits and non-invasive alternative testing was reviewed. The literature regarding a questionable link to pitocin augmentation and induction of labor, the assistance of labor contractions and the initiation of contractions to help delivery, have been reviewed with the patient regarding the increased potential of having a  with Attention Deficit Hyperactivity Disorder and or Autism.  These two disorders and the ramifications of their impact on a child and the family caring for that child has been reviewed with the patient in detail. She was given the risks, benefits and alternatives of the use of this medication. She has agreed to its use in the delivery of her unborn child if needed at the time of delivery, Yes. The patient was questioned in detail regarding any genetic misnomer history, chromosomal abnormalities, or learning disabilities in herself, the father of the baby or their families.  SHE DENIED ANY HISTORY AS STATED ABOVE: Yes    -------------------------------------------------------------------------  Infection History:    1) Live with someone with TB/exposed to TB: No  2) Patient/partner has h/o genital herpes: No  3) Rash/viral illness since LMP:  No  4) History of STD:    No  5) Other: No  -------------------------------------------------------------------------    Desires NIPT  Encouraged pt to do all prenatal labs today  H/o GDM - early 1 hr GTT given previously  Zoloft      Pt to follow up in 4 wks for Alessio BLANTON [id number], MD  [address]

## 2021-08-09 ENCOUNTER — ROUTINE PRENATAL (OUTPATIENT)
Dept: OBGYN CLINIC | Age: 36
End: 2021-08-09

## 2021-08-09 VITALS
SYSTOLIC BLOOD PRESSURE: 125 MMHG | BODY MASS INDEX: 24.21 KG/M2 | DIASTOLIC BLOOD PRESSURE: 72 MMHG | WEIGHT: 132.38 LBS | HEART RATE: 102 BPM

## 2021-08-09 DIAGNOSIS — Z34.92 PRENATAL CARE, SECOND TRIMESTER: Primary | ICD-10-CM

## 2021-08-09 PROCEDURE — 0502F SUBSEQUENT PRENATAL CARE: CPT | Performed by: OBSTETRICS & GYNECOLOGY

## 2021-08-09 NOTE — PROGRESS NOTES
Tea Bruce is a  @ 15w6d who presents for NIRAV visit. She denies LOF, VB or Ctxs.  + FM occasionally. She denies any complaints. She denies any fevers/chills, SOB, cough, sore throat, HA, loss of taste/smell or sick contacts.       O:  Vitals:    21 1017   BP: 125/72   Pulse: 102     Gen: NAD  Abd: soft, nontender, gravid  Ext:  no edema    BP: 125/72  Weight: 132 lb 6 oz (60 kg)  Pulse: 102  Patient Position: Sitting  Fetal Heart Rate: 155    A/P:  Patient Active Problem List    Diagnosis Date Noted    Medication exposure during first trimester of pregnancy 2021     Clonazepam initally  Zoloft      History of pre-eclampsia in prior pregnancy, currently pregnant 2021     PreE labs **      History of gestational diabetes mellitus (GDM) 2021     Early 1 hr GTT **      Atypical glandular cells on cervical Pap smear 10/12/2020     EMB & [place] 10/12/20       3/16/18 F APB 8/ Wt 6#13 2018     IOL 2/2 PreEclampsia (new onset HTN and thrombocytopenia of platelets 25,704)  Patient on magnesium during induction  Uterine atony during delivery - received Hemabate and then 600mcg buccal cytotec afterwards,, EBL 600mL      Postpartum uterine atony without hemorrhage 2018     IOL 2/2 PreEclampsia (new onset HTN and thrombocytopenia of platelets 63,925)  Patient on magnesium during induction  Uterine atony during delivery - received Hemabate and then 600mcg buccal cytotec afterwards,, EBL 600mL      High risk pregnancy, antepartum 03/15/2018    Elevated blood pressure complicating pregnancy, antepartum, third trimester 2018    Rh+/RI/GBS neg 2018    Suspected placental problem not found 2018    Low implantation of placenta without hemorrhage 2017    Supervision of other normal pregnancy, antepartum 10/04/2017     Discussed updated COVID precautions and policies, including but not limited to outpatient testing 3-4 days prior to scheduled delivery or universal rapid screening on L&D for unscheduled delivery unless fully vaccinated. Reviewed updated visitor policy. Encouraged social distancing and appropriate hand washing/hygiene practices. Reviewed symptoms suspicious for COVID infection. Discussed that ACOG, SMFM, and the CDC recommend to not withold immunization in pregnant and breastfeeding women who meet criteria for receipt of the vaccine based on the ACIP recommended priority groups. All questions answered. Patient vocalized understanding.     MFM referral placed  Discussed s/sx that should prompt call to the office  Discussed kick inés  RTC in 4 wks    Dl Wei MD

## 2021-08-30 ENCOUNTER — ROUTINE PRENATAL (OUTPATIENT)
Dept: OBGYN CLINIC | Age: 36
End: 2021-08-30

## 2021-08-30 VITALS
HEART RATE: 90 BPM | WEIGHT: 134 LBS | SYSTOLIC BLOOD PRESSURE: 117 MMHG | DIASTOLIC BLOOD PRESSURE: 78 MMHG | BODY MASS INDEX: 24.51 KG/M2

## 2021-08-30 DIAGNOSIS — O09.299 HISTORY OF PRE-ECLAMPSIA IN PRIOR PREGNANCY, CURRENTLY PREGNANT: ICD-10-CM

## 2021-08-30 PROCEDURE — 0502F SUBSEQUENT PRENATAL CARE: CPT | Performed by: OBSTETRICS & GYNECOLOGY

## 2021-08-30 NOTE — PROGRESS NOTES
Yuly Ahn is a  @ 18w6d who presents for NIRAV visit. She denies LOF, VB or Ctxs.  + FM. She is doing well without any complaints. She denies any fevers/chills, SOB, cough, sore throat, HA, loss of taste/smell or sick contacts.      O:  Vitals:    21 0809   BP: 117/78   Pulse: 90     Gen: NAD  Abd: soft, nontender, gravid  Ext:  no edema    BP: 117/78  Weight: 134 lb (60.8 kg)  Pulse: 90  Patient Position: Sitting    A/P:  Patient Active Problem List    Diagnosis Date Noted    Medication exposure during first trimester of pregnancy 2021     Clonazepam initally  Zoloft      History of pre-eclampsia in prior pregnancy, currently pregnant 2021     PreE labs WNL      History of gestational diabetes mellitus (GDM) 2021     Early 1 hr GTT **      Atypical glandular cells on cervical Pap smear 10/12/2020     EMB & [place] 10/12/20       3/16/18 F APB 8/ Wt 6#13 2018     IOL 2/2 PreEclampsia (new onset HTN and thrombocytopenia of platelets 52,194)  Patient on magnesium during induction  Uterine atony during delivery - received Hemabate and then 600mcg buccal cytotec afterwards,, EBL 600mL      Postpartum uterine atony without hemorrhage 2018     IOL 2/2 PreEclampsia (new onset HTN and thrombocytopenia of platelets 03,506)  Patient on magnesium during induction  Uterine atony during delivery - received Hemabate and then 600mcg buccal cytotec afterwards,, EBL 600mL      High risk pregnancy, antepartum 03/15/2018    Elevated blood pressure complicating pregnancy, antepartum, third trimester 2018    Rh+/RI/GBS neg 2018    Suspected placental problem not found 2018    Low implantation of placenta without hemorrhage 2017    Supervision of other normal pregnancy, antepartum 10/04/2017     Discussed updated COVID precautions and policies, including but not limited to outpatient testing 3-4 days prior to scheduled delivery or universal rapid screening on L&D for unscheduled delivery unless fully vaccinated. Reviewed updated visitor policy. Encouraged social distancing and appropriate hand washing/hygiene practices. Reviewed symptoms suspicious for COVID infection. Discussed that ACOG, SMFM, and the CDC recommend to not withold immunization in pregnant and breastfeeding women who meet criteria for receipt of the vaccine based on the ACIP recommended priority groups. All questions answered. Patient vocalized understanding. Pt hasn't done her early 1 hr GTT, but is planning on doing it.     MFM anatomy u/s 9/14  Discussed s/sx that should prompt call to the office  Discussed kick counts  RTC in 4 wks    Juan Zepeda MD

## 2021-09-14 ENCOUNTER — TELEPHONE (OUTPATIENT)
Dept: OBGYN CLINIC | Age: 36
End: 2021-09-14

## 2021-09-14 ENCOUNTER — HOSPITAL ENCOUNTER (OUTPATIENT)
Age: 36
Discharge: HOME | End: 2021-09-14
Payer: COMMERCIAL

## 2021-09-14 ENCOUNTER — ROUTINE PRENATAL (OUTPATIENT)
Dept: PERINATAL CARE | Age: 36
End: 2021-09-14
Payer: COMMERCIAL

## 2021-09-14 VITALS
HEIGHT: 62 IN | WEIGHT: 138 LBS | DIASTOLIC BLOOD PRESSURE: 62 MMHG | TEMPERATURE: 97.1 F | SYSTOLIC BLOOD PRESSURE: 108 MMHG | BODY MASS INDEX: 25.4 KG/M2 | HEART RATE: 88 BPM | RESPIRATION RATE: 16 BRPM

## 2021-09-14 DIAGNOSIS — O35.2XX0 HEREDITARY FAMILIAL DISEASE AFFECTING MANAGEMENT OF MOTHER AND POSSIBLY AFFECTING FETUS, ANTEPARTUM, SINGLE OR UNSPECIFIED FETUS: ICD-10-CM

## 2021-09-14 DIAGNOSIS — O26.879 SHORT CERVIX AFFECTING PREGNANCY: Primary | ICD-10-CM

## 2021-09-14 DIAGNOSIS — O09.90 HIGH RISK PREGNANCY, ANTEPARTUM: ICD-10-CM

## 2021-09-14 DIAGNOSIS — O09.299 HISTORY OF PRE-ECLAMPSIA IN PRIOR PREGNANCY, CURRENTLY PREGNANT: ICD-10-CM

## 2021-09-14 DIAGNOSIS — O34.40 HISTORY OF CERVICAL LEEP BIOPSY AFFECTING CARE OF MOTHER, ANTEPARTUM: ICD-10-CM

## 2021-09-14 DIAGNOSIS — O35.8XX0 SUSPECTED DAMAGE TO FETUS FROM DISEASE IN MOTHER, ANTEPARTUM CONDITION, SINGLE OR UNSPECIFIED FETUS: ICD-10-CM

## 2021-09-14 DIAGNOSIS — Z98.890 HISTORY OF CERVICAL LEEP BIOPSY AFFECTING CARE OF MOTHER, ANTEPARTUM: ICD-10-CM

## 2021-09-14 DIAGNOSIS — O09.891 MEDICATION EXPOSURE DURING FIRST TRIMESTER OF PREGNANCY: ICD-10-CM

## 2021-09-14 DIAGNOSIS — O35.EXX0 ENCOUNTER FOR REPEAT ULTRASOUND OF FETAL PYELECTASIS IN SINGLETON PREGNANCY, ANTEPARTUM: ICD-10-CM

## 2021-09-14 DIAGNOSIS — Z3A.21 21 WEEKS GESTATION OF PREGNANCY: ICD-10-CM

## 2021-09-14 DIAGNOSIS — O26.879 CERVICAL SHORTENING, ANTEPARTUM CONDITION OR COMPLICATION: ICD-10-CM

## 2021-09-14 DIAGNOSIS — O09.522 MULTIGRAVIDA OF ADVANCED MATERNAL AGE IN SECOND TRIMESTER: ICD-10-CM

## 2021-09-14 DIAGNOSIS — Z86.32 HISTORY OF GESTATIONAL DIABETES MELLITUS (GDM): ICD-10-CM

## 2021-09-14 DIAGNOSIS — O09.522 MULTIGRAVIDA OF ADVANCED MATERNAL AGE IN SECOND TRIMESTER: Primary | ICD-10-CM

## 2021-09-14 DIAGNOSIS — O35.EXX0 ENCOUNTER FOR REPEAT ULTRASOUND OF FETAL PYELECTASIS, ANTEPARTUM, SINGLE OR UNSPECIFIED FETUS: ICD-10-CM

## 2021-09-14 PROBLEM — Z87.19 HX OF VENTRAL HERNIA REPAIR: Status: ACTIVE | Noted: 2021-09-14

## 2021-09-14 LAB
ABDOMINAL CIRCUMFERENCE: NORMAL
ABDOMINAL CIRCUMFERENCE: NORMAL
BIPARIETAL DIAMETER: NORMAL
BIPARIETAL DIAMETER: NORMAL
ESTIMATED FETAL WEIGHT: NORMAL
ESTIMATED FETAL WEIGHT: NORMAL
FEMORAL DIAMETER: NORMAL
FEMORAL DIAMETER: NORMAL
HC/AC: NORMAL
HC/AC: NORMAL
HEAD CIRCUMFERENCE: NORMAL
HEAD CIRCUMFERENCE: NORMAL

## 2021-09-14 PROCEDURE — 82105 ALPHA-FETOPROTEIN SERUM: CPT

## 2021-09-14 PROCEDURE — 76811 OB US DETAILED SNGL FETUS: CPT | Performed by: OBSTETRICS & GYNECOLOGY

## 2021-09-14 PROCEDURE — 76817 TRANSVAGINAL US OBSTETRIC: CPT | Performed by: OBSTETRICS & GYNECOLOGY

## 2021-09-14 RX ORDER — ASPIRIN 81 MG/1
81 TABLET ORAL DAILY
Qty: 30 TABLET | Refills: 5 | Status: SHIPPED | OUTPATIENT
Start: 2021-09-14 | End: 2022-06-22

## 2021-09-14 RX ORDER — PROGESTERONE 200 MG/1
200 CAPSULE ORAL DAILY
Qty: 30 CAPSULE | Refills: 5 | Status: SHIPPED | OUTPATIENT
Start: 2021-09-14 | End: 2022-06-22

## 2021-09-14 NOTE — TELEPHONE ENCOUNTER
Karissa Simon from [place] [address] called and needed a new referral placed for this pt. Referral is in .

## 2021-09-14 NOTE — PROGRESS NOTES
Maternal Fetal Medicine Ob/Gyn Resident Note       Patient here for anatomy US at 21w0d. She is found to have short cervical length of 5-6mm with large funneling. Patient has denied symptoms of cramping, pressure, vaginal discharge, or bleeding x2. Dr. Anurag Solomon contacted for plan of care. Will initiate vaginal progesterone suppositories qHS. Plan for repeat US in 1 week. Patient counseled that if she experiences any symptoms of changing vaginal discharge, vaginal bleeding, pelvic pressure, contractions, cramping, or loss of fluid she should call the office and go to L&D for evaluation. Will hold [place] course per Dr. Anurag Solomon as patient is asymptomatic. Plan of care discussed with patient, all questions answered.          Choco Pineda DO  Ob/Gyn Resident  Pager: [phone] [address] [facility]   9/14/20219:52 AM

## 2021-09-14 NOTE — PROGRESS NOTES
Initiation of serial transvaginal ultrasounds every 1-2 weeks for cervical length assessment and referral to [facility] (with [employer] home administration) for weekly 17-OH progestrone caproate injections until 36 weeks advised. Advise initiation of daily progesterone (200 mg) vaginal suppositories until 34 weeks of gestation (if patient prefers or if IM progesterone declined by [employer]). I would advise initiation of daily oral baby aspirin 81 mg based on guidelines by the USPSTF/ACOG (for preeclampsia prevention for pregnant women at \"high-risk\"  for preeclampsia). Patient declined invasive prenatal diagnostic testing today (including for evaluation and testing for fetal aneuploidy, fetal microdeletions, fetal single gene disorders, fetal microarray testing, etc). Please refer to non-invasive prenatal testing/NIPT results drawn in primary OB office (via [facility]). Sent for MSAFP (maternal serum alpha-feto protein level) only lab draw today. Advise early 1-hour glucola (if not already done) to evaluate for pregestational diabetes (history of gestational diabetes in a previous pregnancy). Fetal echo to be scheduled: Fetal exposure to SSRI medication and family history of congenital heart defect. Primary obstetrics attending physician (Dr. Farhad Terrazas) notified of the ultrasound cervical findings today and further plan of care appropriately deferred to Dr. Akila Babin, as requested.

## 2021-09-17 ENCOUNTER — ROUTINE PRENATAL (OUTPATIENT)
Dept: OBGYN CLINIC | Age: 36
End: 2021-09-17

## 2021-09-17 VITALS
HEART RATE: 93 BPM | SYSTOLIC BLOOD PRESSURE: 124 MMHG | DIASTOLIC BLOOD PRESSURE: 77 MMHG | WEIGHT: 136.38 LBS | BODY MASS INDEX: 24.94 KG/M2

## 2021-09-17 DIAGNOSIS — O26.879 SHORT CERVIX AFFECTING PREGNANCY: ICD-10-CM

## 2021-09-17 PROCEDURE — 0502F SUBSEQUENT PRENATAL CARE: CPT | Performed by: OBSTETRICS & GYNECOLOGY

## 2021-09-17 NOTE — PROGRESS NOTES
Mary Carey is a  @ 21w3d who presents for NIRAV visit. She denies LOF, VB or Ctxs.  + FM. She was seen by MFM for her anatomy scan earlier this week and was diagnosed with a short cervix. She denies any vaginal discharge or cramping. She denies any fevers/chills, SOB, cough, sore throat, loss of taste/smell or sick contacts. She denies any HA, RUQ pain or vision changes.       O:  Vitals:    21 1302   BP: 124/77   Pulse: 93     Gen: NAD  Abd: soft, nontender, gravid  Ext:  no edema    BP: 124/77  Weight: 136 lb 6 oz (61.9 kg)  Pulse: 93  Patient Position: Sitting  Fundal Height (cm): 20 cm  Fetal Heart Rate: 145  Movement: Present    A/P:  Patient Active Problem List    Diagnosis Date Noted    Short cervix affecting pregnancy 2021    Hx of LEEP () 2021     For HGSIL on Pap       Hx of ventral hernia repair w/ mesh (2019) 2021     Diagnostic laparoscopy with open ventral hernia repair with mesh (19)      Medication exposure during first trimester of pregnancy 2021     Clonazepam initally  Zoloft      Hx of PreE w/ SF's (G2) 2021     Baseline PreE labs WNL    New onset hypertension and thrombocytopenia (Plt 90,000)  Received Mag x24 hours       Hx of GDMA1 (G2) 2021     Early 1 hr GTT **      Atypical glandular cells on cervical Pap smear 10/12/2020     EMB & [place] 10/12/20       3/16/18 F APB 8/9 Wt 6#13 2018     IOL 2/2 PreEclampsia (new onset HTN and thrombocytopenia of platelets 06,216)  Patient on magnesium during induction  Uterine atony during delivery - received Hemabate and then 600mcg buccal cytotec afterwards,, EBL 600mL      Postpartum uterine atony without hemorrhage (G2) 2018     IOL 2/2 PreEclampsia (new onset HTN and thrombocytopenia of platelets 48,701)  Patient on magnesium during induction  Uterine atony during delivery - received Hemabate and then 600mcg buccal cytotec afterwards,, EBL 600mL      High risk pregnancy, antepartum 03/15/2018    Elevated blood pressure complicating pregnancy, antepartum, third trimester 03/14/2018    Rh+/RI/GBS neg 03/14/2018    Suspected placental problem not found 02/14/2018    Low implantation of placenta without hemorrhage 11/20/2017    Supervision of other normal pregnancy, antepartum 10/04/2017     Discussed updated COVID precautions and policies, including but not limited to outpatient testing 3-4 days prior to scheduled delivery or universal rapid screening on L&D for unscheduled delivery unless fully vaccinated. Reviewed updated visitor policy. Encouraged social distancing and appropriate hand washing/hygiene practices. Reviewed symptoms suspicious for COVID infection. Discussed that ACOG, SMFM, and the CDC recommend to not withold immunization in pregnant and breastfeeding women who meet criteria for receipt of the vaccine based on the ACIP recommended priority groups. All questions answered. Patient vocalized understanding.     Discussed s/sx that should prompt call to the office  Discussed kick counts  RTC in 2-3 wks    Mine Beltran MD

## 2021-09-18 LAB
AFP INTERPRETATION: NORMAL
AFP MOM: 0.96
AFP SPECIMEN: NORMAL
AFP: 71 NG/ML
DATE OF BIRTH: NORMAL
DATING METHOD: NORMAL
DETERMINED BY: NORMAL
DIABETIC: NEGATIVE
DONOR EGG?: NORMAL
DUE DATE: NORMAL
ESTIMATED DUE DATE: NORMAL
FAMILY HISTORY NTD: NEGATIVE
GESTATIONAL AGE: NORMAL
IN VITRO FERTILIZATION: NORMAL
INSULIN REQ DIABETES: NO
LAST MENSTRUAL PERIOD: NORMAL
MATERNAL AGE AT EDD: 35.4 YR
MATERNAL WEIGHT: 138
MONOCHORIONIC TWINS: NORMAL
NUMBER OF FETUSES: NORMAL
PATIENT WEIGHT UNITS: NORMAL
PATIENT WEIGHT: NORMAL
RACE (MATERNAL): NORMAL
RACE: NORMAL
REPEAT SPECIMEN?: NORMAL
SMOKING: NORMAL
SMOKING: NORMAL
VALPROIC/CARBAMAZEP: NORMAL
ZZ NTE CLEAN UP: HISTORY: NO

## 2021-09-21 ENCOUNTER — ROUTINE PRENATAL (OUTPATIENT)
Dept: PERINATAL CARE | Age: 36
End: 2021-09-21
Payer: COMMERCIAL

## 2021-09-21 ENCOUNTER — HOSPITAL ENCOUNTER (OUTPATIENT)
Age: 36
Discharge: HOME | End: 2021-09-21
Attending: OBSTETRICS & GYNECOLOGY | Admitting: OBSTETRICS & GYNECOLOGY
Payer: COMMERCIAL

## 2021-09-21 VITALS
TEMPERATURE: 98.3 F | HEART RATE: 74 BPM | SYSTOLIC BLOOD PRESSURE: 118 MMHG | RESPIRATION RATE: 16 BRPM | DIASTOLIC BLOOD PRESSURE: 69 MMHG

## 2021-09-21 VITALS
HEART RATE: 81 BPM | DIASTOLIC BLOOD PRESSURE: 67 MMHG | RESPIRATION RATE: 16 BRPM | SYSTOLIC BLOOD PRESSURE: 105 MMHG | TEMPERATURE: 97 F | WEIGHT: 139 LBS | HEIGHT: 62 IN | BODY MASS INDEX: 25.58 KG/M2

## 2021-09-21 DIAGNOSIS — Z3A.22 22 WEEKS GESTATION OF PREGNANCY: ICD-10-CM

## 2021-09-21 DIAGNOSIS — O26.879 CERVICAL SHORTENING, ANTEPARTUM CONDITION OR COMPLICATION: Primary | ICD-10-CM

## 2021-09-21 DIAGNOSIS — Z98.890 HISTORY OF CERVICAL LEEP BIOPSY AFFECTING CARE OF MOTHER, ANTEPARTUM: ICD-10-CM

## 2021-09-21 DIAGNOSIS — O34.40 HISTORY OF CERVICAL LEEP BIOPSY AFFECTING CARE OF MOTHER, ANTEPARTUM: ICD-10-CM

## 2021-09-21 PROBLEM — Z03.72 SUSPECTED PLACENTAL PROBLEM NOT FOUND: Status: RESOLVED | Noted: 2018-02-14 | Resolved: 2021-09-21

## 2021-09-21 PROBLEM — O16.3 ELEVATED BLOOD PRESSURE COMPLICATING PREGNANCY, ANTEPARTUM, THIRD TRIMESTER: Status: RESOLVED | Noted: 2018-03-14 | Resolved: 2021-09-21

## 2021-09-21 PROBLEM — O09.92 HIGH-RISK PREGNANCY IN SECOND TRIMESTER: Status: ACTIVE | Noted: 2021-09-21

## 2021-09-21 PROBLEM — Z34.80 SUPERVISION OF OTHER NORMAL PREGNANCY, ANTEPARTUM: Status: RESOLVED | Noted: 2017-10-04 | Resolved: 2021-09-21

## 2021-09-21 PROBLEM — O44.40 LOW IMPLANTATION OF PLACENTA WITHOUT HEMORRHAGE: Status: RESOLVED | Noted: 2017-11-20 | Resolved: 2021-09-21

## 2021-09-21 PROBLEM — O09.90 HIGH RISK PREGNANCY, ANTEPARTUM: Status: RESOLVED | Noted: 2018-03-15 | Resolved: 2021-09-21

## 2021-09-21 LAB
BILIRUBIN URINE: NEGATIVE
COLOR: YELLOW
COMMENT UA: NORMAL
DIRECT EXAM: NORMAL
GLUCOSE URINE: NEGATIVE
KETONES, URINE: NEGATIVE
LEUKOCYTE ESTERASE, URINE: NEGATIVE
Lab: NORMAL
NITRITE, URINE: NEGATIVE
PH UA: 7 (ref 5–8)
PROTEIN UA: NEGATIVE
SPECIFIC GRAVITY UA: 1.02 (ref 1–1.03)
SPECIMEN DESCRIPTION: NORMAL
TURBIDITY: CLEAR
URINE HGB: NEGATIVE
UROBILINOGEN, URINE: NORMAL

## 2021-09-21 PROCEDURE — 6360000002 HC RX W HCPCS: Performed by: STUDENT IN AN ORGANIZED HEALTH CARE EDUCATION/TRAINING PROGRAM

## 2021-09-21 PROCEDURE — 87480 CANDIDA DNA DIR PROBE: CPT

## 2021-09-21 PROCEDURE — 87660 TRICHOMONAS VAGIN DIR PROBE: CPT

## 2021-09-21 PROCEDURE — 76817 TRANSVAGINAL US OBSTETRIC: CPT | Performed by: OBSTETRICS & GYNECOLOGY

## 2021-09-21 PROCEDURE — 87510 GARDNER VAG DNA DIR PROBE: CPT

## 2021-09-21 PROCEDURE — 96372 THER/PROPH/DIAG INJ SC/IM: CPT

## 2021-09-21 PROCEDURE — 87591 N.GONORRHOEAE DNA AMP PROB: CPT

## 2021-09-21 PROCEDURE — 87491 CHLMYD TRACH DNA AMP PROBE: CPT

## 2021-09-21 PROCEDURE — 99213 OFFICE O/P EST LOW 20 MIN: CPT

## 2021-09-21 PROCEDURE — 99205 OFFICE O/P NEW HI 60 MIN: CPT | Performed by: OBSTETRICS & GYNECOLOGY

## 2021-09-21 PROCEDURE — 81003 URINALYSIS AUTO W/O SCOPE: CPT

## 2021-09-21 RX ORDER — ONDANSETRON 2 MG/ML
4 INJECTION INTRAMUSCULAR; INTRAVENOUS EVERY 6 HOURS PRN
Status: DISCONTINUED | OUTPATIENT
Start: 2021-09-21 | End: 2021-09-21 | Stop reason: HOSPADM

## 2021-09-21 RX ORDER — ONDANSETRON 4 MG/1
4 TABLET, ORALLY DISINTEGRATING ORAL EVERY 8 HOURS PRN
Status: DISCONTINUED | OUTPATIENT
Start: 2021-09-21 | End: 2021-09-21 | Stop reason: HOSPADM

## 2021-09-21 RX ORDER — ACETAMINOPHEN 325 MG/1
650 TABLET ORAL EVERY 4 HOURS PRN
Status: DISCONTINUED | OUTPATIENT
Start: 2021-09-21 | End: 2021-09-21 | Stop reason: HOSPADM

## 2021-09-21 RX ORDER — BETAMETHASONE SODIUM PHOSPHATE AND BETAMETHASONE ACETATE 3; 3 MG/ML; MG/ML
12 INJECTION, SUSPENSION INTRA-ARTICULAR; INTRALESIONAL; INTRAMUSCULAR; SOFT TISSUE ONCE
Status: COMPLETED | OUTPATIENT
Start: 2021-09-21 | End: 2021-09-21

## 2021-09-21 RX ADMIN — BETAMETHASONE SODIUM PHOSPHATE AND BETAMETHASONE ACETATE 12 MG: 3; 3 INJECTION, SUSPENSION INTRA-ARTICULAR; INTRALESIONAL; INTRAMUSCULAR at 13:19

## 2021-09-21 NOTE — H&P
OBSTETRICAL HISTORY [facility]    Date: 2021       Time: 1:32 PM   Patient Name: Alfonso Vallecillo     Patient : 1985  Room/Bed: 2797/4051-24    Admission Date/Time: 2021 11:00 AM      CC: No residual cervical length seen on [employer] US     HPI: Alfonso Vallecillo is a 29 y.o. [id number] at 22w0d who presents to L&D from [place] [id number] for no residual cervical length seen on today's US. The patient was seen last week for anatomy US and routine cervical length screening and was found to have a shortened cervix of 5-6mm at that time. Due to early gestational age of 20w0d at that time and lack of symptoms, patient was not evaluated on L&D. She was started on vaginal progesterone 200mg qhs and reports she has been compliant with this for the past week. She has a history of a LEEP but does have 1 full term delivery after the procedure. Today she is feeling well without complaints. She denies abdominal cramping, contractions, changes in pelvic pressure, vaginal bleeding, loss of fluid, or changes in vaginal discharge. The patient reports fetal movement is present. Patient denies any fever, chills, N/V, headaches, vision changes, chest pain, shortness of breath, RUQ pain, abdominal pain, and increased swelling/tenderness in bilateral lower extremities. Patient denies any urinary complaints. DATING:  LMP: Patient's last menstrual period was 2021.   Estimated Date of Delivery: 22   Based on: early ultrasound at 6 weeks 6 days gestation    PREGNANCY RISK FACTORS:  Patient Active Problem List   Diagnosis    Low implantation of placenta without hemorrhage    Rh+/RI/GBS neg    Postpartum uterine atony without hemorrhage (G2)    Atypical glandular cells on cervical Pap smear    Hx of PreE w/ SF's (G2)    Hx of GDMA1 (G2)    Medication exposure during first trimester of pregnancy    Hx of LEEP (2007)    Hx of ventral hernia repair w/ mesh (2019)    Short cervix affecting pregnancy    22 weeks gestation of pregnancy        Steroids Given In This Pregnancy:  no     REVIEW OF SYSTEMS:  Constitutional: negative fever, chills  HEENT: negative headaches, visual disturbances  Respiratory: negative dyspnea, cough, wheezing  Cardiovascular: negative chest pain, palpitations  Gastrointestinal: negative abdominal pain, RUQ pain, N/V, diarrhea, constipation  Genitourinary: negative dysuria, frequency, hesitancy, hematuria, changes in vaginal discharge  Dermatological: negative rash  Hematologic: negative bruising  Immunologic/Lymphatic: negative recent illness, recent sick contact, LE swelling   Musculoskeletal: negative back pain, myalgias, arthralgias  Neurological: negative dizziness, weakness, loss of sensation, tingling  Behavior/Psych: negative depression, anxiety  Obstetrics: positive fetal movement, negative LOF, negative vaginal bleeding, negative contractions, negative pelvic cramping      OBSTETRICAL HISTORY:   OB History    Para Term  AB Living   4 2 2 0 1 2   SAB TAB Ectopic Molar Multiple Live Births   1 0 0 0 0 2      # Outcome Date GA Lbr Tulio/2nd Weight Sex Delivery Anes PTL Lv   4 Current            3 SAB 20     SAB      2 Term 03/15/18 37w4d / 00:27 6 lb 13.5 oz (3.105 kg) F Vag-Spont None N REED      Complications: Preeclampsia, Postpartum uterine atony without hemorrhage      Name: Bess Lyle: 8  Apgar5: 9   1 Term 03/10/06   7 lb 1 oz (3.204 kg) M Vag-Spont   REED       PAST MEDICAL HISTORY:   has a past medical history of Abnormal Pap smear, Adopted, Asthma, and Tachycardia. PAST SURGICAL HISTORY:   has a past surgical history that includes LEEP (2007); Dental surgery; Wrist surgery; laparoscopy (2019); and ventral hernia repair (N/A, 2019). ALLERGIES:  is allergic to bactrim [sulfamethoxazole-trimethoprim]. MEDICATIONS:  Prior to Admission medications    Medication Sig Start Date End Date Taking? Authorizing Provider   progesterone (PROMETRIUM) 200 MG CAPS capsule Take 1 capsule by mouth daily DO NOT TAKE ORALLY, PLACE VAGINALLY 21  Yes Patricia Garcia DO   aspirin EC 81 MG EC tablet Take 1 tablet by mouth daily 21  Yes Patricia Garcia,    sertraline (ZOLOFT) 50 MG tablet Take 1 tablet by mouth daily 21  Yes Mine Beltran MD   Prenatal Multivit-Min-Fe-FA (PRE- PO) Take by mouth   Yes Historical Provider, MD       FAMILY HISTORY:  family history is not on file. SOCIAL HISTORY:   reports that she has never smoked. She has never used smokeless tobacco. She reports previous alcohol use. She reports that she does not use drugs.     VITALS:  Vitals:    21 1130   BP: 118/69   Pulse: 74   Resp: 16   Temp: 98.3 °F (36.8 °C)         PHYSICAL EXAM:  Fetal Heart Tones: 160  Haring: no contractions     General appearance:  awake, alert, cooperative, no apparent distress, and appears stated age  HEENT: head atraumatic, normocephalic, moist mucous membranes, trachea midline  Neurologic:  alert, oriented, normal speech, no focal findings or movement disorder noted  Lungs:  No increased work of breathing, good air exchange, clear to auscultation bilaterally, no crackles or wheezing  Heart:  Normal apical impulse, regular rate and rhythm, normal S1 and S2, no S3 or S4, and no murmur noted    Abdomen:  gravid, non-tender, and no rebound, guarding, or rigidity  Extremities:  no calf tenderness, non edematous  Musculoskeletal: Gross strength equal and intact throughout, no gross abnormalities, no CVA tenderness  Psychiatric: Mood appropriate, normal affect   Rectal Exam: not indicated  Sterile Speculum Exam: Performed w/ Dr. Danielle Anne   Urethral meatus: normal appearing   Vulva: Normal hair distribution, normal appearing vulva, no masses, tenderness or lesions, normal clitoris   Vagina: Normal appearing vaginal mucosa without lesions, copious thick white-yellow vaginal discharge noted in the HGSIL on Pap       Hx of ventral hernia repair w/ mesh (2019) 09/14/2021     Diagnostic laparoscopy with open ventral hernia repair with mesh (1/16/19)      Medication exposure during first trimester of pregnancy 07/09/2021     Clonazepam initally  Zoloft      Hx of PreE w/ SF's (G2) 06/07/2021     Baseline PreE labs WNL    New onset hypertension and thrombocytopenia (Plt 90,000)  Received Mag x24 hours       Hx of GDMA1 (G2) 06/07/2021     Early 1 hr GTT **      Atypical glandular cells on cervical Pap smear 10/12/2020     EMB & [place] 10/12/20      Postpartum uterine atony without hemorrhage (G2) 03/19/2018     IOL 2/2 PreEclampsia (new onset HTN and thrombocytopenia of platelets 42,675)  Patient on magnesium during induction  Uterine atony during delivery - received Hemabate and then 600mcg buccal cytotec afterwards,, EBL 600mL      Rh+/RI/GBS neg 03/14/2018    Low implantation of placenta without hemorrhage 11/20/2017       Plan discussed with Dr. Giovanna Jara, who is agreeable. Steroids given this admission: Yes    Risks, benefits, alternatives and possible complications have been discussed in detail with the patient. Admission, and post admission procedures and expectations were discussed in detail. All questions were answered.     Attending's Name: Dr. Ruben Cazares DO  Ob/Gyn Resident  [facility]  9/21/2021, 1:32 PM

## 2021-09-22 ENCOUNTER — HOSPITAL ENCOUNTER (OUTPATIENT)
Age: 36
Discharge: HOME | End: 2021-09-22
Attending: OBSTETRICS & GYNECOLOGY | Admitting: OBSTETRICS & GYNECOLOGY
Payer: COMMERCIAL

## 2021-09-22 VITALS
DIASTOLIC BLOOD PRESSURE: 69 MMHG | SYSTOLIC BLOOD PRESSURE: 137 MMHG | HEART RATE: 87 BPM | TEMPERATURE: 98.8 F | RESPIRATION RATE: 20 BRPM

## 2021-09-22 LAB
C TRACH DNA GENITAL QL NAA+PROBE: ABNORMAL
N. GONORRHOEAE DNA: ABNORMAL
SPECIMEN DESCRIPTION: ABNORMAL

## 2021-09-22 PROCEDURE — 87491 CHLMYD TRACH DNA AMP PROBE: CPT

## 2021-09-22 PROCEDURE — 6360000002 HC RX W HCPCS: Performed by: OBSTETRICS & GYNECOLOGY

## 2021-09-22 PROCEDURE — 87591 N.GONORRHOEAE DNA AMP PROB: CPT

## 2021-09-22 PROCEDURE — 96372 THER/PROPH/DIAG INJ SC/IM: CPT

## 2021-09-22 RX ORDER — BETAMETHASONE SODIUM PHOSPHATE AND BETAMETHASONE ACETATE 3; 3 MG/ML; MG/ML
12 INJECTION, SUSPENSION INTRA-ARTICULAR; INTRALESIONAL; INTRAMUSCULAR; SOFT TISSUE ONCE
Status: DISCONTINUED | OUTPATIENT
Start: 2021-09-22 | End: 2021-09-22

## 2021-09-22 RX ORDER — BETAMETHASONE SODIUM PHOSPHATE AND BETAMETHASONE ACETATE 3; 3 MG/ML; MG/ML
12 INJECTION, SUSPENSION INTRA-ARTICULAR; INTRALESIONAL; INTRAMUSCULAR; SOFT TISSUE ONCE
Status: COMPLETED | OUTPATIENT
Start: 2021-09-22 | End: 2021-09-22

## 2021-09-22 RX ADMIN — BETAMETHASONE SODIUM PHOSPHATE AND BETAMETHASONE ACETATE 12 MG: 3; 3 INJECTION, SUSPENSION INTRA-ARTICULAR; INTRALESIONAL; INTRAMUSCULAR at 13:18

## 2021-09-22 NOTE — PROGRESS NOTES
Obstetric/Gynecology Resident Interval Note    Patient presented for 2nd dose of Celestone and denies any complaints. Gc/C collected yesterday resulted as \"indeterminate; amplification of target nucleic acid may have been affected by inhibitory substances present in the specimen. \" Patient agreeable to Gc/C swab today.      Kristopher Bowman DO  OB/GYN Resident, PGY1  [facility] [place]  9/22/2021, 12:58 PM

## 2021-10-01 ENCOUNTER — TELEPHONE (OUTPATIENT)
Dept: OBGYN CLINIC | Age: 36
End: 2021-10-01

## 2021-10-05 ENCOUNTER — ROUTINE PRENATAL (OUTPATIENT)
Dept: PERINATAL CARE | Age: 36
End: 2021-10-05
Payer: COMMERCIAL

## 2021-10-05 VITALS
HEIGHT: 62 IN | BODY MASS INDEX: 25.95 KG/M2 | HEART RATE: 94 BPM | WEIGHT: 141 LBS | DIASTOLIC BLOOD PRESSURE: 72 MMHG | RESPIRATION RATE: 16 BRPM | TEMPERATURE: 97.2 F | SYSTOLIC BLOOD PRESSURE: 130 MMHG

## 2021-10-05 DIAGNOSIS — O35.8XX0 SUSPECTED DAMAGE TO FETUS FROM DISEASE IN MOTHER, ANTEPARTUM CONDITION, SINGLE OR UNSPECIFIED FETUS: ICD-10-CM

## 2021-10-05 DIAGNOSIS — Z3A.24 24 WEEKS GESTATION OF PREGNANCY: ICD-10-CM

## 2021-10-05 DIAGNOSIS — O35.2XX0 HEREDITARY FAMILIAL DISEASE AFFECTING MANAGEMENT OF MOTHER AND POSSIBLY AFFECTING FETUS, ANTEPARTUM, SINGLE OR UNSPECIFIED FETUS: Primary | ICD-10-CM

## 2021-10-05 DIAGNOSIS — Z03.73 SUSPECTED FETAL ANOMALY NOT FOUND: ICD-10-CM

## 2021-10-05 DIAGNOSIS — O35.EXX0 ENCOUNTER FOR REPEAT ULTRASOUND OF FETAL PYELECTASIS, ANTEPARTUM, SINGLE OR UNSPECIFIED FETUS: ICD-10-CM

## 2021-10-05 DIAGNOSIS — Z36.4 ANTENATAL SCREENING FOR FETAL GROWTH RETARDATION USING ULTRASONICS: ICD-10-CM

## 2021-10-05 DIAGNOSIS — O09.522 MULTIGRAVIDA OF ADVANCED MATERNAL AGE IN SECOND TRIMESTER: ICD-10-CM

## 2021-10-05 PROCEDURE — 76816 OB US FOLLOW-UP PER FETUS: CPT | Performed by: OBSTETRICS & GYNECOLOGY

## 2021-10-05 PROCEDURE — 76827 ECHO EXAM OF FETAL HEART: CPT | Performed by: OBSTETRICS & GYNECOLOGY

## 2021-10-05 PROCEDURE — 93325 DOPPLER ECHO COLOR FLOW MAPG: CPT | Performed by: OBSTETRICS & GYNECOLOGY

## 2021-10-05 PROCEDURE — 76825 ECHO EXAM OF FETAL HEART: CPT | Performed by: OBSTETRICS & GYNECOLOGY

## 2021-10-06 ENCOUNTER — ROUTINE PRENATAL (OUTPATIENT)
Dept: OBGYN CLINIC | Age: 36
End: 2021-10-06

## 2021-10-06 VITALS
DIASTOLIC BLOOD PRESSURE: 74 MMHG | SYSTOLIC BLOOD PRESSURE: 117 MMHG | BODY MASS INDEX: 25.35 KG/M2 | WEIGHT: 138.6 LBS | HEART RATE: 92 BPM

## 2021-10-06 DIAGNOSIS — Z3A.24 24 WEEKS GESTATION OF PREGNANCY: Primary | ICD-10-CM

## 2021-10-06 PROCEDURE — 0502F SUBSEQUENT PRENATAL CARE: CPT | Performed by: OBSTETRICS & GYNECOLOGY

## 2021-10-06 NOTE — PROGRESS NOTES
Lafayette Babinski is a  @ 24w1d who presents for NIRAV visit. She denies LOF, VB or Ctxs.  + FM. She is having some pressure, but otherwise no complaints of cramping or ctxs. She denies any fevers/chills, SOB, cough, sore throat, loss of taste/smell or sick contacts. She denies any HA, vision changes or RUQ pain. O:  Vitals:    10/06/21 0823   BP: 117/74   Pulse: 92     Gen: NAD  Abd: soft, nontender, gravid  Ext:  no edema      BP: 117/74  Weight: 138 lb 9.6 oz (62.9 kg)  Pulse: 92  Patient Position: Sitting  Fetal Heart Rate: 150  Movement: Present    A/P:  Patient Active Problem List    Diagnosis Date Noted    22 weeks gestation of pregnancy 2021    celestone 21 & 2021 Patient seen and evaluated for no residual cervix at MFM, evaluated on L&D, visually dilated w/ visible bag of water. Patient elected for outpatient celestone w/ first shot today. Currently asymptomatic.        No residual cervix noted on MFM US 2021    Hx of LEEP () 2021     For HGSIL on Pap       Hx of ventral hernia repair w/ mesh (2019) 2021     Diagnostic laparoscopy with open ventral hernia repair with mesh (19)      Medication exposure during first trimester of pregnancy 2021     Clonazepam initally  Zoloft      Hx of PreE w/ SF's (G2) 2021     Baseline PreE labs WNL    New onset hypertension and thrombocytopenia (Plt 90,000)  Received Mag x24 hours       Hx of GDMA1 (G2) 2021     Early 1 hr GTT **      Atypical glandular cells on cervical Pap smear 10/12/2020     EMB & [place] 10/12/20      Postpartum uterine atony without hemorrhage (G2) 2018     IOL 2/2 PreEclampsia (new onset HTN and thrombocytopenia of platelets 72,374)  Patient on magnesium during induction  Uterine atony during delivery - received Hemabate and then 600mcg buccal cytotec afterwards,, EBL 600mL      Rh+/RI/GBS neg 2018     Discussed updated COVID precautions and policies, including but not limited to outpatient testing 3-4 days prior to scheduled delivery or universal rapid screening on L&D for unscheduled delivery unless fully vaccinated. Reviewed updated visitor policy. Encouraged social distancing and appropriate hand washing/hygiene practices. Reviewed symptoms suspicious for COVID infection. Discussed that ACOG, SMFM, and the CDC recommend to not withold immunization in pregnant and breastfeeding women who meet criteria for receipt of the vaccine based on the ACIP recommended priority groups. All questions answered. Patient vocalized understanding.     Following with MFM for short cervix, using vaginal progesterone  1 hr GTT & CBC labs given  Discussed s/sx that should prompt call to the office  Discussed kick inés  RTC in 4 wks    Nathan Devine MD

## 2021-11-02 ENCOUNTER — ROUTINE PRENATAL (OUTPATIENT)
Dept: PERINATAL CARE | Age: 36
End: 2021-11-02
Payer: COMMERCIAL

## 2021-11-02 VITALS
DIASTOLIC BLOOD PRESSURE: 58 MMHG | RESPIRATION RATE: 16 BRPM | SYSTOLIC BLOOD PRESSURE: 114 MMHG | HEIGHT: 62 IN | BODY MASS INDEX: 26.87 KG/M2 | TEMPERATURE: 97.1 F | WEIGHT: 146 LBS | HEART RATE: 90 BPM

## 2021-11-02 DIAGNOSIS — O35.2XX0 HEREDITARY FAMILIAL DISEASE AFFECTING MANAGEMENT OF MOTHER AND POSSIBLY AFFECTING FETUS, ANTEPARTUM, SINGLE OR UNSPECIFIED FETUS: ICD-10-CM

## 2021-11-02 DIAGNOSIS — O09.523 MULTIGRAVIDA OF ADVANCED MATERNAL AGE IN THIRD TRIMESTER: ICD-10-CM

## 2021-11-02 DIAGNOSIS — Z13.89 ENCOUNTER FOR ROUTINE SCREENING FOR MALFORMATION USING ULTRASONICS: ICD-10-CM

## 2021-11-02 DIAGNOSIS — Z3A.28 28 WEEKS GESTATION OF PREGNANCY: ICD-10-CM

## 2021-11-02 DIAGNOSIS — O35.8XX0 SUSPECTED DAMAGE TO FETUS FROM DISEASE IN MOTHER, ANTEPARTUM CONDITION, SINGLE OR UNSPECIFIED FETUS: ICD-10-CM

## 2021-11-02 DIAGNOSIS — Z03.73 SUSPECTED FETAL ANOMALY NOT FOUND: ICD-10-CM

## 2021-11-02 DIAGNOSIS — O35.EXX0 ENCOUNTER FOR REPEAT ULTRASOUND OF FETAL PYELECTASIS, ANTEPARTUM, SINGLE OR UNSPECIFIED FETUS: Primary | ICD-10-CM

## 2021-11-02 LAB
ABDOMINAL CIRCUMFERENCE: NORMAL
BIPARIETAL DIAMETER: NORMAL
ESTIMATED FETAL WEIGHT: NORMAL
FEMORAL DIAMETER: NORMAL
HC/AC: NORMAL
HEAD CIRCUMFERENCE: NORMAL

## 2021-11-02 PROCEDURE — 76805 OB US >/= 14 WKS SNGL FETUS: CPT | Performed by: OBSTETRICS & GYNECOLOGY

## 2021-11-02 PROCEDURE — 76819 FETAL BIOPHYS PROFIL W/O NST: CPT | Performed by: OBSTETRICS & GYNECOLOGY

## 2021-11-08 ENCOUNTER — ROUTINE PRENATAL (OUTPATIENT)
Dept: OBGYN CLINIC | Age: 36
End: 2021-11-08

## 2021-11-08 ENCOUNTER — HOSPITAL ENCOUNTER (OUTPATIENT)
Facility: CLINIC | Age: 36
Discharge: HOME | End: 2021-11-08
Payer: COMMERCIAL

## 2021-11-08 VITALS
HEART RATE: 77 BPM | BODY MASS INDEX: 26.34 KG/M2 | WEIGHT: 144 LBS | DIASTOLIC BLOOD PRESSURE: 75 MMHG | SYSTOLIC BLOOD PRESSURE: 113 MMHG

## 2021-11-08 DIAGNOSIS — Z3A.24 24 WEEKS GESTATION OF PREGNANCY: ICD-10-CM

## 2021-11-08 DIAGNOSIS — Z86.32 HISTORY OF GESTATIONAL DIABETES MELLITUS (GDM): ICD-10-CM

## 2021-11-08 DIAGNOSIS — Z34.93 PRENATAL CARE IN THIRD TRIMESTER: Primary | ICD-10-CM

## 2021-11-08 LAB
ABSOLUTE EOS #: 0.09 K/UL (ref 0–0.44)
ABSOLUTE IMMATURE GRANULOCYTE: 0.12 K/UL (ref 0–0.3)
ABSOLUTE LYMPH #: 2 K/UL (ref 1.1–3.7)
ABSOLUTE MONO #: 0.93 K/UL (ref 0.1–1.2)
BASOPHILS # BLD: 0 % (ref 0–2)
BASOPHILS ABSOLUTE: 0.04 K/UL (ref 0–0.2)
DIFFERENTIAL TYPE: ABNORMAL
EOSINOPHILS RELATIVE PERCENT: 1 % (ref 1–4)
GLUCOSE ADMINISTRATION: ABNORMAL
GLUCOSE TOLERANCE SCREEN 50G: 146 MG/DL (ref 70–135)
HCT VFR BLD CALC: 33.7 % (ref 36.3–47.1)
HEMOGLOBIN: 11.5 G/DL (ref 11.9–15.1)
IMMATURE GRANULOCYTES: 1 %
LYMPHOCYTES # BLD: 18 % (ref 24–43)
MCH RBC QN AUTO: 33.2 PG (ref 25.2–33.5)
MCHC RBC AUTO-ENTMCNC: 34.1 G/DL (ref 28.4–34.8)
MCV RBC AUTO: 97.4 FL (ref 82.6–102.9)
MONOCYTES # BLD: 8 % (ref 3–12)
NRBC AUTOMATED: 0 PER 100 WBC
PDW BLD-RTO: 12.1 % (ref 11.8–14.4)
PLATELET # BLD: 184 K/UL (ref 138–453)
PLATELET ESTIMATE: ABNORMAL
PMV BLD AUTO: 11 FL (ref 8.1–13.5)
RBC # BLD: 3.46 M/UL (ref 3.95–5.11)
RBC # BLD: ABNORMAL 10*6/UL
SEG NEUTROPHILS: 72 % (ref 36–65)
SEGMENTED NEUTROPHILS ABSOLUTE COUNT: 7.96 K/UL (ref 1.5–8.1)
WBC # BLD: 11.1 K/UL (ref 3.5–11.3)
WBC # BLD: ABNORMAL 10*3/UL

## 2021-11-08 PROCEDURE — 0502F SUBSEQUENT PRENATAL CARE: CPT | Performed by: OBSTETRICS & GYNECOLOGY

## 2021-11-08 PROCEDURE — 85025 COMPLETE CBC W/AUTO DIFF WBC: CPT

## 2021-11-08 PROCEDURE — 36415 COLL VENOUS BLD VENIPUNCTURE: CPT

## 2021-11-08 PROCEDURE — 82950 GLUCOSE TEST: CPT

## 2021-11-09 DIAGNOSIS — O99.810 ABNORMAL GLUCOSE TOLERANCE TEST (GTT) DURING PREGNANCY, ANTEPARTUM: Primary | ICD-10-CM

## 2021-11-22 ENCOUNTER — TELEMEDICINE (OUTPATIENT)
Dept: OBGYN CLINIC | Age: 36
End: 2021-11-22

## 2021-11-22 DIAGNOSIS — U07.1 SARS-COV-2 POSITIVE: ICD-10-CM

## 2021-11-22 PROCEDURE — 0502F SUBSEQUENT PRENATAL CARE: CPT | Performed by: OBSTETRICS & GYNECOLOGY

## 2021-11-22 ASSESSMENT — ENCOUNTER SYMPTOMS
SHORTNESS OF BREATH: 0
ABDOMINAL PAIN: 0
COUGH: 0
BACK PAIN: 0

## 2021-11-22 NOTE — PROGRESS NOTES
TELEHEALTH EVALUATION -- Audio/Visual (During QZUKI-06 public health emergency)    HPI:    This visit was completed via MyChart video due to the restrictions of the COVID-19 pandemic. All issues as below were discussed and addressed but no physical exam was performed unless allowed by visual confirmation on MyChart video. Reviewed that if it was felt that the patient should be evaluated in clinic then she would be directed there. The patient verbally consented to visit. Abisai Zoë has requested an audio/video evaluation for the following concern(s): prenatal visit    She is doing well without any complaints. She did test positive for COVID . She is having some fatigue, but otherwise says she doesn't have any other symptoms at this time. She says she just feels a little off and a bit foggy. She denies any ctxs, LOF, vaginal bleeding or discharge. She reports +FM. She denies any headaches, changes in vision, RUQ pain or increased swelling. Review of Systems   Constitutional: Positive for fatigue. Negative for chills and fever. HENT: Positive for congestion. Respiratory: Negative for cough and shortness of breath. Cardiovascular: Negative for chest pain and palpitations. Gastrointestinal: Negative for abdominal pain. Genitourinary: Negative for dyspareunia, pelvic pain and vaginal discharge. Musculoskeletal: Negative for back pain. Neurological: Negative for dizziness and light-headedness. Psychiatric/Behavioral: The patient is not nervous/anxious.           Current Outpatient Medications   Medication Sig Dispense Refill    progesterone (PROMETRIUM) 200 MG CAPS capsule Take 1 capsule by mouth daily DO NOT TAKE ORALLY, PLACE VAGINALLY 30 capsule 5    aspirin EC 81 MG EC tablet Take 1 tablet by mouth daily 30 tablet 5    sertraline (ZOLOFT) 50 MG tablet Take 1 tablet by mouth daily 30 tablet 5    Prenatal Multivit-Min-Fe-FA (PRE- PO) Take by mouth       No current facility-administered medications for this visit. Social History     Socioeconomic History    Marital status:      Spouse name: Not on file    Number of children: Not on file    Years of education: Not on file    Highest education level: Not on file   Occupational History    Not on file   Tobacco Use    Smoking status: Never Smoker    Smokeless tobacco: Never Used   Vaping Use    Vaping Use: Never used   Substance and Sexual Activity    Alcohol use: Not Currently     Comment: occ    Drug use: Never    Sexual activity: Yes     Partners: Male   Other Topics Concern    Not on file   Social History Narrative    Not on file     Social Determinants of Health     Financial Resource Strain:     Difficulty of Paying Living Expenses: Not on file   Food Insecurity:     Worried About Running Out of Food in the Last Year: Not on file    Vinay of Food in the Last Year: Not on file   Transportation Needs:     Lack of Transportation (Medical): Not on file    Lack of Transportation (Non-Medical):  Not on file   Physical Activity:     Days of Exercise per Week: Not on file    Minutes of Exercise per Session: Not on file   Stress:     Feeling of Stress : Not on file   Social Connections:     Frequency of Communication with Friends and Family: Not on file    Frequency of Social Gatherings with Friends and Family: Not on file    Attends Taoist Services: Not on file    Active Member of [address] [place] [address] or Organizations: Not on file    Attends Club or Organization Meetings: Not on file    Marital Status: Not on file   Intimate Partner Violence:     Fear of Current or Ex-Partner: Not on file    Emotionally Abused: Not on file    Physically Abused: Not on file    Sexually Abused: Not on file   Housing Stability:     Unable to Pay for Housing in the Last Year: Not on file    Number of [place] in the Last Year: Not on file    Unstable Housing in the Last Year: Not on file          Physical Exam  Constitutional: Appearance: Normal appearance. HENT:      Head: Normocephalic. Eyes:      Extraocular Movements: Extraocular movements intact. Pulmonary:      Effort: Pulmonary effort is normal.   Neurological:      Mental Status: She is alert and oriented to person, place, and time. Psychiatric:         Mood and Affect: Mood normal.         Behavior: Behavior normal.         Thought Content: Thought content normal.         Judgment: Judgment normal.           Other pertinent observable physical exam findings-     ASSESSMENT/PLAN:  Patient Active Problem List    Diagnosis Date Noted    Abnormal glucose tolerance test (GTT) during pregnancy, antepartum 11/09/2021     1 hr GTT: 146  3 hr GTT: **      22 weeks gestation of pregnancy 09/21/2021    celestone 9/21/21 & 9/22/21 09/21/2021 9/21/21 Patient seen and evaluated for no residual cervix at MFM, evaluated on L&D, visually dilated w/ visible bag of water. Patient elected for outpatient celestone w/ first shot today. Currently asymptomatic.        No residual cervix noted on MFM US 9/21/21 09/17/2021    Hx of LEEP (2007) 09/14/2021     For HGSIL on Pap       Hx of ventral hernia repair w/ mesh (2019) 09/14/2021     Diagnostic laparoscopy with open ventral hernia repair with mesh (1/16/19)      Medication exposure during first trimester of pregnancy 07/09/2021     Clonazepam initally  Zoloft      Hx of PreE w/ SF's (G2) 06/07/2021     Baseline PreE labs WNL    New onset hypertension and thrombocytopenia (Plt 90,000)  Received Mag x24 hours       Hx of GDMA1 (G2) 06/07/2021     Early 1 hr GTT **      Atypical glandular cells on cervical Pap smear 10/12/2020     EMB & [place] 10/12/20      Postpartum uterine atony without hemorrhage (G2) 03/19/2018     IOL 2/2 PreEclampsia (new onset HTN and thrombocytopenia of platelets 04,871)  Patient on magnesium during induction  Uterine atony during delivery - received Hemabate and then 600mcg buccal cytotec afterwards,, EBL 600mL      Rh+/RI/GBS neg 03/14/2018       Discussed kick counts  Discussed s/sx that should prompt call to the office/visit to hospital  Pt to follow up 2 wks    Lafayette Babinski is a 29 y.o. female being evaluated by a Virtual Visit (video visit) encounter to address concerns as mentioned above. A caregiver was present when appropriate. Due to this being a TeleHealth encounter (During PKA-06 public health emergency), evaluation of the following organ systems was limited: Vitals/Constitutional/EENT/Resp/CV/GI//MS/Neuro/Skin/Heme-Lymph-Imm. Pursuant to the emergency declaration under the [address] [place] [address] and the "[employer] and [employer] Act, this Virtual Visit was conducted with patient's (and/or legal guardian's) consent, to reduce the patient's risk of exposure to COVID-19 and provide necessary medical care. The patient (and/or legal guardian) has also been advised to contact this office for worsening conditions or problems, and seek emergency medical treatment and/or call 911 if deemed necessary. Services were provided through a video synchronous discussion virtually to substitute for in-person clinic visit. Patient and provider were located at their individual homes. More than 50% of this 15 minute visit was spent counseling the patient. Penelope Gutierrez MD      An electronic signature was used to authenticate this note.

## 2021-11-30 ENCOUNTER — ROUTINE PRENATAL (OUTPATIENT)
Dept: PERINATAL CARE | Age: 36
End: 2021-11-30
Payer: COMMERCIAL

## 2021-11-30 VITALS
HEIGHT: 62 IN | SYSTOLIC BLOOD PRESSURE: 113 MMHG | BODY MASS INDEX: 26.86 KG/M2 | TEMPERATURE: 97.2 F | RESPIRATION RATE: 16 BRPM | DIASTOLIC BLOOD PRESSURE: 70 MMHG | WEIGHT: 145.94 LBS | HEART RATE: 92 BPM

## 2021-11-30 DIAGNOSIS — Z03.73 SUSPECTED FETAL ANOMALY NOT FOUND: ICD-10-CM

## 2021-11-30 DIAGNOSIS — O35.EXX0 ENCOUNTER FOR REPEAT ULTRASOUND OF FETAL PYELECTASIS, ANTEPARTUM, SINGLE OR UNSPECIFIED FETUS: Primary | ICD-10-CM

## 2021-11-30 DIAGNOSIS — O35.8XX0 SUSPECTED DAMAGE TO FETUS FROM DISEASE IN MOTHER, ANTEPARTUM CONDITION, SINGLE OR UNSPECIFIED FETUS: ICD-10-CM

## 2021-11-30 DIAGNOSIS — O35.2XX0 HEREDITARY FAMILIAL DISEASE AFFECTING MANAGEMENT OF MOTHER AND POSSIBLY AFFECTING FETUS, ANTEPARTUM, SINGLE OR UNSPECIFIED FETUS: ICD-10-CM

## 2021-11-30 DIAGNOSIS — O09.523 MULTIGRAVIDA OF ADVANCED MATERNAL AGE IN THIRD TRIMESTER: ICD-10-CM

## 2021-11-30 DIAGNOSIS — Z13.89 ENCOUNTER FOR ROUTINE SCREENING FOR MALFORMATION USING ULTRASONICS: ICD-10-CM

## 2021-11-30 DIAGNOSIS — Z3A.32 32 WEEKS GESTATION OF PREGNANCY: ICD-10-CM

## 2021-11-30 PROCEDURE — 76816 OB US FOLLOW-UP PER FETUS: CPT | Performed by: OBSTETRICS & GYNECOLOGY

## 2021-11-30 PROCEDURE — 76819 FETAL BIOPHYS PROFIL W/O NST: CPT | Performed by: OBSTETRICS & GYNECOLOGY

## 2021-12-05 NOTE — PROGRESS NOTES
Radhames Fuentes is a  @ [address] [facility] who presents for NIRAV visit. She denies any LOF, VB.  + FM. She was having contractions this weekend every 5-10 min and she is feeling lots of pressure. She says that her ctxs have slowed down however. She did receive steroids at 22 wks previously. She denies any fevers/chills, SOB, cough, sore throat, loss of taste/smell or sick contacts. She denies any HA, RUQ pain or loss of vision. O:  Vitals:    21 0900   BP: 130/78   Pulse: 103     Gen: NAD  Abd: soft, nontender, gravid  Ext:  no edema      BP: 130/78  Weight: 149 lb (67.6 kg)  Pulse: 103  Patient Position: Sitting  Fundal Height (cm): 32 cm  Fetal Heart Rate: 135  Movement: Present  Presentation: Vertex  Dilation (cm): 3  Effacement: 70  Station: -2    A/P:  Patient Active Problem List    Diagnosis Date Noted    SARS-CoV-2 positive 2021 positive test      Abnormal glucose tolerance test (GTT) during pregnancy, antepartum 2021     1 hr GTT: 146  3 hr GTT: **      22 weeks gestation of pregnancy 2021    celestone 21 & 2021 Patient seen and evaluated for no residual cervix at M, evaluated on L&D, visually dilated w/ visible bag of water. Patient elected for outpatient celestone w/ first shot today. Currently asymptomatic.        No residual cervix noted on MFM US 2021    Hx of LEEP () 2021     For HGSIL on Pap       Hx of ventral hernia repair w/ mesh (2019) 2021     Diagnostic laparoscopy with open ventral hernia repair with mesh (19)      Medication exposure during first trimester of pregnancy 2021     Clonazepam initally  Zoloft      Hx of PreE w/ SF's (G2) 2021     Baseline PreE labs WNL    New onset hypertension and thrombocytopenia (Plt 90,000)  Received Mag x24 hours       Hx of GDMA1 (G2) 2021     Early 1 hr GTT **      Atypical glandular cells on cervical Pap smear 10/12/2020     EMB & [place] 10/12/20      Postpartum uterine atony without hemorrhage (G2) 03/19/2018     IOL 2/2 PreEclampsia (new onset HTN and thrombocytopenia of platelets 91,986)  Patient on magnesium during induction  Uterine atony during delivery - received Hemabate and then 600mcg buccal cytotec afterwards,, EBL 600mL      Rh+/RI/GBS neg 03/14/2018     Discussed updated COVID precautions and policies, including but not limited to outpatient testing 3-4 days prior to scheduled delivery or universal rapid screening on L&D for unscheduled delivery unless fully vaccinated. Reviewed updated visitor policy. Encouraged social distancing and appropriate hand washing/hygiene practices. Reviewed symptoms suspicious for COVID infection. Discussed that ACOG, SMFM, and the CDC recommend to not withold immunization in pregnant and breastfeeding women who meet criteria for receipt of the vaccine based on the ACIP recommended priority groups. All questions answered. Patient vocalized understanding. Pt meliton occ with 3 cm cervix, will give 1st shot of rescue course today and 2nd tomorrow.    Pt to do her 3 hr GTT tomorrow  Discussed s/sx that should prompt call to the office  Discussed kick counts  RTC in 2 wks    Elvan Schirmer, MD

## 2021-12-06 ENCOUNTER — ROUTINE PRENATAL (OUTPATIENT)
Dept: OBGYN CLINIC | Age: 36
End: 2021-12-06
Payer: COMMERCIAL

## 2021-12-06 VITALS
HEART RATE: 103 BPM | DIASTOLIC BLOOD PRESSURE: 78 MMHG | WEIGHT: 149 LBS | BODY MASS INDEX: 27.25 KG/M2 | SYSTOLIC BLOOD PRESSURE: 130 MMHG

## 2021-12-06 DIAGNOSIS — O34.33 PREMATURE CERVICAL DILATION IN THIRD TRIMESTER: Primary | ICD-10-CM

## 2021-12-06 DIAGNOSIS — O09.92 HIGH-RISK PREGNANCY IN SECOND TRIMESTER: ICD-10-CM

## 2021-12-06 DIAGNOSIS — Z34.93 PRENATAL CARE IN THIRD TRIMESTER: ICD-10-CM

## 2021-12-06 PROCEDURE — 96372 THER/PROPH/DIAG INJ SC/IM: CPT | Performed by: OBSTETRICS & GYNECOLOGY

## 2021-12-06 PROCEDURE — 0502F SUBSEQUENT PRENATAL CARE: CPT | Performed by: OBSTETRICS & GYNECOLOGY

## 2021-12-06 RX ORDER — BETAMETHASONE SODIUM PHOSPHATE AND BETAMETHASONE ACETATE 3; 3 MG/ML; MG/ML
12 INJECTION, SUSPENSION INTRA-ARTICULAR; INTRALESIONAL; INTRAMUSCULAR; SOFT TISSUE ONCE
Status: COMPLETED | OUTPATIENT
Start: 2021-12-06 | End: 2021-12-06

## 2021-12-06 RX ADMIN — BETAMETHASONE SODIUM PHOSPHATE AND BETAMETHASONE ACETATE 12 MG: 3; 3 INJECTION, SUSPENSION INTRA-ARTICULAR; INTRALESIONAL; INTRAMUSCULAR; SOFT TISSUE at 16:03

## 2021-12-06 NOTE — PROGRESS NOTES
After obtaining consent, and per orders of Dr. Yina Benito, injection of celestone given in Right upper quad. gluteus by Luba Arthur. Patient instructed to remain in clinic for 20 minutes afterwards, and to report any adverse reaction to me immediately.

## 2021-12-07 ENCOUNTER — OFFICE VISIT (OUTPATIENT)
Dept: OBGYN CLINIC | Age: 36
End: 2021-12-07
Payer: COMMERCIAL

## 2021-12-07 VITALS — WEIGHT: 147.8 LBS | SYSTOLIC BLOOD PRESSURE: 131 MMHG | BODY MASS INDEX: 27.03 KG/M2 | DIASTOLIC BLOOD PRESSURE: 71 MMHG

## 2021-12-07 DIAGNOSIS — O34.33 PREMATURE CERVICAL DILATION IN THIRD TRIMESTER: Primary | ICD-10-CM

## 2021-12-07 PROCEDURE — 96372 THER/PROPH/DIAG INJ SC/IM: CPT | Performed by: OBSTETRICS & GYNECOLOGY

## 2021-12-07 RX ORDER — BETAMETHASONE SODIUM PHOSPHATE AND BETAMETHASONE ACETATE 3; 3 MG/ML; MG/ML
12 INJECTION, SUSPENSION INTRA-ARTICULAR; INTRALESIONAL; INTRAMUSCULAR; SOFT TISSUE ONCE
Status: COMPLETED | OUTPATIENT
Start: 2021-12-07 | End: 2021-12-07

## 2021-12-07 RX ADMIN — BETAMETHASONE SODIUM PHOSPHATE AND BETAMETHASONE ACETATE 12 MG: 3; 3 INJECTION, SUSPENSION INTRA-ARTICULAR; INTRALESIONAL; INTRAMUSCULAR; SOFT TISSUE at 09:30

## 2021-12-07 NOTE — PROGRESS NOTES
After obtaining consent, and per orders of Dr. Lotus Lim, injection of Celestone given in Left upper quad. gluteus by Pearl Silverman. Patient instructed to remain in clinic for 20 minutes afterwards, and to report any adverse reaction to me immediately.

## 2021-12-21 ENCOUNTER — HOSPITAL ENCOUNTER (OUTPATIENT)
Age: 36
Setting detail: SPECIMEN
Discharge: HOME | End: 2021-12-21

## 2021-12-21 ENCOUNTER — HOSPITAL ENCOUNTER (OUTPATIENT)
Facility: CLINIC | Age: 36
Discharge: HOME | End: 2021-12-21
Payer: COMMERCIAL

## 2021-12-21 ENCOUNTER — ROUTINE PRENATAL (OUTPATIENT)
Dept: OBGYN CLINIC | Age: 36
End: 2021-12-21

## 2021-12-21 VITALS
HEART RATE: 98 BPM | BODY MASS INDEX: 27.25 KG/M2 | WEIGHT: 149 LBS | DIASTOLIC BLOOD PRESSURE: 82 MMHG | SYSTOLIC BLOOD PRESSURE: 135 MMHG

## 2021-12-21 DIAGNOSIS — O99.810 ABNORMAL GLUCOSE TOLERANCE TEST (GTT) DURING PREGNANCY, ANTEPARTUM: ICD-10-CM

## 2021-12-21 DIAGNOSIS — Z34.92 PRENATAL CARE, SECOND TRIMESTER: Primary | ICD-10-CM

## 2021-12-21 DIAGNOSIS — Z34.92 PRENATAL CARE, SECOND TRIMESTER: ICD-10-CM

## 2021-12-21 LAB
ESTIMATED AVERAGE GLUCOSE: 105 MG/DL
HBA1C MFR BLD: 5.3 % (ref 4–6)

## 2021-12-21 PROCEDURE — 36415 COLL VENOUS BLD VENIPUNCTURE: CPT

## 2021-12-21 PROCEDURE — 0502F SUBSEQUENT PRENATAL CARE: CPT | Performed by: OBSTETRICS & GYNECOLOGY

## 2021-12-21 PROCEDURE — 83036 HEMOGLOBIN GLYCOSYLATED A1C: CPT

## 2021-12-21 NOTE — PROGRESS NOTES
Florin Milian is a  @ 35w0d who presents for NIRAV visit. She denies LOF, VB or Ctxs.  + FM. She is having some \"lightening crotch\" every time baby moves. She does feel occ ctxs, but nothing regular. She denies any fevers/chills, SOB, cough, sore throat, loss of taste/smell or sick contacts. She denies any RUQ pain, vision changes or HA.     O:  Vitals:    21 0903   BP: 135/82   Pulse: 98     Gen: NAD  Abd: soft, nontender, gravid  Ext:  no edema      BP: 135/82  Weight: 149 lb (67.6 kg)  Pulse: 98  Patient Position: Sitting  Fundal Height (cm): 35 cm  Fetal Heart Rate: 140  Movement: Present  Presentation: Vertex  Dilation (cm): 3  Effacement: 80  Station: -2    A/P:  Patient Active Problem List    Diagnosis Date Noted    Premature cervical dilation in third trimester 2021    SARS-CoV-2 positive 2021 positive test      Abnormal glucose tolerance test (GTT) during pregnancy, antepartum 2021     1 hr GTT: 146  3 hr GTT: **      22 weeks gestation of pregnancy 2021    celestone 21 & 21, Rescue 21 & 2021 Patient seen and evaluated for no residual cervix at MFM, evaluated on L&D, visually dilated w/ visible bag of water. Patient elected for outpatient celestone w/ first shot today. Currently asymptomatic.    21 Pt having occ ctxs and feeling some pressure, found to be 3 cm      No residual cervix noted on MFM US 2021    Hx of LEEP () 2021     For HGSIL on Pap       Hx of ventral hernia repair w/ mesh () 2021     Diagnostic laparoscopy with open ventral hernia repair with mesh (19)      Medication exposure during first trimester of pregnancy 2021     Clonazepam initally  Zoloft      Hx of PreE w/ SF's (G2) 2021     Baseline PreE labs WNL    New onset hypertension and thrombocytopenia (Plt 90,000)  Received Mag x24 hours       Hx of GDMA1 (G2) 2021     Early 1 hr GTT **

## 2021-12-24 ENCOUNTER — HOSPITAL ENCOUNTER (INPATIENT)
Age: 36
LOS: 3 days | Discharge: HOME | End: 2021-12-27
Attending: OBSTETRICS & GYNECOLOGY | Admitting: OBSTETRICS & GYNECOLOGY
Payer: COMMERCIAL

## 2021-12-24 ENCOUNTER — TELEPHONE (OUTPATIENT)
Dept: OBGYN | Age: 36
End: 2021-12-24

## 2021-12-24 DIAGNOSIS — Z3A.35 35 WEEKS GESTATION OF PREGNANCY: Primary | ICD-10-CM

## 2021-12-24 PROBLEM — O09.90 HIGH-RISK PREGNANCY, UNSPECIFIED TRIMESTER: Status: ACTIVE | Noted: 2021-12-24

## 2021-12-24 LAB
ABO/RH: NORMAL
ABSOLUTE EOS #: 0.06 K/UL (ref 0–0.44)
ABSOLUTE IMMATURE GRANULOCYTE: 0.07 K/UL (ref 0–0.3)
ABSOLUTE LYMPH #: 1.97 K/UL (ref 1.1–3.7)
ABSOLUTE MONO #: 0.61 K/UL (ref 0.1–1.2)
AMPHETAMINE SCREEN URINE: NEGATIVE
ANTIBODY SCREEN: NEGATIVE
ARM BAND NUMBER: NORMAL
BARBITURATE SCREEN URINE: NEGATIVE
BASOPHILS # BLD: 0 % (ref 0–2)
BASOPHILS ABSOLUTE: <0.03 K/UL (ref 0–0.2)
BENZODIAZEPINE SCREEN, URINE: NEGATIVE
BUPRENORPHINE URINE: NORMAL
CANNABINOID SCREEN URINE: NEGATIVE
COCAINE METABOLITE, URINE: NEGATIVE
CULTURE: NORMAL
DIFFERENTIAL TYPE: ABNORMAL
EOSINOPHILS RELATIVE PERCENT: 1 % (ref 1–4)
EXPIRATION DATE: NORMAL
HCT VFR BLD CALC: 35.7 % (ref 36.3–47.1)
HEMOGLOBIN: 12.1 G/DL (ref 11.9–15.1)
IMMATURE GRANULOCYTES: 1 %
LYMPHOCYTES # BLD: 21 % (ref 24–43)
Lab: NORMAL
MCH RBC QN AUTO: 29.9 PG (ref 25.2–33.5)
MCHC RBC AUTO-ENTMCNC: 33.9 G/DL (ref 28.4–34.8)
MCV RBC AUTO: 88.1 FL (ref 82.6–102.9)
MDMA URINE: NORMAL
METHADONE SCREEN, URINE: NEGATIVE
METHAMPHETAMINE, URINE: NORMAL
MONOCYTES # BLD: 7 % (ref 3–12)
NRBC AUTOMATED: 0 PER 100 WBC
OPIATES, URINE: NEGATIVE
OXYCODONE SCREEN URINE: NEGATIVE
PDW BLD-RTO: 13.6 % (ref 11.8–14.4)
PHENCYCLIDINE, URINE: NEGATIVE
PLATELET # BLD: 164 K/UL (ref 138–453)
PLATELET ESTIMATE: ABNORMAL
PMV BLD AUTO: 10.6 FL (ref 8.1–13.5)
PROPOXYPHENE, URINE: NORMAL
RBC # BLD: 4.05 M/UL (ref 3.95–5.11)
RBC # BLD: ABNORMAL 10*6/UL
SARS-COV-2, RAPID: NOT DETECTED
SEG NEUTROPHILS: 70 % (ref 36–65)
SEGMENTED NEUTROPHILS ABSOLUTE COUNT: 6.46 K/UL (ref 1.5–8.1)
SPECIMEN DESCRIPTION: NORMAL
SPECIMEN DESCRIPTION: NORMAL
T. PALLIDUM, IGG: NONREACTIVE
TEST INFORMATION: NORMAL
TRICYCLIC ANTIDEPRESSANTS, UR: NORMAL
WBC # BLD: 9.2 K/UL (ref 3.5–11.3)
WBC # BLD: ABNORMAL 10*3/UL

## 2021-12-24 PROCEDURE — 86900 BLOOD TYPING SEROLOGIC ABO: CPT

## 2021-12-24 PROCEDURE — 1220000000 HC SEMI PRIVATE OB R&B

## 2021-12-24 PROCEDURE — 86901 BLOOD TYPING SEROLOGIC RH(D): CPT

## 2021-12-24 PROCEDURE — 87635 SARS-COV-2 COVID-19 AMP PRB: CPT

## 2021-12-24 PROCEDURE — 6360000002 HC RX W HCPCS: Performed by: STUDENT IN AN ORGANIZED HEALTH CARE EDUCATION/TRAINING PROGRAM

## 2021-12-24 PROCEDURE — 85025 COMPLETE CBC W/AUTO DIFF WBC: CPT

## 2021-12-24 PROCEDURE — 86780 TREPONEMA PALLIDUM: CPT

## 2021-12-24 PROCEDURE — 80307 DRUG TEST PRSMV CHEM ANLYZR: CPT

## 2021-12-24 PROCEDURE — 2580000003 HC RX 258: Performed by: STUDENT IN AN ORGANIZED HEALTH CARE EDUCATION/TRAINING PROGRAM

## 2021-12-24 PROCEDURE — 86850 RBC ANTIBODY SCREEN: CPT

## 2021-12-24 RX ORDER — SODIUM CHLORIDE, SODIUM LACTATE, POTASSIUM CHLORIDE, AND CALCIUM CHLORIDE .6; .31; .03; .02 G/100ML; G/100ML; G/100ML; G/100ML
1000 INJECTION, SOLUTION INTRAVENOUS PRN
Status: DISCONTINUED | OUTPATIENT
Start: 2021-12-24 | End: 2021-12-25

## 2021-12-24 RX ORDER — ONDANSETRON 2 MG/ML
4 INJECTION INTRAMUSCULAR; INTRAVENOUS EVERY 6 HOURS PRN
Status: DISCONTINUED | OUTPATIENT
Start: 2021-12-24 | End: 2021-12-25

## 2021-12-24 RX ORDER — SODIUM CHLORIDE, SODIUM LACTATE, POTASSIUM CHLORIDE, AND CALCIUM CHLORIDE .6; .31; .03; .02 G/100ML; G/100ML; G/100ML; G/100ML
500 INJECTION, SOLUTION INTRAVENOUS PRN
Status: DISCONTINUED | OUTPATIENT
Start: 2021-12-24 | End: 2021-12-25

## 2021-12-24 RX ORDER — ACETAMINOPHEN 500 MG
1000 TABLET ORAL EVERY 6 HOURS PRN
Status: DISCONTINUED | OUTPATIENT
Start: 2021-12-24 | End: 2021-12-25

## 2021-12-24 RX ORDER — SODIUM CHLORIDE 0.9 % (FLUSH) 0.9 %
5-40 SYRINGE (ML) INJECTION PRN
Status: DISCONTINUED | OUTPATIENT
Start: 2021-12-24 | End: 2021-12-25

## 2021-12-24 RX ORDER — SEVOFLURANE 250 ML/250ML
1 LIQUID RESPIRATORY (INHALATION) CONTINUOUS PRN
Status: DISCONTINUED | OUTPATIENT
Start: 2021-12-24 | End: 2021-12-25

## 2021-12-24 RX ORDER — SODIUM CHLORIDE, SODIUM LACTATE, POTASSIUM CHLORIDE, CALCIUM CHLORIDE 600; 310; 30; 20 MG/100ML; MG/100ML; MG/100ML; MG/100ML
INJECTION, SOLUTION INTRAVENOUS CONTINUOUS
Status: DISCONTINUED | OUTPATIENT
Start: 2021-12-24 | End: 2021-12-25

## 2021-12-24 RX ORDER — DIPHENHYDRAMINE HCL 25 MG
25 TABLET ORAL EVERY 4 HOURS PRN
Status: DISCONTINUED | OUTPATIENT
Start: 2021-12-24 | End: 2021-12-25

## 2021-12-24 RX ORDER — LIDOCAINE HYDROCHLORIDE 10 MG/ML
30 INJECTION, SOLUTION EPIDURAL; INFILTRATION; INTRACAUDAL; PERINEURAL PRN
Status: DISCONTINUED | OUTPATIENT
Start: 2021-12-24 | End: 2021-12-25

## 2021-12-24 RX ORDER — SODIUM CHLORIDE 9 MG/ML
25 INJECTION, SOLUTION INTRAVENOUS PRN
Status: DISCONTINUED | OUTPATIENT
Start: 2021-12-24 | End: 2021-12-25

## 2021-12-24 RX ORDER — SODIUM CHLORIDE 0.9 % (FLUSH) 0.9 %
5-40 SYRINGE (ML) INJECTION EVERY 12 HOURS SCHEDULED
Status: DISCONTINUED | OUTPATIENT
Start: 2021-12-24 | End: 2021-12-25

## 2021-12-24 RX ADMIN — SODIUM CHLORIDE, POTASSIUM CHLORIDE, SODIUM LACTATE AND CALCIUM CHLORIDE: 600; 310; 30; 20 INJECTION, SOLUTION INTRAVENOUS at 15:20

## 2021-12-24 RX ADMIN — SODIUM CHLORIDE, POTASSIUM CHLORIDE, SODIUM LACTATE AND CALCIUM CHLORIDE: 600; 310; 30; 20 INJECTION, SOLUTION INTRAVENOUS at 23:01

## 2021-12-24 RX ADMIN — Medication 1 MILLI-UNITS/MIN: at 15:30

## 2021-12-24 ASSESSMENT — PAIN DESCRIPTION - PAIN TYPE: TYPE: ACUTE PAIN

## 2021-12-24 ASSESSMENT — PAIN DESCRIPTION - ONSET: ONSET: ON-GOING

## 2021-12-24 ASSESSMENT — PAIN DESCRIPTION - FREQUENCY: FREQUENCY: INTERMITTENT

## 2021-12-24 ASSESSMENT — PAIN SCALES - GENERAL: PAINLEVEL_OUTOF10: 4

## 2021-12-24 ASSESSMENT — PAIN DESCRIPTION - DESCRIPTORS: DESCRIPTORS: CRAMPING

## 2021-12-24 ASSESSMENT — PAIN DESCRIPTION - PROGRESSION: CLINICAL_PROGRESSION: GRADUALLY WORSENING

## 2021-12-24 ASSESSMENT — PAIN - FUNCTIONAL ASSESSMENT: PAIN_FUNCTIONAL_ASSESSMENT: ACTIVITIES ARE NOT PREVENTED

## 2021-12-24 ASSESSMENT — PAIN DESCRIPTION - LOCATION: LOCATION: ABDOMEN

## 2021-12-24 NOTE — TELEPHONE ENCOUNTER
RESIDENT TELEPHONE NOTE     Resident received [employer] message for patient. Spoke to patient on the phone, she reports leakage of fluid. States that yesterday she felt a gush of fluid and continued to feel like she was leaking for 5-6 hours. She reports that last night she also felt like she was leaking fluid, and has had to change her underwear 3-4 times today. States the fluid does not have a smell and appears clear. Denies vaginal bleeding or contractions. Patient states she has good fetal movement. Per patient she was 3 cm dilated in the office on Tuesday. Advised patient to come into [facility] to be evaluated for  prelabor rupture of membranes. Patient understands and agreeable to plan.        Senior resident updated and agreeable to plan     Uche Metz DO  OB/GYN Resident  [facility]  2021 12:06 PM

## 2021-12-24 NOTE — H&P
[facility]    Date: [facility]       Time: 1:21 PM   Patient Name: Louie Khan     Patient : 1985  Room/Bed: [place] [address]    Admission Date/Time: 2021  1:14 PM      CC: Leakage of Fluid     HPI: Louie Khan is a 29 y.o. [id number] at 35w3d who presents with c/o leakage of fluid. Patient states she felt a gush of fluid yesterday at approximately 0800  and continued to feel like she was leaking for 5-6 hours. Today she feels like she is still leaking and has had to change her underwear multiple times. She denies any abdominal pain or cramping. States the fluid is odorless and she does not believe she is peeing. She called the  service earlier and was encouraged to come in to be evaluated. She is still complaining of feeling like she is leaking fluid The patient reports fetal movement is present, denies contractions, denies vaginal bleeding. DATING:  LMP: Patient's last menstrual period was 2021.   Estimated Date of Delivery: 22   Based on: LMP, early ultrasound, at 6 6/7 weeks GA    PREGNANCY RISK FACTORS:  Patient Active Problem List   Diagnosis    Rh+/RI/GBS neg    Postpartum uterine atony without hemorrhage (G2)    Atypical glandular cells on cervical Pap smear    Hx of PreE w/ SF's (G2)    Hx of GDMA1 (G2)    Medication exposure during first trimester of pregnancy    Hx of LEEP (2007)    Hx of ventral hernia repair w/ mesh (2019)    No residual cervix noted on MFM US 21    22 weeks gestation of pregnancy    celestone 21 & 21, Rescue 21 & 21    Abnormal glucose tolerance test (GTT) during pregnancy, antepartum    SARS-CoV-2 positive    Premature cervical dilation in third trimester        Steroids Given In This Pregnancy:  yes, date: 21, 21, 21, 21      REVIEW OF SYSTEMS:   Constitutional: negative fever, negative chills, negative weight changes   HEENT: negative visual disturbances, negative headaches, negative dizziness, negative hearing loss  Breast: Negative breast abnormalities, negative breast lumps, negative nipple discharge  Respiratory: negative dyspnea, negative cough, negative SOB  Cardiovascular: negative chest pain,  negative palpitations, negative arrhythmia, negative syncope   Gastrointestinal: negative abdominal pain, negative RUQ pain, negative N/V, negative diarrhea, negative constipation, negative bowel changes, negative heartburn   Genitourinary: + LOF, negative dysuria, negative hematuria, negative urinary incontinence, negative vaginal discharge, negative vaginal bleeding or spotting  Dermatological: negative rash, negative pruritis, negative mole or other skin changes  Hematologic: negative bruising  Immunologic/Lymphatic: negative recent illness, negative recent sick contact  Musculoskeletal: negative back pain, negative myalgias, negative arthralgias  Neurological:  negative dizziness, negative migraines, negative seizures, negative weakness  Behavior/Psych: negative depression, negative anxiety, negative SI, negative HI    OBSTETRICAL HISTORY:   OB History    Para Term  AB Living   4 2 2 0 1 2   SAB IAB Ectopic Molar Multiple Live Births   1 0 0 0 0 2      # Outcome Date GA Lbr Tulio/2nd Weight Sex Delivery Anes PTL Lv   4 Current            3 SAB /     SAB      2 Term 03/15/18 37w4d / 00:27 6 lb 13.5 oz (3.105 kg) F Vag-Spont None N REED      Complications: Preeclampsia, Postpartum uterine atony without hemorrhage      Name: Malva Actis: 8  Apgar5: 9   1 Term 03/10/06   7 lb 1 oz (3.204 kg) M Vag-Spont   REED       PAST MEDICAL HISTORY:   has a past medical history of Abnormal Pap smear, Adopted, Asthma, and Tachycardia. PAST SURGICAL HISTORY:   has a past surgical history that includes LEEP (2007); Dental surgery;  Wrist surgery; laparoscopy (2019); and ventral hernia repair (N/A, 2019). ALLERGIES:  is allergic to bactrim [sulfamethoxazole-trimethoprim]. MEDICATIONS:  Prior to Admission medications    Medication Sig Start Date End Date Taking? Authorizing Provider   progesterone (PROMETRIUM) 200 MG CAPS capsule Take 1 capsule by mouth daily DO NOT TAKE ORALLY, PLACE VAGINALLY 21   Daryl Meigs, DO   aspirin EC 81 MG EC tablet Take 1 tablet by mouth daily 21   Daryl Meigs, DO   sertraline (ZOLOFT) 50 MG tablet Take 1 tablet by mouth daily 21   Danya Penny MD   Prenatal Multivit-Min-Fe-FA (PRE-BLANQUITA PO) Take by mouth    Historical Provider, MD       FAMILY HISTORY:  family history is not on file. SOCIAL HISTORY:   reports that she has never smoked. She has never used smokeless tobacco. She reports previous alcohol use. She reports that she does not use drugs. VITALS:  There were no vitals filed for this visit.       PHYSICAL EXAM:  Fetal Heart Monitor:  Baseline Heart Rate 140, moderate variability, present accelerations, absent decelerations  [place]ley: contractions, none    General appearance:  no apparent distress, alert and cooperative  HEENT: head atraumatic, normocephalic, moist mucous membranes, trachea midline  Neurologic:  alert, oriented, normal speech, no focal findings or movement disorder noted  Lungs:  No increased work of breathing, good air exchange, clear to auscultation bilaterally, no crackles or wheezing  Heart:  regular rate and rhythm and no murmur, rubs, gallops  Abdomen:  soft, gravid, non-tender, no rebound, guarding, or rigidity, no RUQ or epigastric tenderness, no signs or symptoms of abruption, no signs or symptoms of chorioamnionitis  Extremities:  no calf tenderness, non edematous, no varicosities, full range of motion in all four extremities  Musculoskeletal: Gross strength equal and intact throughout, no gross abnormalities, range of motion normal in hips, knees, shoulders and spine, CVA tenderness: none  Psychiatric: Mood appropriate, normal affect   Rectal Exam: not indicated  Pelvic Exam:   Chaperone for Intimate Exam: Chaperone was present for entire exam, Chaperone Name: CASH Dickson  Sterile Speculum Exam:   Urethral meatus: normal appearing   Vulva: Normal hair distribution, normal appearing vulva, no masses, tenderness or lesions, normal clitoris   Vagina: Normal appearing vaginal mucosa without lesions, clear vaginal discharge noted in the posterior vault, no lacerations   Cervix: Normal appearing cervix without lesions, no lacerations or abnormal lesions visualized  Sterile Vaginal Exam:  Cervix: No cervical motion tenderness   Uterus: Is gravid, Normal size, shape, consistency and non-tender   Adnexa: Non-tender, no palpable masses  Cervix: 3 cm dilated, 50 % effaced, -2 station, mid position (out of 3 station), soft consistency, FETAL POSITION: Cephalic, Membranes ruptured, clear fluid,    Bishops Score: 8    DATA:  Membranes Ruptured: Yes: 12/23/21  Fern: positive  Nitrazine: Positive  Valsalva/Pooling: present  Vaginal Bleeding: absent    LIMITED BEDSIDE US:  Position: Cephalic  Placental Location: anterior  Fetal Heart Tones: Present  Fetal Movement: Present  Amniotic Fluid Index/Volume:  adequate 2x2 cm fluid pocket  Estimated Fetal Weight:  6 lbs 1 oz    PRENATAL LAB RESULTS:   Blood Type/Rh: B pos  Antibody Screen: negative  Hemoglobin, Hematocrit, Platelets: Hgb 26.2/XRQ 38.5/Plt 225  Rubella: immune  T.  Pallidum, IgG: non-reactive  Hepatitis B Surface Antigen: non-reactive   Hepatitis C Antibody: unknown   HIV: non-reactive   Sickle Cell Screen: NA  Gonorrhea: negative  Chlamydia: negative  Urine culture: negative, date: 6/7/21    1 hour Glucose Tolerance Test: 146  3 hour Glucose Tolerance Test: not done    Group B Strep: negative RV culture on 12/21/21  Cystic Fibrosis Screen: not available  First Trimester Screen: not available  MSAFP/Multiple Markers: normal  Non-Invasive Prenatal Testing: no aneuploidy  Anatomy US: right lateral placenta, 3VC, female gender, overall normal anatomy    ASSESSMENT & PLAN:  Gabriela Chahal is a 29 y.o. female [id number] at 35w3d IUP   - GBS negative / Rh positive / R immune   - No indication for GBS prophylaxis   - VSS, afebrile     Leakage of Fluid/PPROM   - Presents with possible rupture around 0730 on 12/23/21   - Patient states she feels like she has continued to leak fluid since this time   - SSE: +pooling, +ferning, +Nitrazine    - SVE 3/50/-1   - Patient is grossly ruptured with clear fluid    - Plan for induction for PPROM with rupture > 24 hrs ago   - ATSO Dr. Darius Calderon   - CBC, TPall, T&S   - UDS R/B/A discussed, consent obtained and in chart   - Cat 1 FHT, TOCO q non min   - SVE: 3/50/-1 (out of 3 station)   - SSE: + nitrazine, + pooling, + ferning, no bleeding   - BSUS: Cephalic, anterior placenta, EFW 6#1   - Mode of induction: pitocin     Fetal B/L renal pyelectasis (RSLVD)   - NIPT low risk    - Patient follows with MFM    - Resolved on 2 most recent MFM scans     Premature Cervical dilation    - Noted 9/21/21   - Patent evaluated on labor and delivery and found to be 1-2 cm dilated with visible bag of water    - No complaints at that time and elected to receive celestone outpatient   - Patient SVE in office 12/21 3 cm dilated    - Patient s/p Celestone x 2 with rescue course    - Compliant with progesterone 200 mg daily     S/p Celestone 9/21 & 9/22 with rescue dose 12/6 &12/7   - Patient seen on L&D on 9/21 for no residual cervix on MFM TVUS    - Cervix visibly dilated at that time   - was asymptomatic and received steroids     AMA   - NIPT wnl     Anxiety/Depression    - Currently on Zoloft 50 mg qd    - Denies SI/HI    Fetal exposure to SSRI (Zoloft)   - NIPT normal    - Fetal echo wnl     Elevated 1 hr GTT, No 3 hr GTT    Hx of PreE w/ SF    - Baseline PreE labs wnl    - ASA qd    Hx of GDMA1   - Elevated 1 hr GTT, no 3 hr completed     Hx LEEP (2007)    Hx Ventral Hernia repair w/ mesh    - Diagnostic lap with open ventral hernia repair    - 1/16/19    Hx KEVEN x 1     Fam Hx congential heart defect    - FOB nephew with septal defect, requiring surgical repair    - fetal echo wnl     BMI 27    Patient Active Problem List    Diagnosis Date Noted    Premature cervical dilation in third trimester 12/06/2021    SARS-CoV-2 positive 11/22/2021 11/16 positive test      Abnormal glucose tolerance test (GTT) during pregnancy, antepartum 11/09/2021     1 hr GTT: 146  3 hr GTT: **      22 weeks gestation of pregnancy 09/21/2021    celestone 9/21/21 & 9/22/21, Rescue 12/6/21 & 12/7/21 09/21/2021 9/21/21 Patient seen and evaluated for no residual cervix at MFM, evaluated on L&D, visually dilated w/ visible bag of water. Patient elected for outpatient celestone w/ first shot today. Currently asymptomatic.    12/6/21 Pt having occ ctxs and feeling some pressure, found to be 3 cm      No residual cervix noted on MFM US 9/21/21 09/17/2021    Hx of LEEP (2007) 09/14/2021     For HGSIL on Pap       Hx of ventral hernia repair w/ mesh (2019) 09/14/2021     Diagnostic laparoscopy with open ventral hernia repair with mesh (1/16/19)      Medication exposure during first trimester of pregnancy 07/09/2021     Clonazepam initally  Zoloft      Hx of PreE w/ SF's (G2) 06/07/2021     Baseline PreE labs WNL    New onset hypertension and thrombocytopenia (Plt 90,000)  Received Mag x24 hours       Hx of GDMA1 (G2) 06/07/2021     Early 1 hr GTT **      Atypical glandular cells on cervical Pap smear 10/12/2020     EMB & [place] 10/12/20      Postpartum uterine atony without hemorrhage (G2) 03/19/2018     IOL 2/2 PreEclampsia (new onset HTN and thrombocytopenia of platelets 10,038)  Patient on magnesium during induction  Uterine atony during delivery - received Hemabate and then 600mcg buccal cytotec afterwards,, EBL 600mL      Rh+/RI/GBS neg 03/14/2018       Plan discussed with  Yina Benito, who is agreeable. Steroids given this admission: No    Risks, benefits, alternatives and possible complications have been discussed in detail with the patient. Admission, and post admission procedures and expectations were discussed in detail. All questions were answered.     Attending's Name: Dr. Merlyn Batista DO  Ob/Gyn Resident  12/24/2021, 1:21 PM

## 2021-12-24 NOTE — DISCHARGE SUMMARY
Obstetric Discharge Summary  [facility]    Patient Name: Sonido Lira  Patient : 1985  Primary Care Physician: Robert Loja MD  Admit Date: 2021    Principal Diagnosis: IUP at 35w3d, admitted for  Prelabor rupture of membranes     Her pregnancy has been complicated by:   Patient Active Problem List   Diagnosis    Rh+/RI/GBS neg    Postpartum uterine atony without hemorrhage (G2)    Atypical glandular cells on cervical Pap smear    Hx of PreE w/ SF's (G2)    Hx of GDMA1 (G2)    Medication exposure during first trimester of pregnancy    Hx of LEEP (2007)    Hx of ventral hernia repair w/ mesh (2019)    No residual cervix noted on MFM US 21    celestone 21 & 21, Rescue 21 & 21    Abnormal glucose tolerance test (GTT) during pregnancy, antepartum    SARS-CoV-2 positive    Premature cervical dilation in third trimester    High-risk pregnancy, unspecified trimester     21 F Apg 8/9 Wt 6#1    Hx PPROM (G4)    Anxiety    Anemia    Gestational hypertension > PreE w/o SF       Infection Present?: No  Hospital Acquired: No    Surgical Operations & Procedures:  Analgesia: none  Delivery Type: Spontaneous Vaginal Delivery: See Labor and Delivery Summary   Laceration(s): Absent    Consultations: none    Pertinent Findings & Procedures:   Sonido Lira is a 29 y.o. female [id number] at 35w3d admitted for  pre-labor rupture of membranes; she received pitocin, nitrous, AROM (forebag - clr). She delivered by spontaneous vaginal a Live Born infant on 21. Information for the patient's :  Mary Gunning Girl Cristiano Lewis" [[id number]]   female   Birth Weight: 6 lb 1.2 oz (2.755 kg)       Apgars: 8 at 1 minute and 9 at 5 minutes. Postpartum course:   Patient felt heart palpitations and met criteria for gHTN. PreE labs were wnl. P:C 0.31. Patient met criteria for PreE without SF. EKG performed and was sinus tachy. Hgb resulted at 9.2. Patient discharged with PO iron. Course of patient: complicated by PreE without SF and anemia    Discharge to: Home    Readmission planned: no     Recommendations on Discharge:     Medications:      Medication List      START taking these medications    docusate sodium 100 MG capsule  Commonly known as: COLACE  Take 1 capsule by mouth 2 times daily     ferrous sulfate 325 (65 Fe) MG tablet  Commonly known as: IRON 325  Take 1 tablet by mouth 2 times daily     ibuprofen 600 MG tablet  Commonly known as: ADVIL;MOTRIN  Take 1 tablet by mouth every 6 hours as needed for Pain        CONTINUE taking these medications    aspirin EC 81 MG EC tablet  Take 1 tablet by mouth daily     PRE- PO     progesterone 200 MG Caps capsule  Commonly known as: PROMETRIUM  Take 1 capsule by mouth daily DO NOT TAKE ORALLY, PLACE VAGINALLY     sertraline 50 MG tablet  Commonly known as: ZOLOFT  Take 1 tablet by mouth daily           Where to Get Your Medications      You can get these medications from any pharmacy    Bring a paper prescription for each of these medications  · docusate sodium 100 MG capsule  · ferrous sulfate 325 (65 Fe) MG tablet  · ibuprofen 600 MG tablet          Activity: pelvic rest x 6 weeks  Diet: regular diet  Follow up: 1 week BP check     Condition on discharge: stable    Discharge date: 21    Cassandra Fierro DO  Ob/Gyn Resident    Comments:  Home care and follow-up care were reviewed. Pelvic rest, and birth control were reviewed. Signs and symptoms of mastitis and post partum depression were reviewed. The patient is to notify her physician if any of these occur. The patient was counseled on secondary smoke risks and the increased risk of sudden infant death syndrome and respiratory problems to her baby with exposure. She was counseled on various alternate recommendations to decrease the exposure to secondary smoke to her children.         Attending Physician Statement  I have discussed the care of Delio Sánchez, including pertinent history and exam findings,  with the resident. I have reviewed the key elements of all parts of the encounter with the resident. I agree with the assessment, plan and orders as documented by the resident.   (GE Modifier)    Electronically signed by Marissa Valerio MD at 10:46 AM 12/27/21

## 2021-12-25 PROBLEM — Z3A.22 22 WEEKS GESTATION OF PREGNANCY: Status: RESOLVED | Noted: 2021-09-21 | Resolved: 2021-12-25

## 2021-12-25 PROBLEM — Z87.59 HISTORY OF PRETERM PREMATURE RUPTURE OF MEMBRANES (PPROM): Status: ACTIVE | Noted: 2021-12-25

## 2021-12-25 PROCEDURE — 88307 TISSUE EXAM BY PATHOLOGIST: CPT

## 2021-12-25 PROCEDURE — 1220000000 HC SEMI PRIVATE OB R&B

## 2021-12-25 PROCEDURE — 6370000000 HC RX 637 (ALT 250 FOR IP): Performed by: STUDENT IN AN ORGANIZED HEALTH CARE EDUCATION/TRAINING PROGRAM

## 2021-12-25 PROCEDURE — 3E033VJ INTRODUCTION OF OTHER HORMONE INTO PERIPHERAL VEIN, PERCUTANEOUS APPROACH: ICD-10-PCS | Performed by: STUDENT IN AN ORGANIZED HEALTH CARE EDUCATION/TRAINING PROGRAM

## 2021-12-25 PROCEDURE — 6360000002 HC RX W HCPCS: Performed by: STUDENT IN AN ORGANIZED HEALTH CARE EDUCATION/TRAINING PROGRAM

## 2021-12-25 PROCEDURE — 7200000001 HC VAGINAL DELIVERY

## 2021-12-25 PROCEDURE — 59400 OBSTETRICAL CARE: CPT | Performed by: OBSTETRICS & GYNECOLOGY

## 2021-12-25 PROCEDURE — 10907ZC DRAINAGE OF AMNIOTIC FLUID, THERAPEUTIC FROM PRODUCTS OF CONCEPTION, VIA NATURAL OR ARTIFICIAL OPENING: ICD-10-PCS | Performed by: STUDENT IN AN ORGANIZED HEALTH CARE EDUCATION/TRAINING PROGRAM

## 2021-12-25 RX ORDER — DOCUSATE SODIUM 100 MG/1
100 CAPSULE, LIQUID FILLED ORAL 2 TIMES DAILY
Qty: 60 CAPSULE | Refills: 1 | Status: SHIPPED | OUTPATIENT
Start: 2021-12-25 | End: 2022-02-23

## 2021-12-25 RX ORDER — ONDANSETRON 2 MG/ML
4 INJECTION INTRAMUSCULAR; INTRAVENOUS EVERY 4 HOURS PRN
Status: DISCONTINUED | OUTPATIENT
Start: 2021-12-25 | End: 2021-12-27 | Stop reason: HOSPADM

## 2021-12-25 RX ORDER — LANOLIN 72 %
OINTMENT (GRAM) TOPICAL PRN
Status: DISCONTINUED | OUTPATIENT
Start: 2021-12-25 | End: 2021-12-27 | Stop reason: HOSPADM

## 2021-12-25 RX ORDER — ACETAMINOPHEN 500 MG
1000 TABLET ORAL EVERY 6 HOURS PRN
Status: DISCONTINUED | OUTPATIENT
Start: 2021-12-25 | End: 2021-12-27 | Stop reason: HOSPADM

## 2021-12-25 RX ORDER — DOCUSATE SODIUM 100 MG/1
100 CAPSULE, LIQUID FILLED ORAL 2 TIMES DAILY
Status: DISCONTINUED | OUTPATIENT
Start: 2021-12-25 | End: 2021-12-27 | Stop reason: HOSPADM

## 2021-12-25 RX ORDER — IBUPROFEN 600 MG/1
600 TABLET ORAL EVERY 6 HOURS PRN
Qty: 30 TABLET | Refills: 1 | Status: SHIPPED | OUTPATIENT
Start: 2021-12-25

## 2021-12-25 RX ORDER — BISACODYL 10 MG
10 SUPPOSITORY, RECTAL RECTAL DAILY PRN
Status: DISCONTINUED | OUTPATIENT
Start: 2021-12-25 | End: 2021-12-27 | Stop reason: HOSPADM

## 2021-12-25 RX ORDER — IBUPROFEN 600 MG/1
600 TABLET ORAL EVERY 6 HOURS
Status: DISCONTINUED | OUTPATIENT
Start: 2021-12-25 | End: 2021-12-27 | Stop reason: HOSPADM

## 2021-12-25 RX ORDER — SIMETHICONE 80 MG
80 TABLET,CHEWABLE ORAL EVERY 6 HOURS PRN
Status: DISCONTINUED | OUTPATIENT
Start: 2021-12-25 | End: 2021-12-27 | Stop reason: HOSPADM

## 2021-12-25 RX ADMIN — IBUPROFEN 600 MG: 600 TABLET, FILM COATED ORAL at 17:32

## 2021-12-25 RX ADMIN — DOCUSATE SODIUM 100 MG: 100 CAPSULE ORAL at 02:07

## 2021-12-25 RX ADMIN — DOCUSATE SODIUM 100 MG: 100 CAPSULE ORAL at 11:42

## 2021-12-25 RX ADMIN — IBUPROFEN 600 MG: 600 TABLET, FILM COATED ORAL at 11:41

## 2021-12-25 RX ADMIN — Medication 87.3 MILLI-UNITS/MIN: at 01:11

## 2021-12-25 RX ADMIN — Medication 166.7 ML: at 01:00

## 2021-12-25 RX ADMIN — ACETAMINOPHEN 1000 MG: 500 TABLET ORAL at 01:32

## 2021-12-25 RX ADMIN — IBUPROFEN 600 MG: 600 TABLET, FILM COATED ORAL at 02:05

## 2021-12-25 RX ADMIN — DOCUSATE SODIUM 100 MG: 100 CAPSULE ORAL at 20:04

## 2021-12-25 ASSESSMENT — PAIN SCALES - GENERAL
PAINLEVEL_OUTOF10: 4
PAINLEVEL_OUTOF10: 2
PAINLEVEL_OUTOF10: 4
PAINLEVEL_OUTOF10: 5

## 2021-12-25 NOTE — PROGRESS NOTES
Patient reports pain scale of  8/10. Patient states that she has used the nitrous with approximately 100% of her contractions. Patient reports nitrous is/is not helping with pain control.

## 2021-12-25 NOTE — L&D DELIVERY NOTE
Mother's Information    Labor Events     labor?: Yes  Rupture type: Spontaneous=SROM  Fluid color: Clear  Fluid odor: None     Mother Delivery Information    Surgical or Additional Est. Blood Loss (mL): 0 (View Only): Edit in Flowsheets   Combined Est. Blood Loss (mL): 0        Bomia, Baby Pending Latrell Echevarria [[id number]]    Labor Events     labor?: Yes   steroids?: Full Course  Cervical ripening date/time:     Rupture Identifier: Sac 1   Rupture date/time: 21 07:00:00   Rupture type: Spontaneous=SROM  Fluid color: Clear  Fluid odor: None  Induction: Oxytocin  Indications for induction: Premature ROM  Augmentation: None          Labor Event Times    Labor onset date/time: 21   Dilation complete date/time:      Start pushing:    Decision time (emergent ):        Anesthesia    Method: Nitrous Oxide     Assisted Delivery Details    Forceps attempted?: No     Document Additional Attempt       Document Additional Attempt             Shoulder Dystocia    Shoulder dystocia present?: No  Add Second Maneuver  Add Third Maneuver  Add Fourth Maneuver  Add Fifth Maneuver  Add Sixth Maneuver  Add Seventh Maneuver  Add Eighth Maneuver  Add Ninth Maneuver      Presentation    Presentation: Vertex      Information    Head delivery date/time: 2021 00:50:40   Changing the 's delivery date/time could affect patient care.:    Delivery date/time:  210   Delivery type: Vaginal, Spontaneous    Details:        Delivery Providers    Delivering clinician: Clara Pro MD   Provider Role    [facility]     Page Rogers, DO Lorna Tamez, RN       Cord    Vessels: 3 Vessels  Complications: None  Delayed cord clamping?: Yes  Cord clamped date/time: 2021 0052  Cord blood disposition: Lab  Gases sent?: Yes  Stem cell collection (by provider): Yes     Ama Measurements       Delivery Information    Surgical or additional est. blood loss (mL): 0 (View Only): Edit in Flowsheets   Combined est. blood loss (mL): 0          Vaginal Delivery Note  Department of Obstetrics and Gynecology  [facility]       Patient: Elsy Aguilar   : 1985  MRN: [id number]   Date of delivery: 21     Pre-operative Diagnosis: Elsy Aguilar [id number] at 35w4d, IUP   Pre-labor rupture of membranes  Elevated Blood pressure x 1  AMA  Elevated 1 hr GTT  Fetal exposure to SSRI  Hx of ventral hernia repair with mesh    Post-operative Diagnosis:  Living  female infant and same as above     Delivering Obstetrician & Assistant(s): Dr. Morena Claros; Ryan Bartlett, PGY3  Jesse Rai,     Infant Information:   Information for the patient's :  [facility] [[id number]]        Information for the patient's :  [facility] [[id number]]          Apgar scores: 8 at 1 minute and 9 at 5 minutes. Anesthesia:  none    Application and Delivery:    She was known to be GBS negative     The patient progressed well, became complete and felt the urge to push. After pushing with contractions the fetal head delivered Cephalic, right occiput anterior over an intact perineum, nuchal cord was not present. The anterior, then posterior shoulder delivered easily and atraumatically followed by the rest of the infant. Nose and mouth suctioned with bulb suction, infant was stimulated and dried. Cord was clamped and cut after one minute delayed cord clamping  and infant was placed on maternal abdomen, and attended by RN for evaluation. The delivery of the placenta was spontaneous and appeared intact, whole and that the umbilical cord had three vessels noted. Pitocin was started. The vagina was swept of all clots and debris. The perineum and vagina were evaluated no lacerations were found Mother and baby tolerated procedure well. Dr. Morena Claros was present for entire delivery.     Delivery Summary:       Specimen: placenta sent to pathology, cord blood and cord gases  Quantitative blood loss:  400ml immediately following delivery  Condition:  infant stable to general nursery and mother stable  Counts: instrument and sponge counts correct  Blood Type and Rh: 805 S DO Juanita  Ob/Gyn Resident  12/25/2021, 1:10 AM

## 2021-12-25 NOTE — PROGRESS NOTES
Labor Progress Note    Natalie Barth is a 29 y.o. female [id number] at 35w3d  The patient was seen and examined. Her pain is somewhat well controlled. Patient reports increasing pain and requested to be checked. She reports fetal movement is present, complains of contractions, complains of loss of fluid, denies vaginal bleeding. Denies s/s of preeclampsia including headache vision changes, difficulty breathing, chest pain, RUQ pain or worsening swelling of extremities. Vital Signs:  Vitals:    12/24/21 2200 12/24/21 2230 12/24/21 2300 12/24/21 2330   BP: (!) 144/71      Pulse: 82      Resp: 20      Temp: 98.3 °F (36.8 °C)      TempSrc: Oral      SpO2: 96% 98% 99% 98%   Weight:       Height:             FHT: 125, moderate variability, accelerations present, decelerations absent  Contractions: regular, every 3 minutes  Cervical Exam: 5 cm dilated, 90 effaced, -1 station  Pitocin: @ 12 mu/min    Chaperone for Exam   Chaperone was present.  Chaperone Shea CASTREJON        Membranes: Ruptured forebag clear fluid  Scalp Electrode in place: absent  Intrauterine Pressure Catheter in Place: absent    Interventions: SVE and AROM forebag     Assessment/Plan:  Natalie Barth is a 29 y.o. female [id number] at 35w3d admitted for SROM (0730) on 12/24   - GBS negative,   - No indication for GBS prophylaxis   - VSS, Afebrile    - IVF LR @ 125 cc/hr   - Pitocin per protocol   - SROM (0730) on 12/24   - AROM (forebag) @ 2345   - Nitrous   - CEFM/ TOCO   - Diet: CLD    Elevated BP   - Denies s/s PreE   - Continue to monitor    Attending updated and in agreement with plan    Jose Angel Rudd [place]  Ob/Gyn Resident  12/24/2021, 11:50 PM

## 2021-12-25 NOTE — PROGRESS NOTES
Pt educated on the use of self-administered  nitrous oxide for pain control and side effects. Pt educated on when and how to use the mask appropriately. Pt educated that she is the only person allowed to hold the mask to her face and that no one should prop the mask against her face. Pt also educated that she is the only person in the room allowed to use the mask. Pt informed that she cannot be out of bed without a trained support person with her. Pt completed a return demonstration of the use of nitrous oxide and all questions and concerns were addressed. RN verified the presence of a signed agreement form for the nitrous oxide. Pre-intervention VS, assessment, and FHT'st as charted. Nitrous oxide and oxygen at a 50-50 mix was initiated at 2030. RN remains at bedside for the first 15 mins post initiation. Pt has experienced no as side effects at this time.

## 2021-12-25 NOTE — CARE COORDINATION
POST-PARTUM TRANSITIONAL CARE PLAN    High-risk pregnancy, unspecified trimester [O09.90]    Writer met w/ Dearjas Pritchett at bedside to discuss DCP. She is S/P  on 21    Infant name on BC: [address], [address]. Infant to [place]. Infant PCP [facility]. FOB: Guy Mcneil    Writer verified name/address/phone number correct on [employer] correct. Dearjas Pritchett has 30 days from date of birth to add  to insurance policy. Elvi Pritchett verbalized has/have all necessary items for Shanda. DME: none  HOME CARE: none    No Home Care or DME anticipated. Anticipate DC of couplet 21    CM continue to follow for any DC needs.

## 2021-12-25 NOTE — PROGRESS NOTES
Labor Progress Note    Elizabeth Castellanos is a 29 y.o. female [id number] at 35w3d  The patient was seen and examined. Her pain is well controlled. She reports fetal movement is present, complains of contractions, complains of loss of fluid, denies vaginal bleeding.        Vital Signs:  Vitals:    21 1700 21 1800 21 1900 21 1929   BP: 125/77 138/80 136/78 (!) 140/84   Pulse: 96 88 86 85   Resp: 14 14 16 16   Temp:  98 °F (36.7 °C)  98.1 °F (36.7 °C)   TempSrc:  Oral  Oral   SpO2:  100%     Weight:  149 lb (67.6 kg)     Height:  5' 2\" (1.575 m)         FHT: 130, moderate variability, accelerations present, decelerations absent  Contractions: regular, every 3-4 minutes    Chaperone for Intimate Exam: Chaperone was present for entire exam, Chaperone Name: Jaylene Gross RN  Cervical Exam: 4 cm dilated, 80 effaced, -1 station  Pitocin: @ 12 mu/min    Membranes: Ruptured clear fluid  Scalp Electrode in place: absent  Intrauterine Pressure Catheter in Place: absent    Interventions: SVE    Assessment/Plan:  Elizabeth Castellanos is a 29 y.o. female [id number] at 35w3d admitted for  Pre-labor Rupture of Membranes > 24 hr   - GBS negative, No indication for GBS prophylaxis  - VSS, Afebrile  - cEFM/TOCO  - Pitocin per protocol   - Anticipate vaginal delivery  - Continue to monitor             Attending updated and in agreement with plan    Lyssa Gottlieb DO  Ob/Gyn Resident  2021, 7:39 PM

## 2021-12-25 NOTE — PROGRESS NOTES
Patient reports pain scale of  10/10. Patient states that she has used the nitrous with approximately 100% of her contractions. Patient reports nitrous is/is not helping with pain control.

## 2021-12-25 NOTE — PROGRESS NOTES
Labor Progress Note    Zafar Mckeon is a 29 y.o. female [id number] at 35w3d  The patient was seen and examined. Her pain is well controlled with nitrous. She reports fetal movement is present, complains of contractions, complains of loss of fluid, denies vaginal bleeding.        Vital Signs:  Vitals:    21 1700 21 1800 21 1900 21 1929   BP: 125/77 138/80 136/78 (!) 140/84   Pulse: 96 88 86 85   Resp: 14 14 16 16   Temp:  98 °F (36.7 °C)  98.1 °F (36.7 °C)   TempSrc:  Oral  Oral   SpO2:  100%     Weight:  149 lb (67.6 kg)     Height:  5' 2\" (1.575 m)         FHT: 130, moderate variability, accelerations present, decelerations absent  Contractions: regular, every 3-4 minutes    Chaperone for Intimate Exam: Chaperone was present for entire exam, Chaperone Name: Mat Falcon RN  Cervical Exam: 5 cm dilated, 90 effaced, 0 station  Pitocin: @ 12 mu/min    Membranes: Ruptured clear fluid  Scalp Electrode in place: absent  Intrauterine Pressure Catheter in Place: absent    Interventions: SVE    Assessment/Plan:  Zafar Mckeon is a 29 y.o. female [id number] at 35w3d admitted for  Pre-labor Rupture of Membranes > 24 hr   - GBS negative, No indication for GBS prophylaxis  - VSS, Afebrile  - cEFM/TOCO  - Nitrous   - Pitocin per protocol   - Anticipate vaginal delivery  - Continue to monitor          Attending updated and in agreement with plan    Joni Figueroa DO  Ob/Gyn Resident  2021, 8:36 PM

## 2021-12-26 PROBLEM — O13.9 GESTATIONAL HYPERTENSION: Status: ACTIVE | Noted: 2021-12-26

## 2021-12-26 PROBLEM — D64.9 ANEMIA: Status: ACTIVE | Noted: 2021-12-26

## 2021-12-26 PROBLEM — F41.9 ANXIETY: Status: ACTIVE | Noted: 2021-12-26

## 2021-12-26 LAB
ALBUMIN SERPL-MCNC: 3.3 G/DL (ref 3.5–5.2)
ALBUMIN/GLOBULIN RATIO: 1.2 (ref 1–2.5)
ALP BLD-CCNC: 93 U/L (ref 35–104)
ALT SERPL-CCNC: 12 U/L (ref 5–33)
ANION GAP SERPL CALCULATED.3IONS-SCNC: 13 MMOL/L (ref 9–17)
AST SERPL-CCNC: 20 U/L
BILIRUB SERPL-MCNC: 0.25 MG/DL (ref 0.3–1.2)
BUN BLDV-MCNC: 15 MG/DL (ref 6–20)
BUN/CREAT BLD: ABNORMAL (ref 9–20)
CALCIUM SERPL-MCNC: 8.9 MG/DL (ref 8.6–10.4)
CHLORIDE BLD-SCNC: 102 MMOL/L (ref 98–107)
CO2: 22 MMOL/L (ref 20–31)
CREAT SERPL-MCNC: 0.55 MG/DL (ref 0.5–0.9)
CREATININE URINE: 92.1 MG/DL (ref 28–217)
GFR AFRICAN AMERICAN: >60 ML/MIN
GFR NON-AFRICAN AMERICAN: >60 ML/MIN
GFR SERPL CREATININE-BSD FRML MDRD: ABNORMAL ML/MIN/{1.73_M2}
GFR SERPL CREATININE-BSD FRML MDRD: ABNORMAL ML/MIN/{1.73_M2}
GLUCOSE BLD-MCNC: 165 MG/DL (ref 65–105)
GLUCOSE BLD-MCNC: 177 MG/DL (ref 70–99)
HCT VFR BLD CALC: 28.6 % (ref 36.3–47.1)
HEMOGLOBIN: 9.2 G/DL (ref 11.9–15.1)
MCH RBC QN AUTO: 29 PG (ref 25.2–33.5)
MCHC RBC AUTO-ENTMCNC: 32.2 G/DL (ref 28.4–34.8)
MCV RBC AUTO: 90.2 FL (ref 82.6–102.9)
NRBC AUTOMATED: 0 PER 100 WBC
PDW BLD-RTO: 13.8 % (ref 11.8–14.4)
PLATELET # BLD: 210 K/UL (ref 138–453)
PMV BLD AUTO: 10.5 FL (ref 8.1–13.5)
POTASSIUM SERPL-SCNC: 3.8 MMOL/L (ref 3.7–5.3)
RBC # BLD: 3.17 M/UL (ref 3.95–5.11)
SODIUM BLD-SCNC: 137 MMOL/L (ref 135–144)
TOTAL PROTEIN, URINE: 29 MG/DL
TOTAL PROTEIN: 6 G/DL (ref 6.4–8.3)
URINE TOTAL PROTEIN CREATININE RATIO: 0.31 (ref 0–0.2)
WBC # BLD: 13.2 K/UL (ref 3.5–11.3)

## 2021-12-26 PROCEDURE — 6370000000 HC RX 637 (ALT 250 FOR IP): Performed by: STUDENT IN AN ORGANIZED HEALTH CARE EDUCATION/TRAINING PROGRAM

## 2021-12-26 PROCEDURE — 84156 ASSAY OF PROTEIN URINE: CPT

## 2021-12-26 PROCEDURE — 36415 COLL VENOUS BLD VENIPUNCTURE: CPT

## 2021-12-26 PROCEDURE — 93005 ELECTROCARDIOGRAM TRACING: CPT | Performed by: STUDENT IN AN ORGANIZED HEALTH CARE EDUCATION/TRAINING PROGRAM

## 2021-12-26 PROCEDURE — 80053 COMPREHEN METABOLIC PANEL: CPT

## 2021-12-26 PROCEDURE — 82570 ASSAY OF URINE CREATININE: CPT

## 2021-12-26 PROCEDURE — 82947 ASSAY GLUCOSE BLOOD QUANT: CPT

## 2021-12-26 PROCEDURE — 1220000000 HC SEMI PRIVATE OB R&B

## 2021-12-26 PROCEDURE — 85027 COMPLETE CBC AUTOMATED: CPT

## 2021-12-26 RX ORDER — FERROUS SULFATE 325(65) MG
325 TABLET ORAL 2 TIMES DAILY
Qty: 60 TABLET | Refills: 0 | Status: SHIPPED | OUTPATIENT
Start: 2021-12-26 | End: 2022-06-22

## 2021-12-26 RX ADMIN — IBUPROFEN 600 MG: 600 TABLET, FILM COATED ORAL at 20:26

## 2021-12-26 RX ADMIN — IBUPROFEN 600 MG: 600 TABLET, FILM COATED ORAL at 00:19

## 2021-12-26 RX ADMIN — DOCUSATE SODIUM 100 MG: 100 CAPSULE ORAL at 20:27

## 2021-12-26 RX ADMIN — DOCUSATE SODIUM 100 MG: 100 CAPSULE ORAL at 08:41

## 2021-12-26 RX ADMIN — SERTRALINE 50 MG: 50 TABLET, FILM COATED ORAL at 08:41

## 2021-12-26 ASSESSMENT — PAIN SCALES - GENERAL
PAINLEVEL_OUTOF10: 0
PAINLEVEL_OUTOF10: 0
PAINLEVEL_OUTOF10: 1

## 2021-12-26 NOTE — PROGRESS NOTES
POST PARTUM DAY # 1    Aly Javier is a 29 y.o. female  This patient was seen & examined today.  on 21    Her pregnancy was complicated by:   Patient Active Problem List   Diagnosis    Rh+/RI/GBS neg    Postpartum uterine atony without hemorrhage (G2)    Atypical glandular cells on cervical Pap smear    Hx of PreE w/ SF's (G2)    Hx of GDMA1 (G2)    Medication exposure during first trimester of pregnancy    Hx of LEEP (2007)    Hx of ventral hernia repair w/ mesh (2019)    No residual cervix noted on MFM US 21    celestone 21 & 21, Rescue 21 & 21    Abnormal glucose tolerance test (GTT) during pregnancy, antepartum    SARS-CoV-2 positive    Premature cervical dilation in third trimester    High-risk pregnancy, unspecified trimester     21 F Apg 8/9 Wt 6#1    Hx PPROM (G4)       Today she is doing well without any chief complaint. Her lochia is light. She denies chest pain, shortness of breath, headache, lightheadedness and blurred vision. She is bottle feeding and she denies any breast tenderness. She is ambulating well. Her voiding pattern is normal. I reviewed signs and symptoms of post partum depression with the patient, she currently denies any of these symptoms. She is tolerating solids.      Vital Signs:  Vitals:    21 1200 21 1600 21 2153 21 0403   BP: 127/65 124/76 116/77 122/78   Pulse: 85 93 93 91   Resp: 18 18 18 17   Temp: 97.9 °F (36.6 °C) 97.9 °F (36.6 °C) 98.1 °F (36.7 °C)    TempSrc: Oral Oral Oral    SpO2:       Weight:       Height:            Physical Exam:  General:  no apparent distress, alert and cooperative  Neurologic:  alert, oriented, normal speech, no focal findings or movement disorder noted  Lungs:  No increased work of breathing, good air exchange, clear to auscultation bilaterally, no crackles or wheezing  Heart:  Normal apical impulse, regular rate and rhythm, normal S1 and S2, no S3 or S4, and no murmur noted    Abdomen: abdomen soft, non-distended, non-tender  Fundus: non-tender, firm, below umbilicus  Extremities:  no calf tenderness, non edematous    Lab:  Lab Results   Component Value Date    HGB 12.1 2021     Lab Results   Component Value Date    HCT 35.7 (L) 2021       Assessment/Plan:  1. Stefany Ko is a [id number] PPD # 1 s/p    - Doing well, VSS overnight   - Female infant in [address]   - Encourage ambulation   - Postpartum Hgb/Hct if symptomatic   - Motrin/Tylenol for pain  2. Rh positive/Rubella immune  3. Bottle feeding  4. Elevated BPs   - Pt with a few elevated BP during admission but does not meet criteria for gHTN   - Denies s/s PreE at this time, will obtain labs if patient meets criteria  5. Hx PreE w/ SF   - Pt received Mag sulfate In G2 pregnancy   - BP controlled currently, will monitor carefully  6. Anxiety   - Pt reports symptoms stable on zoloft 50mg daily   - Discussed s/s postpartum depression  7. Hx LEEP   - Most recent pap negative 21   - Encourage close follow up  8. Hx COVID-19   - Positive    - Denies residual s/s  9. BMI 27  10. Continue post partum care. Desires d/c today. Counseling Completed:  Secondary Smoke risks and Sudden Infant Death Syndrome were reviewed with recommendations. Infant sleeping, \"back to sleep\" and avoidance of co-sleeping recommendations were reviewed. Signs and Symptoms of Post Partum Depression were reviewed. The patient is to call if any occur. Signs and symptoms of Mastitis were reviewed. The patient is to call if any occur for follow up.   Discharge instructions including pelvic rest, no driving with pain medicine and office follow-up were reviewed with patient     Attending Physician: Dr. Randell Guevara DO  Ob/Gyn Resident   2021, 4:42 AM     Date: 2021  Time: 10:48 AM      Patient Name: Stefany Ko  Patient : 1985  Room/Bed: 7979/6403-99  Admission Date/Time: 2021  1:14 PM        Attending Physician Statement  I have personally seen, evaluated and discussed the care of Paulina Mei, including pertinent history and exam findings with the resident. I have reviewed and edited their note in the electronic medical record. The key elements of all parts of the encounter have been performed/reviewed by me. I agree with the assessment, plan and orders as documented by the resident. The level of care submitted represents to the best of my ability the care documented in the medical record today. GC Modifier. This service has been performed in part by a resident under the direction of a teaching physician. Attending's Name:  Nasir Massey MD        Patient doing well. She has no concerns or complaints. Continue routine post-partum care.

## 2021-12-26 NOTE — FLOWSHEET NOTE
Patient called out saying she felt \"funny\". Writer went in to room and patient said she felt like her heart was racing. She also felt a little shaky before eating and requests writer to check blood sugar.

## 2021-12-27 VITALS
OXYGEN SATURATION: 96 % | RESPIRATION RATE: 20 BRPM | HEIGHT: 62 IN | HEART RATE: 80 BPM | WEIGHT: 149 LBS | SYSTOLIC BLOOD PRESSURE: 127 MMHG | BODY MASS INDEX: 27.42 KG/M2 | TEMPERATURE: 98.5 F | DIASTOLIC BLOOD PRESSURE: 84 MMHG

## 2021-12-27 LAB
EKG ATRIAL RATE: 108 BPM
EKG P AXIS: 71 DEGREES
EKG P-R INTERVAL: 158 MS
EKG Q-T INTERVAL: 318 MS
EKG QRS DURATION: 80 MS
EKG QTC CALCULATION (BAZETT): 426 MS
EKG R AXIS: 98 DEGREES
EKG T AXIS: 38 DEGREES
EKG VENTRICULAR RATE: 108 BPM

## 2021-12-27 PROCEDURE — 90471 IMMUNIZATION ADMIN: CPT | Performed by: STUDENT IN AN ORGANIZED HEALTH CARE EDUCATION/TRAINING PROGRAM

## 2021-12-27 PROCEDURE — 6370000000 HC RX 637 (ALT 250 FOR IP): Performed by: STUDENT IN AN ORGANIZED HEALTH CARE EDUCATION/TRAINING PROGRAM

## 2021-12-27 PROCEDURE — 90715 TDAP VACCINE 7 YRS/> IM: CPT | Performed by: STUDENT IN AN ORGANIZED HEALTH CARE EDUCATION/TRAINING PROGRAM

## 2021-12-27 PROCEDURE — 93010 ELECTROCARDIOGRAM REPORT: CPT | Performed by: INTERNAL MEDICINE

## 2021-12-27 PROCEDURE — 6360000002 HC RX W HCPCS: Performed by: STUDENT IN AN ORGANIZED HEALTH CARE EDUCATION/TRAINING PROGRAM

## 2021-12-27 RX ADMIN — IBUPROFEN 600 MG: 600 TABLET, FILM COATED ORAL at 04:24

## 2021-12-27 RX ADMIN — SERTRALINE 50 MG: 50 TABLET, FILM COATED ORAL at 08:11

## 2021-12-27 RX ADMIN — TETANUS TOXOID, REDUCED DIPHTHERIA TOXOID AND ACELLULAR PERTUSSIS VACCINE, ADSORBED 0.5 ML: 5; 2.5; 8; 8; 2.5 SUSPENSION INTRAMUSCULAR at 10:45

## 2021-12-27 RX ADMIN — IBUPROFEN 600 MG: 600 TABLET, FILM COATED ORAL at 10:42

## 2021-12-27 RX ADMIN — DOCUSATE SODIUM 100 MG: 100 CAPSULE ORAL at 08:11

## 2021-12-27 ASSESSMENT — PAIN SCALES - GENERAL
PAINLEVEL_OUTOF10: 0

## 2021-12-27 NOTE — PROGRESS NOTES
Obstetric/Gynecology Resident Interval Note    Patient seen and reexamined. She is feeling well without complaints. Discussed only significant portion of labs just drawn was a Hgb of 9.2 and that we will discharge with PO iron. EKG was sinus tach. Will continue to monitor. Patient and partner agreeable. Attending updated.      Koffi Wells DO  OB/GYN Resident, PGY3  [facility]  12/26/2021, 7:53 PM

## 2021-12-27 NOTE — PROGRESS NOTES
Obstetric/Gynecology Resident Interval Note    Notified by RN that patient is complaining of \"feeling weird. \" Patient seen and examined. She reports very recently having the sensation of palpitations in her chest, as well as \"feeling weird\" and that her eyelids feel heavy. She is well-appearing and in no distress. Most recent vitals , /83. SpO2 97% pr RN. Speaking in full sentences, no complaints of chest pain or difficulty breathing. No edema or leg pain. Heart is tachycardic with regular rhythm on ausculation. Patient's apple watch reads current HR as 118. Will obtain PreE labs and EKG and reevaluate. Patient and partner agree with plan of care. Attending updated and in agreement.        Pranav Lyles DO  OB/GYN Resident, PGY3  [facility]  12/26/2021, 7:05 PM

## 2021-12-27 NOTE — PLAN OF CARE
Pt ordered to be dc   Problem: Anxiety:  Goal: Level of anxiety will decrease  Description: Level of anxiety will decrease  12/27/2021 0604 by Kirstin Swan RN  Outcome: Completed  12/27/2021 0601 by Kirstin Swan RN  Outcome: Ongoing     Problem: Breathing Pattern - Ineffective:  Goal: Able to breathe comfortably  Description: Able to breathe comfortably  12/27/2021 0604 by Kirstin Swan RN  Outcome: Completed  12/27/2021 0601 by Kirstin Swan RN  Outcome: Ongoing     Problem: Fluid Volume - Imbalance:  Goal: Absence of imbalanced fluid volume signs and symptoms  Description: Absence of imbalanced fluid volume signs and symptoms  12/27/2021 0604 by Kirstin Swan RN  Outcome: Completed  12/27/2021 0601 by Kirstin Swan RN  Outcome: Ongoing  Goal: Absence of intrapartum hemorrhage signs and symptoms  Description: Absence of intrapartum hemorrhage signs and symptoms  12/27/2021 0604 by Kirstin Swan RN  Outcome: Completed  12/27/2021 0601 by Kirstin Swan RN  Outcome: Ongoing  Goal: Absence of imbalanced fluid volume signs and symptoms  Description: Absence of imbalanced fluid volume signs and symptoms  12/27/2021 1116 by Kala Downs RN  Outcome: Completed  12/27/2021 0605 by Kirstin Swan RN  Outcome: Ongoing  Goal: Absence of postpartum hemorrhage signs and symptoms  Description: Absence of postpartum hemorrhage signs and symptoms  12/27/2021 1116 by Kala Downs RN  Outcome: Completed  12/27/2021 0605 by Kirstin Swan RN  Outcome: Ongoing     Problem: Infection - Intrapartum Infection:  Goal: Will show no infection signs and symptoms  Description: Will show no infection signs and symptoms  12/27/2021 0604 by Kirstin Swan RN  Outcome: Completed  12/27/2021 0601 by Kirstin Swan RN  Outcome: Ongoing     Problem: Labor Process - Prolonged:  Goal: Labor progression, first stage, within specified pattern  Description: Labor progression, first stage, within specified pattern  12/27/2021 0604 by Kirstin Swan RN  Outcome: Completed  2021 06 by Kirstin Swan RN  Outcome: Ongoing  Goal: Labor progession, second stage, within specified pattern  Description: Labor progession, second stage, within specified pattern  2021 0604 by Kirstin Swan RN  Outcome: Completed  2021 06 by Kirstin Swan RN  Outcome: Ongoing  Goal: Uterine contractions within specified parameters  Description: Uterine contractions within specified parameters  2021 0604 by Kirstin Swan RN  Outcome: Completed  2021 06 by Kirstin Swan RN  Outcome: Ongoing     Problem:  Screening:  Goal: Ability to make informed decisions regarding treatment has improved  Description: Ability to make informed decisions regarding treatment has improved  2021 0604 by Kirstin Swan RN  Outcome: Completed  2021 06 by Kirstin Swan RN  Outcome: Ongoing     Problem: Pain - Acute:  Goal: Pain level will decrease  Description: Pain level will decrease  2021 0604 by Kirstin Swan RN  Outcome: Completed  2021 06 by Kirstin Swan RN  Outcome: Ongoing  Goal: Able to cope with pain  Description: Able to cope with pain  2021 0604 by Kirstin Swan RN  Outcome: Completed  2021 06 by Kirstin Swan RN  Outcome: Ongoing  Goal: Pain level will decrease  Description: Pain level will decrease  2021 1116 by Kala Downs RN  Outcome: Completed  2021 0605 by Kirstin Swan RN  Outcome: Ongoing     Problem: Tissue Perfusion - Uteroplacental, Altered:  Goal: Absence of abnormal fetal heart rate pattern  Description: Absence of abnormal fetal heart rate pattern  2021 0604 by Kirstin Swan RN  Outcome: Completed  2021 06 by Kirstin Swan RN  Outcome: Ongoing     Problem: Urinary Retention:  Goal: Experiences of bladder distention will decrease  Description: Experiences of bladder distention will decrease  2021 0604 by Kirstin Swan RN  Outcome: Completed  12/27/2021 0601 by Syl Thomas RN  Outcome: Ongoing  Goal: Urinary elimination within specified parameters  Description: Urinary elimination within specified parameters  12/27/2021 0604 by Syl Thomas RN  Outcome: Completed  12/27/2021 0601 by Syl Thomas RN  Outcome: Ongoing     Problem: Pain:  Goal: Pain level will decrease  Description: Pain level will decrease  12/27/2021 0604 by Syl Thomas RN  Outcome: Completed  12/27/2021 0601 by Syl Thomas RN  Outcome: Ongoing  Goal: Control of acute pain  Description: Control of acute pain  12/27/2021 0604 by Syl Thomas RN  Outcome: Completed  12/27/2021 0601 by Syl Thomas RN  Outcome: Ongoing  Goal: Control of chronic pain  Description: Control of chronic pain  12/27/2021 0604 by Syl Thomas RN  Outcome: Completed  12/27/2021 0601 by Syl Thomas RN  Outcome: Ongoing     Problem: Discharge Planning:  Goal: Discharged to appropriate level of care  Description: Discharged to appropriate level of care  12/27/2021 1116 by Jose Antonio Rivera RN  Outcome: Completed  12/27/2021 0605 by Syl Thomas RN  Outcome: Ongoing     Problem: Constipation:  Goal: Bowel elimination is within specified parameters  Description: Bowel elimination is within specified parameters  12/27/2021 1116 by Jose Antonio Rivera RN  Outcome: Completed  12/27/2021 0605 by Syl Thomas RN  Outcome: Ongoing     Problem: Infection - Risk of, Puerperal Infection:  Goal: Will show no infection signs and symptoms  Description: Will show no infection signs and symptoms  12/27/2021 1116 by Jose Antonio Rivera RN  Outcome: Completed  12/27/2021 0605 by Syl Thomas RN  Outcome: Ongoing     Problem: Mood - Altered:  Goal: Mood stable  Description: Mood stable  12/27/2021 1116 by Jose Antonio Rivera RN  Outcome: Completed  12/27/2021 0605 by Syl Thomas RN  Outcome: Ongoing

## 2021-12-27 NOTE — PLAN OF CARE
Milton, RN  Outcome: Completed  2021 by Haseeb Serrano RN  Outcome: Ongoing     Problem:  Screening:  Goal: Ability to make informed decisions regarding treatment has improved  Description: Ability to make informed decisions regarding treatment has improved  2021 by Haseeb Serrano, RN  Outcome: Completed  2021 by Haseeb Serrano RN  Outcome: Ongoing     Problem: Pain - Acute:  Goal: Pain level will decrease  Description: Pain level will decrease  2021 06 by Haseeb Serrano, RN  Outcome: Completed  2021 by Haseeb Serrano RN  Outcome: Ongoing  Goal: Able to cope with pain  Description: Able to cope with pain  2021 by Haseeb Serrano, RN  Outcome: Completed  2021 by Haseeb Serrano RN  Outcome: Ongoing     Problem: Tissue Perfusion - Uteroplacental, Altered:  Goal: Absence of abnormal fetal heart rate pattern  Description: Absence of abnormal fetal heart rate pattern  2021 by Haseeb Serrano, RN  Outcome: Completed  2021 by Haseeb Serrano RN  Outcome: Ongoing     Problem: Urinary Retention:  Goal: Experiences of bladder distention will decrease  Description: Experiences of bladder distention will decrease  2021 by Haseeb Serrano, RN  Outcome: Completed  2021 by Haseeb Serrano RN  Outcome: Ongoing  Goal: Urinary elimination within specified parameters  Description: Urinary elimination within specified parameters  2021 by Haseeb Serrano, RN  Outcome: Completed  2021 by Haseeb Serrano RN  Outcome: Ongoing     Problem: Pain:  Goal: Pain level will decrease  Description: Pain level will decrease  2021 by Haseeb Serrano, RN  Outcome: Completed  2021 by Haseeb Serrano RN  Outcome: Ongoing  Goal: Control of acute pain  Description: Control of acute pain  2021 by Haseeb Serrano, RN  Outcome: Completed  2021 by Haseeb Serrano RN  Outcome: Ongoing  Goal: Control of chronic pain  Description: Control of chronic pain  12/27/2021 0604 by Jasmin Wang RN  Outcome: Completed  12/27/2021 0601 by Jasmin Wang RN  Outcome: Ongoing

## 2021-12-27 NOTE — FLOWSHEET NOTE
Patient ordered to be dc home. RN reviewed dc instructions with patient and spouse. Patient stated understanding. Patient left at [phone] with spouse and baby.

## 2021-12-27 NOTE — PROGRESS NOTES
POST PARTUM DAY # 2    Elsy Aguilar is a 29 y.o. female  This patient was seen & examined today.  21    Her pregnancy was complicated by:   Patient Active Problem List   Diagnosis    Rh+/RI/GBS neg    Postpartum uterine atony without hemorrhage (G2)    Atypical glandular cells on cervical Pap smear    Hx of PreE w/ SF's (G2)    Hx of GDMA1 (G2)    Medication exposure during first trimester of pregnancy    Hx of LEEP (2007)    Hx of ventral hernia repair w/ mesh (2019)    No residual cervix noted on MFM US 21    celestone 21 & 21, Rescue 21 & 21    Abnormal glucose tolerance test (GTT) during pregnancy, antepartum    SARS-CoV-2 positive    Premature cervical dilation in third trimester    High-risk pregnancy, unspecified trimester     21 F Apg 8/ Wt 6#1    Hx PPROM (G4)    Anxiety    Anemia    Gestational hypertension > PreE w/o SF       Today she is doing well without any chief complaint. Her lochia is light. She denies chest pain, shortness of breath, headache, lightheadedness, blurred vision, peripheral edema and palpitations. She is not breast feeding and she denies any breast tenderness. She is ambulating well. Her voiding pattern is normal. I reviewed signs and symptoms of post partum depression with the patient, she currently denies any of these symptoms. She is tolerating solids.      Vital Signs:  Vitals:    21 1600 21 1845 21 2000 21 0000   BP: (!) 139/97 136/83 129/87 129/84   Pulse: 83 110 107 95   Resp: 16  20 16   Temp: 98.2 °F (36.8 °C)  98.8 °F (37.1 °C) 98.1 °F (36.7 °C)   TempSrc: Oral  Oral Oral   SpO2:  97% 97% 98%   Weight:       Height:             Physical Exam:  General:  no apparent distress, alert and cooperative  Neurologic:  alert, oriented, normal speech, no focal findings  Lungs:  No increased work of breathing, good air exchange, clear to auscultation bilaterally, no crackles or wheezing  Heart: normal S1 and S2 and regular rate and rhythm    Abdomen: abdomen soft, non-distended, non-tender  Fundus: non-tender, normal size, firm, below umbilicus  Extremities:  no calf tenderness, non edematous     Lab:  Lab Results   Component Value Date    HGB 9.2 (L) 2021     Lab Results   Component Value Date    HCT 28.6 (L) 2021       Assessment/Plan:  1. Angelica Tse is a [id number] PPD # 2 s/p    - Doing well  - VSS, Afebrile   - female infant in [place] [address] Nursery   - Encourage ambulation   - Motrin/Tylenol    - Postpartum Hgb/Hct completed - see below  2. Rh positive/Rubella immune  3. Bottle feeding    - Denies s/s mastitis at this time  4. PreE without Severe Features   - Patient met criteria for gHTN PreE labs were wnl except P:C 0.31   - Denies s/s PreE at this time   - BP normotensive   - Continue to monitor  5. Anemia   - Patient complained of palpitations yesterday. Labs resulted with Hgb 9.3. Asymptomatic now. - PO iron printed   - EKG performed due to patient feeling palpations and slightly tachycardic. Resulted as sinus tach. - clinically asymptomatic at this time   - Continue to monitor   6. Anxiety   - Stable with Zoloft 50 mg QD   - Continue to monitor  7. Hx LEEP   - Pap negative 21   - Encouraged outpatient follow up  8. Hx COVID-19   - Positive 21   - Denies s/s   - Strongly encouraged vaccination  9. Continue post partum care    Counseling Completed:  Secondary Smoke risks and Sudden Infant Death Syndrome were reviewed with recommendations. Infant sleeping, \"back to sleep\" and avoidance of co-sleeping recommendations were reviewed. Signs and Symptoms of Post Partum Depression were reviewed. The patient is to call if any occur. Signs and symptoms of Mastitis were reviewed. The patient is to call if any occur for follow up.   Discharge instructions including pelvic rest, no driving with pain medicine and office follow-up were reviewed with patient     Attending Physician: Dr. Gerry Aguilar, DO  Ob/Gyn Resident   12/27/2021, 5:30 AM         Attending Physician Statement  I have discussed the care of Dheeraj Teague, including pertinent history and exam findings,  with the resident. I have reviewed the key elements of all parts of the encounter with the resident. I agree with the assessment, plan and orders as documented by the resident.   (GE Modifier)    Electronically signed by Pineda Wilder MD at 10:44 AM 12/27/21

## 2021-12-28 LAB — SURGICAL PATHOLOGY REPORT: NORMAL

## 2021-12-28 NOTE — PROGRESS NOTES
[facility]ANS  [facility] - [address] [facility]  Dept: [address]  2021  10:50 AM        Milderd Show is a 29 y.o. female [id number]      The patient was seen. She has no chief complaints today. She delivered vaginally on 21. She is not breast feeding and there is not any signs or symptoms of mastitis. The patient completed the E.P.D.S. Evaluation form and scored 2. She does not have any signs or symptoms of post partum depression. She denies any suicidal thoughts with a plan, intent to harm others and delusional ideas. Today her lochia is light she denies any dizziness or shortness of breath. Her pregnancy was complicated by:  Patient Active Problem List    Diagnosis Date Noted    Anxiety 2021     Overview Note:     zoloft 50 daily      Anemia 2021     Overview Note:     Hgb 9.2 discharged with PO iron      Gestational hypertension > PreE w/o SF 2021     Overview Note:     PreE labs wnl P:C 0.31 Patient met criteria for PreE without SF       21 F Apg 8/9 Wt 6#1 2021    Hx PPROM (G4) 2021    High-risk pregnancy, unspecified trimester 2021    Premature cervical dilation in third trimester 2021    SARS-CoV-2 positive 2021     Overview Note:      positive test      Abnormal glucose tolerance test (GTT) during pregnancy, antepartum 2021     Overview Note:     1 hr GTT: 146  3 hr GTT: **      celestone 21 & 21, Rescue 21 & 2021     Overview Note:     21 Patient seen and evaluated for no residual cervix at MFM, evaluated on L&D, visually dilated w/ visible bag of water. Patient elected for outpatient celestone w/ first shot today. Currently asymptomatic.    21 Pt having occ ctxs and feeling some pressure, found to be 3 cm      No residual cervix noted on MFM US 2021    Hx of LEEP () 2021 Overview Note:     For HGSIL on Pap       Hx of ventral hernia repair w/ mesh (2019) 2021     Overview Note:     Diagnostic laparoscopy with open ventral hernia repair with mesh (19)      Medication exposure during first trimester of pregnancy 2021     Overview Note:     Clonazepam initally  Zoloft      Hx of PreE w/ SF's (G2) 2021     Overview Note:     Baseline PreE labs WNL    New onset hypertension and thrombocytopenia (Plt 90,000)  Received Mag x24 hours       Hx of GDMA1 (G2) 2021     Overview Note:     Early 1 hr GTT **      Atypical glandular cells on cervical Pap smear 10/12/2020     Overview Note:     EMB & [place] 10/12/20      Postpartum uterine atony without hemorrhage (G2) 2018     Overview Note:     IOL 2/2 PreEclampsia (new onset HTN and thrombocytopenia of platelets 08,469)  Patient on magnesium during induction  Uterine atony during delivery - received Hemabate and then 600mcg buccal cytotec afterwards,, EBL 600mL      Rh+/RI/GBS neg 2018         She does admit to having good home support.       OB History    Para Term  AB Living   4 3 2 1 1 3   SAB IAB Ectopic Molar Multiple Live Births   1 0 0 0 0 3      # Outcome Date GA Lbr Tulio/2nd Weight Sex Delivery Anes PTL Lv   4  21 35w4d 03:31 / 00:03 6 lb 1.2 oz (2.755 kg) F Vag-Spont Nitrous Oxid Y REED      Birth Comments: B/l renal pyelectasis on fetal US   Fetal exposure to SSRI   Nl fetal echo   Maternal COVID infection during pregnancy (21)  Maternal steroids , , ,         Name: Natasha Drought: 8  Apgar5: 9   3 SAB 20     SAB      2 Term 03/15/18 37w4d / 00:27 6 lb 13.5 oz (3.105 kg) F Vag-Spont None N REED      Complications: Preeclampsia, Postpartum uterine atony without hemorrhage      Name: Natasha Drought: 8  Apgar5: 9   1 Term 03/10/06   7 lb 1 oz (3.204 kg) M Vag-Spont   REED       Patient Active Problem List Diagnosis    Rh+/RI/GBS neg    Postpartum uterine atony without hemorrhage (G2)    Atypical glandular cells on cervical Pap smear    Hx of PreE w/ SF's (G2)    Hx of GDMA1 (G2)    Medication exposure during first trimester of pregnancy    Hx of LEEP (2007)    Hx of ventral hernia repair w/ mesh (2019)    No residual cervix noted on MFM US 21    celestone 21 & 21, Rescue 21 & 21    Abnormal glucose tolerance test (GTT) during pregnancy, antepartum    SARS-CoV-2 positive    Premature cervical dilation in third trimester    High-risk pregnancy, unspecified trimester     21 F Apg 8/9 Wt 6#1    Hx PPROM (G4)    Anxiety    Anemia    Gestational hypertension > PreE w/o SF       Blood pressure 129/85, pulse 94, last menstrual period 2021, unknown if currently breastfeeding. Abdomen: Soft and non-tender; good bowel sounds; no guarding, rebound or rigidity; no CVA tenderness bilaterally. Extremities: No calf tenderness bilaterally. DTR 2/4 bilaterally. No edema. Perineum: Deferred    Assessment:   Diagnosis Orders   1.  Postpartum care following vaginal delivery       Chief Complaint   Patient presents with    Postpartum Care     1 wk pp BP check      Patient Active Problem List    Diagnosis Date Noted    Anxiety 2021     zoloft 50 daily      Anemia 2021     Hgb 9.2 discharged with PO iron      Gestational hypertension > PreE w/o SF 2021     PreE labs wnl P:C 0.31 Patient met criteria for PreE without SF       21 F Apg 8/9 Wt 6#1 2021    Hx PPROM (G4) 2021    High-risk pregnancy, unspecified trimester 2021    Premature cervical dilation in third trimester 2021    SARS-CoV-2 positive 2021     11 positive test      Abnormal glucose tolerance test (GTT) during pregnancy, antepartum 2021     1 hr GTT: 146  3 hr GTT: **      celestone 21 & 21, Rescue 21 & 21 preventative health maintenance recommendations and follow-up.     Merlinda Signs, MD

## 2021-12-29 ENCOUNTER — POSTPARTUM VISIT (OUTPATIENT)
Dept: OBGYN CLINIC | Age: 36
End: 2021-12-29

## 2021-12-29 VITALS — HEART RATE: 94 BPM | SYSTOLIC BLOOD PRESSURE: 129 MMHG | DIASTOLIC BLOOD PRESSURE: 85 MMHG

## 2021-12-29 PROCEDURE — 99024 POSTOP FOLLOW-UP VISIT: CPT | Performed by: OBSTETRICS & GYNECOLOGY

## 2022-02-02 ENCOUNTER — POSTPARTUM VISIT (OUTPATIENT)
Dept: OBGYN CLINIC | Age: 37
End: 2022-02-02

## 2022-02-02 VITALS
HEART RATE: 80 BPM | WEIGHT: 129.6 LBS | SYSTOLIC BLOOD PRESSURE: 131 MMHG | HEIGHT: 62 IN | DIASTOLIC BLOOD PRESSURE: 82 MMHG | BODY MASS INDEX: 23.85 KG/M2

## 2022-02-02 PROCEDURE — 0503F POSTPARTUM CARE VISIT: CPT | Performed by: OBSTETRICS & GYNECOLOGY

## 2022-02-02 NOTE — PROGRESS NOTES
[address] [facility] - [address]  [address]  [address]: [address]  2022  10:11 AM        Carlos Enrique Springer is a 29 y.o. female [id number]      The patient was seen. She has no chief complaints today. She delivered vaginally on 21. She is not breast feeding and there is not any signs or symptoms of mastitis. She does not have any signs or symptoms of post partum depression. She denies any suicidal thoughts with a plan, intent to harm others and delusional ideas. She says her bleeding stopped a few weeks ago. Her pregnancy was complicated by:  Patient Active Problem List    Diagnosis Date Noted    Anxiety 2021     Overview Note:     zoloft 50 daily      Anemia 2021     Overview Note:     Hgb 9.2 discharged with PO iron      Gestational hypertension > PreE w/o SF 2021     Overview Note:     PreE labs wnl P:C 0.31 Patient met criteria for PreE without SF       21 F Apg 8/ Wt 6#1 2021    Hx PPROM (G4) 2021    High-risk pregnancy, unspecified trimester 2021    Premature cervical dilation in third trimester 2021    SARS-CoV-2 positive 2021     Overview Note:      positive test      Abnormal glucose tolerance test (GTT) during pregnancy, antepartum 2021     Overview Note:     1 hr GTT: 146  3 hr GTT: **      celestone 21 & 21, Rescue 21 & 2021     Overview Note:     21 Patient seen and evaluated for no residual cervix at MFM, evaluated on L&D, visually dilated w/ visible bag of water. Patient elected for outpatient celestone w/ first shot today. Currently asymptomatic.    21 Pt having occ ctxs and feeling some pressure, found to be 3 cm      No residual cervix noted on MFM US 2021    Hx of LEEP () 2021     Overview Note:     For HGSIL on Pap       Hx of ventral hernia repair w/ mesh (2019) 2021     Overview Note:     Diagnostic laparoscopy with open ventral hernia repair with mesh (19)      Medication exposure during first trimester of pregnancy 2021     Overview Note:     Clonazepam initally  Zoloft      Hx of PreE w/ SF's (G2) 2021     Overview Note:     Baseline PreE labs WNL    New onset hypertension and thrombocytopenia (Plt 90,000)  Received Mag x24 hours       Hx of GDMA1 (G2) 2021     Overview Note:     Early 1 hr GTT **      Atypical glandular cells on cervical Pap smear 10/12/2020     Overview Note:     EMB & [place] 10/12/20      Postpartum uterine atony without hemorrhage (G2) 2018     Overview Note:     IOL 2/2 PreEclampsia (new onset HTN and thrombocytopenia of platelets 79,352)  Patient on magnesium during induction  Uterine atony during delivery - received Hemabate and then 600mcg buccal cytotec afterwards,, EBL 600mL      Rh+/RI/GBS neg 2018       She does admit to having good home support.       OB History    Para Term  AB Living   4 3 2 1 1 3   SAB IAB Ectopic Molar Multiple Live Births   1 0 0 0 0 3      # Outcome Date GA Lbr Tulio/2nd Weight Sex Delivery Anes PTL Lv   4  21 35w4d 03:31 / 00:03 6 lb 1.2 oz (2.755 kg) F Vag-Spont Nitrous Oxid Y REED      Birth Comments: B/l renal pyelectasis on fetal US   Fetal exposure to SSRI   Nl fetal echo   Maternal COVID infection during pregnancy (21)  Maternal steroids , , ,         Name: Juan Britt: 8  Apgar5: 9   3 SAB 20     SAB      2 Term 03/15/18 37w4d / 00:27 6 lb 13.5 oz (3.105 kg) F Vag-Spont None N REED      Complications: Preeclampsia, Postpartum uterine atony without hemorrhage      Name: Sammshirley Merino: 8  Apgar5: 9   1 Term 03/10/06   7 lb 1 oz (3.204 kg) M Vag-Spont   REED       Patient Active Problem List   Diagnosis    Rh+/RI/GBS neg    Postpartum uterine atony without hemorrhage (G2)    Atypical glandular cells on cervical Pap smear    Hx of PreE w/ SF's (G2)    Hx of GDMA1 (G2)    Medication exposure during first trimester of pregnancy    Hx of LEEP (2007)    Hx of ventral hernia repair w/ mesh (2019)    No residual cervix noted on MFM US 21    celestone 21 & 21, Rescue 21 & 21    Abnormal glucose tolerance test (GTT) during pregnancy, antepartum    SARS-CoV-2 positive    Premature cervical dilation in third trimester    High-risk pregnancy, unspecified trimester     21 F Apg 8/9 Wt 6#1    Hx PPROM (G4)    Anxiety    Anemia    Gestational hypertension > PreE w/o SF       Blood pressure 131/82, pulse 80, height 5' 2\" (1.575 m), weight 129 lb 9.6 oz (58.8 kg), last menstrual period 2021, not currently breastfeeding. Chaperone for Intimate Exam   Chaperone was offered as part of the rooming process. Patient declined and agrees to continue with exam without a chaperone. Abdomen: Soft and non-tender; good bowel sounds; no guarding, rebound or rigidity; no CVA tenderness bilaterally. Extremities: No calf tenderness bilaterally. DTR 2/4 bilaterally. No edema. Perineum: Normal appearing vulva and vagina. Normal    Assessment:   Diagnosis Orders   1.  Postpartum care following vaginal delivery       Chief Complaint   Patient presents with    Postpartum Care     Patient Active Problem List    Diagnosis Date Noted    Anxiety 2021     zoloft 50 daily      Anemia 2021     Hgb 9.2 discharged with PO iron      Gestational hypertension > PreE w/o SF 2021     PreE labs wnl P:C 0.31 Patient met criteria for PreE without SF       21 F Apg 8/9 Wt 6#1 2021    Hx PPROM (G4) 2021    High-risk pregnancy, unspecified trimester 2021    Premature cervical dilation in third trimester 2021    SARS-CoV-2 positive 2021 positive test      Abnormal glucose tolerance test (GTT) during pregnancy, antepartum 11/09/2021     1 hr GTT: 146  3 hr GTT: **      celestone 9/21/21 & 9/22/21, Rescue 12/6/21 & 12/7/21 09/21/2021 9/21/21 Patient seen and evaluated for no residual cervix at MFM, evaluated on L&D, visually dilated w/ visible bag of water. Patient elected for outpatient celestone w/ first shot today. Currently asymptomatic. 12/6/21 Pt having occ ctxs and feeling some pressure, found to be 3 cm      No residual cervix noted on MFM US 9/21/21 09/17/2021    Hx of LEEP (2007) 09/14/2021     For HGSIL on Pap       Hx of ventral hernia repair w/ mesh (2019) 09/14/2021     Diagnostic laparoscopy with open ventral hernia repair with mesh (1/16/19)      Medication exposure during first trimester of pregnancy 07/09/2021     Clonazepam initally  Zoloft      Hx of PreE w/ SF's (G2) 06/07/2021     Baseline PreE labs WNL    New onset hypertension and thrombocytopenia (Plt 90,000)  Received Mag x24 hours       Hx of GDMA1 (G2) 06/07/2021     Early 1 hr GTT **      Atypical glandular cells on cervical Pap smear 10/12/2020     EMB & [place] 10/12/20      Postpartum uterine atony without hemorrhage (G2) 03/19/2018     IOL 2/2 PreEclampsia (new onset HTN and thrombocytopenia of platelets 42,844)  Patient on magnesium during induction  Uterine atony during delivery - received Hemabate and then 600mcg buccal cytotec afterwards,, EBL 600mL      Rh+/RI/GBS neg 03/14/2018             Plan:  1. Signs & Symptoms of mastitis reviewed; notify if occurs  2. Secondary smoke risks reviewed. Increased risks of respiratory problems, Sudden infant death syndrome, and potential malignancies. 3. Family planning counseling and STD counseling completed. NFP, condoms  4. Return in about 4 months (around 6/2/2022) for annual exam.    Patient was seen with total face to face time of 15 minutes. More than 50% of this visit was on counseling and education regarding her    Diagnosis Orders   1.  Postpartum care following vaginal delivery      and her options. She was also counseled on her preventative health maintenance recommendations and follow-up.     Cristian Zapata MD

## 2022-06-03 PROBLEM — O09.891 MEDICATION EXPOSURE DURING FIRST TRIMESTER OF PREGNANCY: Status: RESOLVED | Noted: 2021-07-09 | Resolved: 2022-06-03

## 2022-06-03 PROBLEM — O34.33 PREMATURE CERVICAL DILATION IN THIRD TRIMESTER: Status: RESOLVED | Noted: 2021-12-06 | Resolved: 2022-06-03

## 2022-06-03 PROBLEM — O26.879 SHORT CERVIX AFFECTING PREGNANCY: Status: RESOLVED | Noted: 2021-09-17 | Resolved: 2022-06-03

## 2022-06-03 PROBLEM — O09.92 HIGH-RISK PREGNANCY IN SECOND TRIMESTER: Status: RESOLVED | Noted: 2021-09-21 | Resolved: 2022-06-03

## 2022-06-03 PROBLEM — O09.90 HIGH-RISK PREGNANCY, UNSPECIFIED TRIMESTER: Status: RESOLVED | Noted: 2021-12-24 | Resolved: 2022-06-03

## 2022-06-03 PROBLEM — O99.810 ABNORMAL GLUCOSE TOLERANCE TEST (GTT) DURING PREGNANCY, ANTEPARTUM: Status: RESOLVED | Noted: 2021-11-09 | Resolved: 2022-06-03

## 2022-06-22 ENCOUNTER — HOSPITAL ENCOUNTER (OUTPATIENT)
Dept: PREADMISSION TESTING | Age: 37
Discharge: HOME | End: 2022-06-26

## 2022-06-22 VITALS
DIASTOLIC BLOOD PRESSURE: 74 MMHG | OXYGEN SATURATION: 99 % | RESPIRATION RATE: 14 BRPM | SYSTOLIC BLOOD PRESSURE: 128 MMHG | HEIGHT: 62 IN | HEART RATE: 78 BPM | WEIGHT: 127.5 LBS | BODY MASS INDEX: 23.46 KG/M2

## 2022-06-22 ASSESSMENT — PAIN SCALES - GENERAL: PAINLEVEL_OUTOF10: 0

## 2022-06-22 NOTE — PRE-PROCEDURE INSTRUCTIONS
[facility] Friday, 7/8/2022 at 1100 AM    Once you enter the hospital lobby, take the elevators to the second floor. Check-In is at the surgery registration desk. Continue to take your home medications as you normally do up to and including the night before surgery with the exception of any blood thinning medications. Please stop any blood thinning medications as directed by your surgeon or prescribing physician. Failure to stop certain medications may interfere with your scheduled surgery. These may include:  Aspirin, Warfarin (Coumadin), Clopidogrel (Plavix), Ibuprofen (Motrin, Advil), Naproxen (Aleve), Meloxicam (Mobic), Celecoxib (Celebrex), Eliquis, Pradaxa, Xarelto, Effient, Fish Oil, Herbal supplements. Please take the following medication(s) the day of surgery with a small sip of water:  Tylenol if needed for pain    Please use your inhalers at home the day of surgery. PREPARING FOR YOUR SURGERY:     Before surgery, you can play an important role in your own health. Because skin is not sterile, we need to be sure that your skin is as free of germs as possible before surgery by carefully washing before surgery. Preparing or prepping skin before surgery can reduce the risk of a surgical site infection.   Do not shave the area of your body where your surgery will be performed unless you received specific permission from your physician. You will need to shower at home the night before surgery and the morning of surgery with a special soap called chlorhexidine gluconate (CHG*). *Not to be used by people allergic to Chlorhexidine Gluconate (CHG). Following these instructions will help you be sure that your skin is clean before surgery. Instructions on cleaning your skin before surgery: The night before your surgery:      You will need to shower with warm water (not hot) and the CHG soap.  Use a clean wash cloth and a clean towel.   Have clean clothes available to put on after the shower.    First wash your hair with regular shampoo. Rinse your hair and body thoroughly to remove the shampoo.  Wash your face and genital area (private parts) with your regular soap or water only. Thoroughly rinse your body with warm water from the neck down.  Turn water off to prevent rinsing the soap off too soon.  With a clean wet washcloth and half of the CHG soap in the bottle, lather your entire body from the neck down. Do not use CHG soap near your eyes or ears to avoid injury to those areas.  Wash thoroughly, paying special attention to the area where your surgery will be performed.  Wash your body gently for five (5) minutes. Avoid scrubbing your skin too hard.  Turn the water back on and rinse your body thoroughly.  Pat yourself dry with a clean, soft towel. Do not apply lotion, cream or powder.  Dress with clean freshly washed clothes. The morning of surgery:     Repeat shower following steps above - using remaining half of CHG soap in bottle. Patient Instructions:    Barbie Albarado If you are having any type of anesthesia you are to have nothing to eat or drink after midnight the night before your surgery. This includes gum, hard candy, mints, water or smoking or chewing tobacco.  The only exception to this is a small sip of water to take with any morning dose of heart, blood pressure, or seizure medications. No alcoholic beverages for 24 hours prior to surgery.  Brush your teeth but do not swallow water.  Bring your eyeglasses and case with you. No contacts are to be worn the day of surgery. You also may bring your hearing aids. Most surgical procedures involving anesthesia will require that you remove your dentures prior to surgery.  If you are on C-PAP or Bi-PAP at home and plan on staying in the hospital overnight for your surgery please bring the machine with you.     · Do not wear any jewelry or body piercings day of surgery. Also, NO lotion, perfume or deodorant to be used the day of surgery. No nail polish on the operative extremity (arm/leg surgeries)    · Do not bring any valuables such as jewelry, cash, or credit cards. If you are staying overnight with us, please bring a small bag of personal items.  Please wear loose, comfortable clothing. If you are potentially going to have a cast or brace bring clothing that will fit over them.  In case of illness - If you have cold or flu like symptoms (high fever, runny nose, sore throat, cough, etc.) rash, nausea, vomiting, loose stools, and/or recent contact with someone who has a contagious disease (chicken pox, measles, etc.) Please call your doctor before coming to the hospital.     If your child is having surgery please make arrangements for any other children to be cared for at home on the day of surgery. Other children are not permitted in recovery room and we want you to be able to spend time with the patient. If other arrangements are not available then we suggest that you have a second adult to stay in the waiting room. Day of Surgery/Procedure:    As a patient at [facility] you can expect quality medical and nursing care that is centered on your individual needs. Our goal is to make your surgical experience as comfortable as possible    . Transportation After Your Surgery/Procedure: You will need a friend or family member to drive you home after your procedure. Your  must be 25years of age or older and able to sign off on your discharge instructions. A taxi cab or any other form of public transportation is not acceptable. Your friend or family member must stay at the hospital throughout your procedure.     Someone must remain with you for the first 24 hours after your surgery if you receive anesthesia or medication. If you do not have someone to stay with you, your procedure may be cancelled.       If you have any other questions regarding your procedure or the day of surgery, please call [phone]      _________________________  ____________________________  Signature (Patient)              Signature (Provider) & date

## 2022-07-05 ENCOUNTER — ANESTHESIA EVENT (OUTPATIENT)
Dept: OPERATING ROOM | Age: 37
End: 2022-07-05
Payer: COMMERCIAL

## 2022-07-08 ENCOUNTER — HOSPITAL ENCOUNTER (OUTPATIENT)
Age: 37
Setting detail: OUTPATIENT SURGERY
Discharge: HOME | End: 2022-07-08
Attending: SURGERY | Admitting: SURGERY
Payer: COMMERCIAL

## 2022-07-08 ENCOUNTER — ANESTHESIA (OUTPATIENT)
Dept: OPERATING ROOM | Age: 37
End: 2022-07-08
Payer: COMMERCIAL

## 2022-07-08 VITALS
OXYGEN SATURATION: 97 % | HEART RATE: 70 BPM | BODY MASS INDEX: 23.37 KG/M2 | TEMPERATURE: 97.9 F | WEIGHT: 127 LBS | RESPIRATION RATE: 16 BRPM | SYSTOLIC BLOOD PRESSURE: 108 MMHG | DIASTOLIC BLOOD PRESSURE: 67 MMHG | HEIGHT: 62 IN

## 2022-07-08 DIAGNOSIS — K43.2 INCISIONAL HERNIA, WITHOUT OBSTRUCTION OR GANGRENE: ICD-10-CM

## 2022-07-08 DIAGNOSIS — G89.18 ACUTE POSTOPERATIVE PAIN: Primary | ICD-10-CM

## 2022-07-08 LAB — HCG, PREGNANCY URINE (POC): NEGATIVE

## 2022-07-08 PROCEDURE — 7100000000 HC PACU RECOVERY - FIRST 15 MIN: Performed by: SURGERY

## 2022-07-08 PROCEDURE — 6360000002 HC RX W HCPCS: Performed by: ANESTHESIOLOGY

## 2022-07-08 PROCEDURE — 3600000003 HC SURGERY LEVEL 3 BASE: Performed by: SURGERY

## 2022-07-08 PROCEDURE — 81025 URINE PREGNANCY TEST: CPT

## 2022-07-08 PROCEDURE — 3700000000 HC ANESTHESIA ATTENDED CARE: Performed by: SURGERY

## 2022-07-08 PROCEDURE — 2720000010 HC SURG SUPPLY STERILE: Performed by: SURGERY

## 2022-07-08 PROCEDURE — 88304 TISSUE EXAM BY PATHOLOGIST: CPT

## 2022-07-08 PROCEDURE — 2580000003 HC RX 258: Performed by: SPECIALIST

## 2022-07-08 PROCEDURE — 6370000000 HC RX 637 (ALT 250 FOR IP): Performed by: ANESTHESIOLOGY

## 2022-07-08 PROCEDURE — 7100000011 HC PHASE II RECOVERY - ADDTL 15 MIN: Performed by: SURGERY

## 2022-07-08 PROCEDURE — 2500000003 HC RX 250 WO HCPCS: Performed by: SURGERY

## 2022-07-08 PROCEDURE — 6360000002 HC RX W HCPCS: Performed by: SURGERY

## 2022-07-08 PROCEDURE — 2500000003 HC RX 250 WO HCPCS: Performed by: SPECIALIST

## 2022-07-08 PROCEDURE — 7100000001 HC PACU RECOVERY - ADDTL 15 MIN: Performed by: SURGERY

## 2022-07-08 PROCEDURE — 3700000001 HC ADD 15 MINUTES (ANESTHESIA): Performed by: SURGERY

## 2022-07-08 PROCEDURE — 2709999900 HC NON-CHARGEABLE SUPPLY: Performed by: SURGERY

## 2022-07-08 PROCEDURE — 6360000002 HC RX W HCPCS: Performed by: SPECIALIST

## 2022-07-08 PROCEDURE — 6360000002 HC RX W HCPCS

## 2022-07-08 PROCEDURE — 3600000013 HC SURGERY LEVEL 3 ADDTL 15MIN: Performed by: SURGERY

## 2022-07-08 PROCEDURE — 7100000010 HC PHASE II RECOVERY - FIRST 15 MIN: Performed by: SURGERY

## 2022-07-08 RX ORDER — LIDOCAINE HYDROCHLORIDE 10 MG/ML
1 INJECTION, SOLUTION EPIDURAL; INFILTRATION; INTRACAUDAL; PERINEURAL
Status: DISCONTINUED | OUTPATIENT
Start: 2022-07-08 | End: 2022-07-08 | Stop reason: HOSPADM

## 2022-07-08 RX ORDER — FENTANYL CITRATE 50 UG/ML
INJECTION, SOLUTION INTRAMUSCULAR; INTRAVENOUS
Status: COMPLETED
Start: 2022-07-08 | End: 2022-07-08

## 2022-07-08 RX ORDER — FENTANYL CITRATE 50 UG/ML
25 INJECTION, SOLUTION INTRAMUSCULAR; INTRAVENOUS EVERY 5 MIN PRN
Status: DISCONTINUED | OUTPATIENT
Start: 2022-07-08 | End: 2022-07-08 | Stop reason: HOSPADM

## 2022-07-08 RX ORDER — ONDANSETRON 2 MG/ML
4 INJECTION INTRAMUSCULAR; INTRAVENOUS
Status: COMPLETED | OUTPATIENT
Start: 2022-07-08 | End: 2022-07-08

## 2022-07-08 RX ORDER — KETOROLAC TROMETHAMINE 30 MG/ML
INJECTION, SOLUTION INTRAMUSCULAR; INTRAVENOUS PRN
Status: DISCONTINUED | OUTPATIENT
Start: 2022-07-08 | End: 2022-07-08 | Stop reason: SDUPTHER

## 2022-07-08 RX ORDER — SODIUM CHLORIDE 9 MG/ML
INJECTION, SOLUTION INTRAVENOUS PRN
Status: DISCONTINUED | OUTPATIENT
Start: 2022-07-08 | End: 2022-07-08 | Stop reason: HOSPADM

## 2022-07-08 RX ORDER — ONDANSETRON 2 MG/ML
INJECTION INTRAMUSCULAR; INTRAVENOUS PRN
Status: DISCONTINUED | OUTPATIENT
Start: 2022-07-08 | End: 2022-07-08 | Stop reason: SDUPTHER

## 2022-07-08 RX ORDER — SODIUM CHLORIDE 9 MG/ML
INJECTION, SOLUTION INTRAVENOUS CONTINUOUS
Status: DISCONTINUED | OUTPATIENT
Start: 2022-07-08 | End: 2022-07-08

## 2022-07-08 RX ORDER — SODIUM CHLORIDE 0.9 % (FLUSH) 0.9 %
5-40 SYRINGE (ML) INJECTION PRN
Status: DISCONTINUED | OUTPATIENT
Start: 2022-07-08 | End: 2022-07-08 | Stop reason: HOSPADM

## 2022-07-08 RX ORDER — DEXAMETHASONE SODIUM PHOSPHATE 10 MG/ML
INJECTION, SOLUTION INTRAMUSCULAR; INTRAVENOUS PRN
Status: DISCONTINUED | OUTPATIENT
Start: 2022-07-08 | End: 2022-07-08 | Stop reason: SDUPTHER

## 2022-07-08 RX ORDER — SODIUM CHLORIDE, SODIUM LACTATE, POTASSIUM CHLORIDE, CALCIUM CHLORIDE 600; 310; 30; 20 MG/100ML; MG/100ML; MG/100ML; MG/100ML
INJECTION, SOLUTION INTRAVENOUS CONTINUOUS
Status: DISCONTINUED | OUTPATIENT
Start: 2022-07-08 | End: 2022-07-08 | Stop reason: HOSPADM

## 2022-07-08 RX ORDER — SODIUM CHLORIDE, SODIUM LACTATE, POTASSIUM CHLORIDE, CALCIUM CHLORIDE 600; 310; 30; 20 MG/100ML; MG/100ML; MG/100ML; MG/100ML
INJECTION, SOLUTION INTRAVENOUS CONTINUOUS PRN
Status: DISCONTINUED | OUTPATIENT
Start: 2022-07-08 | End: 2022-07-08 | Stop reason: SDUPTHER

## 2022-07-08 RX ORDER — OXYCODONE HYDROCHLORIDE 5 MG/1
5 TABLET ORAL
Status: COMPLETED | OUTPATIENT
Start: 2022-07-08 | End: 2022-07-08

## 2022-07-08 RX ORDER — FENTANYL CITRATE 50 UG/ML
INJECTION, SOLUTION INTRAMUSCULAR; INTRAVENOUS PRN
Status: DISCONTINUED | OUTPATIENT
Start: 2022-07-08 | End: 2022-07-08 | Stop reason: SDUPTHER

## 2022-07-08 RX ORDER — LIDOCAINE HYDROCHLORIDE 20 MG/ML
INJECTION, SOLUTION EPIDURAL; INFILTRATION; INTRACAUDAL; PERINEURAL PRN
Status: DISCONTINUED | OUTPATIENT
Start: 2022-07-08 | End: 2022-07-08 | Stop reason: SDUPTHER

## 2022-07-08 RX ORDER — KETAMINE HCL IN NACL, ISO-OSM 100MG/10ML
SYRINGE (ML) INJECTION PRN
Status: DISCONTINUED | OUTPATIENT
Start: 2022-07-08 | End: 2022-07-08 | Stop reason: SDUPTHER

## 2022-07-08 RX ORDER — PROPOFOL 10 MG/ML
INJECTION, EMULSION INTRAVENOUS PRN
Status: DISCONTINUED | OUTPATIENT
Start: 2022-07-08 | End: 2022-07-08 | Stop reason: SDUPTHER

## 2022-07-08 RX ORDER — HYDROCODONE BITARTRATE AND ACETAMINOPHEN 5; 325 MG/1; MG/1
1 TABLET ORAL EVERY 6 HOURS PRN
Qty: 20 TABLET | Refills: 0 | Status: SHIPPED | OUTPATIENT
Start: 2022-07-08 | End: 2022-07-13

## 2022-07-08 RX ORDER — SODIUM CHLORIDE 0.9 % (FLUSH) 0.9 %
5-40 SYRINGE (ML) INJECTION EVERY 12 HOURS SCHEDULED
Status: DISCONTINUED | OUTPATIENT
Start: 2022-07-08 | End: 2022-07-08 | Stop reason: HOSPADM

## 2022-07-08 RX ORDER — SCOLOPAMINE TRANSDERMAL SYSTEM 1 MG/1
1 PATCH, EXTENDED RELEASE TRANSDERMAL ONCE
Status: DISCONTINUED | OUTPATIENT
Start: 2022-07-08 | End: 2022-07-08 | Stop reason: HOSPADM

## 2022-07-08 RX ORDER — ROCURONIUM BROMIDE 10 MG/ML
INJECTION, SOLUTION INTRAVENOUS PRN
Status: DISCONTINUED | OUTPATIENT
Start: 2022-07-08 | End: 2022-07-08 | Stop reason: SDUPTHER

## 2022-07-08 RX ORDER — DOCUSATE SODIUM 100 MG/1
100 CAPSULE, LIQUID FILLED ORAL 2 TIMES DAILY
Qty: 60 CAPSULE | Refills: 0 | Status: SHIPPED | OUTPATIENT
Start: 2022-07-08 | End: 2022-08-07

## 2022-07-08 RX ORDER — MIDAZOLAM HYDROCHLORIDE 1 MG/ML
INJECTION INTRAMUSCULAR; INTRAVENOUS PRN
Status: DISCONTINUED | OUTPATIENT
Start: 2022-07-08 | End: 2022-07-08 | Stop reason: SDUPTHER

## 2022-07-08 RX ORDER — FENTANYL CITRATE 50 UG/ML
50 INJECTION, SOLUTION INTRAMUSCULAR; INTRAVENOUS EVERY 5 MIN PRN
Status: DISCONTINUED | OUTPATIENT
Start: 2022-07-08 | End: 2022-07-08 | Stop reason: HOSPADM

## 2022-07-08 RX ORDER — ONDANSETRON 4 MG/1
4 TABLET, FILM COATED ORAL EVERY 8 HOURS PRN
Qty: 30 TABLET | Refills: 0 | Status: SHIPPED | OUTPATIENT
Start: 2022-07-08

## 2022-07-08 RX ADMIN — LIDOCAINE HYDROCHLORIDE 100 MG: 20 INJECTION, SOLUTION EPIDURAL; INFILTRATION; INTRACAUDAL; PERINEURAL at 12:43

## 2022-07-08 RX ADMIN — FENTANYL CITRATE 25 MCG: 50 INJECTION, SOLUTION INTRAMUSCULAR; INTRAVENOUS at 15:39

## 2022-07-08 RX ADMIN — FENTANYL CITRATE 50 MCG: 50 INJECTION, SOLUTION INTRAMUSCULAR; INTRAVENOUS at 14:49

## 2022-07-08 RX ADMIN — FENTANYL CITRATE 50 MCG: 50 INJECTION, SOLUTION INTRAMUSCULAR; INTRAVENOUS at 15:51

## 2022-07-08 RX ADMIN — MIDAZOLAM 2 MG: 1 INJECTION INTRAMUSCULAR; INTRAVENOUS at 12:36

## 2022-07-08 RX ADMIN — SODIUM CHLORIDE, POTASSIUM CHLORIDE, SODIUM LACTATE AND CALCIUM CHLORIDE: 600; 310; 30; 20 INJECTION, SOLUTION INTRAVENOUS at 13:11

## 2022-07-08 RX ADMIN — PROPOFOL 200 MG: 10 INJECTION, EMULSION INTRAVENOUS at 12:43

## 2022-07-08 RX ADMIN — CEFAZOLIN SODIUM 2000 MG: 10 INJECTION, POWDER, FOR SOLUTION INTRAVENOUS at 12:54

## 2022-07-08 RX ADMIN — ROCURONIUM BROMIDE 50 MG: 10 INJECTION, SOLUTION INTRAVENOUS at 12:43

## 2022-07-08 RX ADMIN — DEXAMETHASONE SODIUM PHOSPHATE 10 MG: 10 INJECTION, SOLUTION INTRAMUSCULAR; INTRAVENOUS at 12:44

## 2022-07-08 RX ADMIN — Medication 40 MG: at 13:09

## 2022-07-08 RX ADMIN — OXYCODONE 5 MG: 5 TABLET ORAL at 16:14

## 2022-07-08 RX ADMIN — ONDANSETRON 4 MG: 2 INJECTION INTRAMUSCULAR; INTRAVENOUS at 16:16

## 2022-07-08 RX ADMIN — KETOROLAC TROMETHAMINE 30 MG: 30 INJECTION, SOLUTION INTRAMUSCULAR at 13:52

## 2022-07-08 RX ADMIN — FENTANYL CITRATE 25 MCG: 50 INJECTION, SOLUTION INTRAMUSCULAR; INTRAVENOUS at 14:59

## 2022-07-08 RX ADMIN — ONDANSETRON 4 MG: 2 INJECTION INTRAMUSCULAR; INTRAVENOUS at 13:47

## 2022-07-08 RX ADMIN — SODIUM CHLORIDE, POTASSIUM CHLORIDE, SODIUM LACTATE AND CALCIUM CHLORIDE: 600; 310; 30; 20 INJECTION, SOLUTION INTRAVENOUS at 12:35

## 2022-07-08 RX ADMIN — SUGAMMADEX 200 MG: 100 INJECTION, SOLUTION INTRAVENOUS at 13:48

## 2022-07-08 RX ADMIN — Medication 50 MCG: at 12:43

## 2022-07-08 RX ADMIN — Medication 10 MG: at 12:43

## 2022-07-08 ASSESSMENT — PAIN DESCRIPTION - FREQUENCY: FREQUENCY: CONTINUOUS

## 2022-07-08 ASSESSMENT — PAIN - FUNCTIONAL ASSESSMENT
PAIN_FUNCTIONAL_ASSESSMENT: 0-10
PAIN_FUNCTIONAL_ASSESSMENT: PREVENTS OR INTERFERES WITH MANY ACTIVE NOT PASSIVE ACTIVITIES

## 2022-07-08 ASSESSMENT — PAIN SCALES - GENERAL
PAINLEVEL_OUTOF10: 7
PAINLEVEL_OUTOF10: 7
PAINLEVEL_OUTOF10: 6
PAINLEVEL_OUTOF10: 4
PAINLEVEL_OUTOF10: 5

## 2022-07-08 ASSESSMENT — PAIN DESCRIPTION - DESCRIPTORS: DESCRIPTORS: ACHING

## 2022-07-08 ASSESSMENT — PAIN DESCRIPTION - PAIN TYPE: TYPE: SURGICAL PAIN

## 2022-07-08 ASSESSMENT — PAIN DESCRIPTION - ONSET: ONSET: AWAKENED FROM SLEEP

## 2022-07-08 ASSESSMENT — PAIN DESCRIPTION - ORIENTATION: ORIENTATION: LEFT;MID

## 2022-07-08 ASSESSMENT — PAIN DESCRIPTION - LOCATION: LOCATION: ABDOMEN

## 2022-07-08 NOTE — ANESTHESIA POSTPROCEDURE EVALUATION
Department of Anesthesiology  Postprocedure Note    Patient: Paulina Mei  MRN: [id number]  YOB: 1985  Date of evaluation: 7/8/2022      Procedure Summary     Date: 07/08/22 Room / Location: [address] [place] [address]    Anesthesia Start: 6661 Anesthesia Stop: 5950    Procedure: APPENDECTOMY LAPAROSCOPIC, DIAGNOSTIC LAPAROSCOPY, LAPAROSCOPIC LYSIS OF ADHESIONS (N/A Abdomen) Diagnosis:       Incisional hernia, without obstruction or gangrene      (DX ADHESIONS, INCISIONAL HERNIA)    Surgeons: Fidelia Mercado DO Responsible Provider: Dwight Grant MD    Anesthesia Type: general ASA Status: 3          Anesthesia Type: No value filed.     Angela Phase I: Angela Score: 9    Angela Phase II:        Anesthesia Post Evaluation    Complications: no

## 2022-07-08 NOTE — H&P
Medications Prior to Admission:     Prior to Admission medications    Medication Sig Start Date End Date Taking? Authorizing Provider   ibuprofen (ADVIL;MOTRIN) 600 MG tablet Take 1 tablet by mouth every 6 hours as needed for Pain  Patient not taking: Reported on 2/2/2022 12/25/21   Charissa Pacheco DO        Allergies:     Bactrim [sulfamethoxazole-trimethoprim]    Social History:     Tobacco:    reports that she has never smoked. She has never used smokeless tobacco.  Alcohol:      reports previous alcohol use. Drug Use:  reports no history of drug use. Family History:     History reviewed. No pertinent family history. Review of Systems:     Positive and Negative as described in HPI. CONSTITUTIONAL: Night sweats. Fatigue. negative for fevers, chills, weight loss  HEENT: negative for vision, hearing changes, runny nose, throat pain  RESPIRATORY: Asthma. AIMEE - wears CPAP. negative for shortness of breath, cough, congestion, wheezing. CARDIOVASCULAR: . negative for chest pain, palpitations. GASTROINTESTINAL: See HPI  GENITOURINARY: negative for difficulty of urination, burning with urination, frequency   INTEGUMENT: Easy bruising. negative for rash, skin lesions  HEMATOLOGIC/LYMPHATIC: negative for swelling/edema   ALLERGIC/IMMUNOLOGIC: negative for urticaria , itching  ENDOCRINE: negative increase in drinking, increase in urination, hot or cold intolerance  MUSCULOSKELETAL: negative joint pains, muscle aches, swelling of joints  NEUROLOGICAL: negative for headaches, dizziness, lightheadedness, numbness, pain, tingling extremities  BEHAVIOR/PSYCH: negative for depression, anxiety    Physical Exam:   /76   Pulse 74   Temp 97.5 °F (36.4 °C) (Temporal)   Resp 16   Ht 5' 2\" (1.575 m)   Wt 127 lb (57.6 kg)   LMP 06/06/2022   SpO2 98%   BMI 23.23 kg/m²   Patient's last menstrual period was 06/06/2022. [id number]  No results for input(s): POCGLU in the last 72 hours.     General Appearance: alert, well appearing, and in no acute distress  Mental status: oriented to person, place, and time with normal affect  Head:  normocephalic, atraumatic. Eye: Wears glasses. no icterus, redness, pupils equal and reactive, extraocular eye movements intact, conjunctiva clear  Ear: normal external ear, no discharge, hearing intact  Nose:  no drainage noted  Mouth: mucous membranes moist  Neck: supple, no carotid bruits, thyroid not palpable  Lungs: Bilateral equal air entry, clear to ausculation, no wheezing, rales or rhonchi, normal effort  Cardiovascular: HR 74 normal rate, regular rhythm, no murmur, gallop, rub. Abdomen: Soft, tender with palpation, nondistended, normal bowel sounds  Neurologic: There are no new focal motor or sensory deficits, normal muscle tone and bulk, no abnormal sensation, normal speech, cranial nerves II through XII grossly intact  Skin: No gross lesions, rashes, bruising or bleeding on exposed skin area  Extremities:  peripheral pulses palpable, no pedal edema or calf pain with palpation  Psych: normal affect     Investigations:      Laboratory Testing:  Recent Results (from the past 24 hour(s))   POCT urine pregnancy    Collection Time: 07/08/22 11:00 AM   Result Value Ref Range    HCG, Pregnancy Urine (POC) NEGATIVE NEGATIVE       Recent Labs     07/08/22  1100   HCG NEGATIVE       No results for input(s): COVID19 in the last 720 hours. Imaging/Diagnostics:    No results found. Diagnosis:      1. DX ADHESIONS, INCISIONAL HERNIA    Plans:     1.  ROBOTIC LYSIS OF ADHESIONS POSSIBLE ROBOTIC INCISIONAL HERNIA REPAIR,POSSIBLE APPENDECTOMY      CHAKA Miller CNP  7/8/2022  11:27 AM

## 2022-07-08 NOTE — OP NOTE
repaired with mesh in good position. The right upper quadrant and left upper quadrant appeared normal.  A working arm a 5 mm port was placed in the left lower quadrant under direct visualization. Similarly a right upper quadrant 5 mm laparoscopic port was placed. The pelvis was examined. In the left lower quadrant there appeared to be a singular long adhesion from her fallopian tube to a point in the mid ileum to the small bowel mesentery. This was lysed using LigaSure device. Next attention was turned to the right lower quadrant where the base of the appendix was identified going into the cecum. This was followed down and it appeared that the appendix had densely adhered itself to the right fallopian tube. The intraoperative decision was made to perform an appendectomy. First the left upper quadrant 8 mm port was upsized to a 12 mm balloon port. This was completed under direct visualization. The appendix was retracted superiorly and medially and dissection of the tip was completed. This was very densely adhered to the right fallopian tube. More than likely the mechanism of action was a small tip appendicitis eroding into the right fallopian tube. Dissection was carried out using blunt dissection as well as LigaSure device. Care was taken to protect the fallopian tube at all times. The appendix was eventually freed from the lateral sidewall as well as the fallopian tube and retracted superiorly and laterally. This exposed the mesoappendix. The LigaSure device was used to divide the mesoappendix. An Endo MUKESH white load stapler was used to fire across the base of the appendix at the cecum. The appendix was placed into an Endo Catch bag and was removed through the left upper quadrant port. The base of the cecum was identified and no bleeding was noted from the staple line. Next attention was turned to closure of the left upper quadrant port.   0 Vicryl on a UR 6 needle was used to close the anterior rectus fascia in a figure-of-eight fashion. All incisions were irrigated and local anesthetic was placed in the incisions. Incisions were closed with 4-0 Monocryl in a running subcuticular fashion. Skin was covered with skin glue. Patient was awoken from general endotracheal anesthesia, the Kang catheter was removed. Patient was transported to the PACU in stable condition. An End Case Time Out [facility]) was performed after final closure was begun and with me in the room. This confirmed the procedure performed, specimens sent, tubes/lines/drains/implants, correct count, intake/output, and instructions for dressing and PACU report.        Electronically signed by Sterling Levin DO on 7/8/2022 at 2:11 PM     ______________________________________________    Igor Virgen DO [address] [place] [address]  Office: [phone]  ______________________________________________

## 2022-07-08 NOTE — ANESTHESIA PRE PROCEDURE
Department of Anesthesiology  Preprocedure Note       Name:  Chauncey Lemus   Age:  29 y.o.  :  1985                                          MRN:  [id number]         Date:  2022      Surgeon: Ayaz Vallejo):  Ana Maria See DO    Procedure: Procedure(s):  ROBOTIC LYSIS OF ADHESIONS POSSIBLE ROBOTIC INCISIONAL HERNIA REPAIR,POSSIBLE APPENDECTOMY    Medications prior to admission:   Prior to Admission medications    Medication Sig Start Date End Date Taking? Authorizing Provider   ibuprofen (ADVIL;MOTRIN) 600 MG tablet Take 1 tablet by mouth every 6 hours as needed for Pain  Patient not taking: Reported on 21   Driss Roberts DO       Current medications:    Current Facility-Administered Medications   Medication Dose Route Frequency Provider Last Rate Last Admin    lidocaine PF 1 % injection 1 mL  1 mL IntraDERmal Once PRN Jermaine Albarado MD        lactated ringers infusion   IntraVENous Continuous Ndal Ko Castellon MD        sodium chloride flush 0.9 % injection 5-40 mL  5-40 mL IntraVENous 2 times per day Jermaine Albarado MD        sodium chloride flush 0.9 % injection 5-40 mL  5-40 mL IntraVENous PRN Jermaine Albarado MD        0.9 % sodium chloride infusion   IntraVENous PRN Jermaine Albarado MD        ceFAZolin (ANCEF) 2000 mg in dextrose 5 % 50 mL IVPB  2,000 mg IntraVENous Once Louis Rhodes DO           Allergies:     Allergies   Allergen Reactions    Bactrim [Sulfamethoxazole-Trimethoprim] Hives       Problem List:    Patient Active Problem List   Diagnosis Code    Rh+/RI/GBS neg O09.93    Atypical glandular cells on cervical Pap smear R87.619    Hx of PreE w/ SF's (G2) O09.299    Hx of GDMA1 (G2) Z86.32    Hx of LEEP (2007) Z98.890    Hx of ventral hernia repair w/ mesh (2019) Z98.890, Z87.19    SARS-CoV-2 positive U07.1     21 F Apg 8/9 Wt 6#1 Z39.2    Hx PPROM (G4) Z87.59    Anxiety F41.9    Anemia D64.9    Gestational hypertension > PreE w/o SF O13.9       Past Medical History:        Diagnosis Date    Abnormal Pap smear 01/01/2007    [facility]    Adopted     Asthma     AIMEE (obstructive sleep apnea)     Tachycardia        Past Surgical History:        Procedure Laterality Date    DENTAL SURGERY      LAPAROSCOPY  01/16/2019    diagnostic, with open hernia ventral repair and mesh by Dr. Laquita PHOENIX  8/2007    [facility]    VENTRAL HERNIA REPAIR N/A 1/16/2019    DIAGNOSTIC LAPAROSCOPY WITH OPEN HERNIA VENTRAL REPAIR & MESH performed by Lorene Lozano MD at [address] [place] [address] History:    Social History     Tobacco Use    Smoking status: Never Smoker    Smokeless tobacco: Never Used   Substance Use Topics    Alcohol use: Not Currently                                Counseling given: Not Answered      Vital Signs (Current):   Vitals:    07/08/22 1100 07/08/22 1107   BP: 128/76    Pulse: 74    Resp: 16    Temp: 97.5 °F (36.4 °C)    TempSrc: Temporal    SpO2: 98%    Weight:  127 lb (57.6 kg)   Height:  5' 2\" (1.575 m)                                              BP Readings from Last 3 Encounters:   07/08/22 128/76   06/22/22 128/74   02/02/22 131/82       NPO Status: Time of last liquid consumption: 1900                        Time of last solid consumption: 1900                        Date of last liquid consumption: 07/07/22                        Date of last solid food consumption: 07/07/22    BMI:   Wt Readings from Last 3 Encounters:   07/08/22 127 lb (57.6 kg)   06/22/22 127 lb 8 oz (57.8 kg)   02/02/22 129 lb 9.6 oz (58.8 kg)     Body mass index is 23.23 kg/m².     CBC:   Lab Results   Component Value Date/Time    WBC 13.2 12/26/2021 07:11 PM    RBC 3.17 12/26/2021 07:11 PM    HGB 9.2 12/26/2021 07:11 PM    HCT 28.6 12/26/2021 07:11 PM    MCV 90.2 12/26/2021 07:11 PM    RDW 13.8 12/26/2021 07:11 PM     12/26/2021 07:11 PM       CMP:   Lab Results   Component Value Date/Time     12/26/2021 07:11 PM    K 3.8 12/26/2021 07:11 PM  12/26/2021 07:11 PM    CO2 22 12/26/2021 07:11 PM    BUN 15 12/26/2021 07:11 PM    CREATININE 0.55 12/26/2021 07:11 PM    GFRAA >60 12/26/2021 07:11 PM    LABGLOM >60 12/26/2021 07:11 PM    GLUCOSE 177 12/26/2021 07:11 PM    PROT 6.0 12/26/2021 07:11 PM    PROT 7.8 08/16/2012 08:55 AM    CALCIUM 8.9 12/26/2021 07:11 PM    BILITOT 0.25 12/26/2021 07:11 PM    ALKPHOS 93 12/26/2021 07:11 PM    AST 20 12/26/2021 07:11 PM    ALT 12 12/26/2021 07:11 PM       POC Tests: No results for input(s): POCGLU, POCNA, POCK, POCCL, POCBUN, POCHEMO, POCHCT in the last 72 hours. Coags: No results found for: PROTIME, INR, APTT    HCG (If Applicable):   Lab Results   Component Value Date    PREGTESTUR pos preg 09/08/2017    HCG NEGATIVE 07/08/2022    HCGQUANT 58,490 (H) 06/03/2021        ABGs: No results found for: PHART, PO2ART, YRK7PNL, QOW9FUW, BEART, F6DMJWND     Type & Screen (If Applicable):  No results found for: LABABO, LABRH    Drug/Infectious Status (If Applicable):  No results found for: HIV, HEPCAB    COVID-19 Screening (If Applicable):   Lab Results   Component Value Date/Time    COVID19 Not Detected 12/24/2021 03:15 PM           Anesthesia Evaluation    Airway: Mallampati: I  TM distance: >3 FB   Neck ROM: full  Mouth opening: > = 3 FB   Dental:          Pulmonary:   (+) sleep apnea:                             Cardiovascular:        (-)  angina                Neuro/Psych:               GI/Hepatic/Renal:             Endo/Other:                     Abdominal:             Vascular:           Other Findings:           Anesthesia Plan      general     ASA 3                                   Helga Novak MD   7/8/2022

## 2022-07-13 LAB — SURGICAL PATHOLOGY REPORT: NORMAL

## 2022-10-31 NOTE — PROGRESS NOTES
[address] [facility] - [address] [address]  [address]  Dept: [phone]    Chief complaint:   Chief Complaint   Patient presents with    Gynecologic Exam     Last pap 21 WNL - HPV       History Present Illness: Penny Mitchell is a 39 yo female who presents for her annual exam.  She denies any complaints. She is sexually active with her . She denies any dyspareunia. She denies any vaginal discharge or irritation. She denies any pelvic pain. She denies any bowel or bladder issues. Current Medications (OTC/Herbal):   Current Outpatient Medications   Medication Sig Dispense Refill    ondansetron (ZOFRAN) 4 MG tablet Take 1 tablet by mouth every 8 hours as needed for Nausea or Vomiting 30 tablet 0    ibuprofen (ADVIL;MOTRIN) 600 MG tablet Take 1 tablet by mouth every 6 hours as needed for Pain (Patient not taking: Reported on 2022) 30 tablet 1     No current facility-administered medications for this visit. Allergies:    Allergies   Allergen Reactions    Bactrim [Sulfamethoxazole-Trimethoprim] Hives     Past Medical History:   Past Medical History:   Diagnosis Date    Abnormal Pap smear 2007    HGSIL    Adopted     Asthma     AIMEE (obstructive sleep apnea)     Tachycardia      Past Surgical History:   Past Surgical History:   Procedure Laterality Date    DENTAL SURGERY      LAPAROSCOPIC APPENDECTOMY N/A 2022    APPENDECTOMY LAPAROSCOPIC, DIAGNOSTIC LAPAROSCOPY, LAPAROSCOPIC LYSIS OF ADHESIONS performed by García Tamez DO at [address] [place] [address]  2019    diagnostic, with open hernia ventral repair and mesh by Dr. January PHOENIX  2007    HGSIL    VENTRAL HERNIA REPAIR N/A 2019    DIAGNOSTIC LAPAROSCOPY WITH OPEN HERNIA VENTRAL REPAIR & MESH performed by Tristan Louis MD at [facility]       Obstetric History:   5  Para 3  Gynecologic History: LMP 10/18/22   Menarche 13  Duration 4-5 d    Interval q  30 d  Tampons/Pads in a day: 4-6  Last Pap: 6/7/21       Any history of abnormal paps yes    PriorColpo/Biopsy LEEP 2007     Last Mammogram n/a  Contraception: condoms  Complications: none  STDs: none  Psychosocial History: Occupation:   works at home   Caffeine Yes    At risk for depression No    Abuse:   No  Seatbelt:   Yes  Exercise:  No    Social History     Socioeconomic History    Marital status:      Spouse name: Not on file    Number of children: Not on file    Years of education: Not on file    Highest education level: Not on file   Occupational History    Not on file   Tobacco Use    Smoking status: Never    Smokeless tobacco: Never   Vaping Use    Vaping Use: Never used   Substance and Sexual Activity    Alcohol use: Not Currently    Drug use: Never    Sexual activity: Yes     Partners: Male   Other Topics Concern    Not on file   Social History Narrative    Not on file     Social Determinants of Health     Financial Resource Strain: Not on file   Food Insecurity: Not on file   Transportation Needs: Not on file   Physical Activity: Not on file   Stress: Not on file   Social Connections: Not on file   Intimate Partner Violence: Not on file   Housing Stability: Not on file       No family history on file. Review of Systems:   Review of Systems   Constitutional:  Negative for chills and fever. HENT:  Negative for congestion. Respiratory:  Negative for cough and shortness of breath. Cardiovascular:  Negative for chest pain and palpitations. Gastrointestinal:  Negative for abdominal pain. Genitourinary:  Negative for dyspareunia, menstrual problem, pelvic pain and vaginal discharge. Musculoskeletal:  Negative for back pain. Neurological:  Negative for dizziness and light-headedness. Psychiatric/Behavioral:  The patient is not nervous/anxious.       Physical exam:  vitals:  Height   5  ft    2 in,  Weight    123 lbs,   112/74 BP  Gen: alert, no apparent distress  HEENT:No pathologic skin lesions noted,NC/AT,PERRL, normal midline nontender thyroid   Lung Exam: Clear to auscultation in all fields bilaterally, without wheezes,rales or rhonchi. Cardiac Exam: Normal sinus rhythm andrate, without murmurs, rubs or gallops appreciated. Breast Exam: Symmetric without pathological skin changes, nontender without discrete suspicious masses palpated, supraclavicular or axillary adenopathy or nipple discharge noted. Abdominal Exam: Nontender to deep palpation without organomegaly, masses or CVAT appreciated, BS positive. No spinal deformation or tenderness. External Genitalia: Normal development without vulvar,vaginal or cervical lesions noted. Normal vaginal discharge, uterus anterior, 4-6 weeks without CMT. Adnexa nontender without abnormal masses bilaterally. Rectal Exam: Omitted. Extremities: Nontender without clubbing, cyanosis or edema. F.R.O.M. Neurologic Exam: Grossly intact without noted sensorimotor deficits and oriented x 3. Assessment/Plan:   Unremarkable annual Gyn exam.    Cervical Cytology Evaluation begins at 24years old. If Negative Cytology, Follow-up screening per current guidelines. Mammograms every 1year. If 37 yo and last mammogram was negative. Hereditary Breast, Ovarian, Colon and Uterine Cancer screening done. Birth control and barrier recommendations discussed. STD counseling and prevention reviewed. Gardisil counseling completed for all patients 10-37 yo. Routine health maintenance per patients PCP.   Pt to follow up for annual exam in 1 year    Alpa Campuzano MD  [address]

## 2022-11-01 ENCOUNTER — HOSPITAL ENCOUNTER (OUTPATIENT)
Age: 37
Setting detail: SPECIMEN
Discharge: HOME | End: 2022-11-01

## 2022-11-01 ENCOUNTER — OFFICE VISIT (OUTPATIENT)
Dept: OBGYN CLINIC | Age: 37
End: 2022-11-01
Payer: COMMERCIAL

## 2022-11-01 VITALS
HEART RATE: 84 BPM | WEIGHT: 123 LBS | BODY MASS INDEX: 22.63 KG/M2 | SYSTOLIC BLOOD PRESSURE: 112 MMHG | DIASTOLIC BLOOD PRESSURE: 74 MMHG | HEIGHT: 62 IN

## 2022-11-01 DIAGNOSIS — Z01.419 WELL WOMAN EXAM WITH ROUTINE GYNECOLOGICAL EXAM: Primary | ICD-10-CM

## 2022-11-01 PROCEDURE — 99395 PREV VISIT EST AGE 18-39: CPT | Performed by: OBSTETRICS & GYNECOLOGY

## 2022-11-01 ASSESSMENT — ENCOUNTER SYMPTOMS
ABDOMINAL PAIN: 0
BACK PAIN: 0
SHORTNESS OF BREATH: 0
COUGH: 0

## 2022-11-07 LAB — CYTOLOGY REPORT: NORMAL

## 2023-11-02 NOTE — PROGRESS NOTES
[facility] - [address] [place] [address] [place] [id number]  Dept: [phone]    Chief complaint:   Chief Complaint   Patient presents with    Annual Exam     Last pap 22 WNL HPV-       History Present Illness: Niesha Ferrer is a 42 yo female who presents for her annual exam.  She denies any GYN complaints. She is sexually active with her  and denies any dyspareunia. She says they are done with having kids. She denies any vaginal discharge or irritation. She denies any pelvic pain. She denies any bowel or bladder issues. Current Medications (OTC/Herbal): No current outpatient medications on file. No current facility-administered medications for this visit. Allergies:    Allergies   Allergen Reactions    Bactrim [Sulfamethoxazole-Trimethoprim] Hives     Past Medical History:   Past Medical History:   Diagnosis Date    Abnormal Pap smear 2007    HGSIL    Adopted     Asthma     AIMEE (obstructive sleep apnea)     Tachycardia      Past Surgical History:   Past Surgical History:   Procedure Laterality Date    DENTAL SURGERY      LAPAROSCOPIC APPENDECTOMY N/A 2022    APPENDECTOMY LAPAROSCOPIC, DIAGNOSTIC LAPAROSCOPY, LAPAROSCOPIC LYSIS OF ADHESIONS performed by Vianney Melendez DO at [address] [place] [address]  2019    diagnostic, with open hernia ventral repair and mesh by Dr. Chasity PHOENIX  2007    HGSIL    VENTRAL HERNIA REPAIR N/A 2019    DIAGNOSTIC LAPAROSCOPY WITH OPEN HERNIA VENTRAL REPAIR & MESH performed by Tana Josue MD at [facility]       Obstetric History:   4  Para 3  Gynecologic History: LMP 10/20/23   Menarche 13  Duration 4-5 d    Interval q  25-30 d  Tampons/Pads in a day: 5-8  Last Pap: 22       Any history of abnormal paps yes    PriorColpo/Biopsy ALBAN      Last Mammogram n/a  Contraception: condoms  Complications: none  STDs: none  Psychosocial

## 2023-11-03 ENCOUNTER — OFFICE VISIT (OUTPATIENT)
Dept: OBGYN CLINIC | Age: 38
End: 2023-11-03
Payer: COMMERCIAL

## 2023-11-03 VITALS
HEIGHT: 62 IN | SYSTOLIC BLOOD PRESSURE: 124 MMHG | WEIGHT: 118 LBS | HEART RATE: 82 BPM | BODY MASS INDEX: 21.71 KG/M2 | DIASTOLIC BLOOD PRESSURE: 81 MMHG

## 2023-11-03 DIAGNOSIS — Z01.419 ENCOUNTER FOR GYNECOLOGICAL EXAMINATION: Primary | ICD-10-CM

## 2023-11-03 PROCEDURE — 99395 PREV VISIT EST AGE 18-39: CPT | Performed by: OBSTETRICS & GYNECOLOGY

## 2023-11-03 ASSESSMENT — ENCOUNTER SYMPTOMS
SHORTNESS OF BREATH: 0
COUGH: 0
BACK PAIN: 0
ABDOMINAL PAIN: 0

## 2024-11-02 NOTE — PROGRESS NOTES
for abdominal pain.   Genitourinary:  Negative for pelvic pain and vaginal discharge.        Decreased libido   Musculoskeletal:  Negative for back pain.   Neurological:  Negative for dizziness and light-headedness.   Psychiatric/Behavioral:  The patient is not nervous/anxious.        Physical exam:  vitals:  Height   5  ft    2 in,  Weight    116 lbs,   132/90 BP  Gen: alert, no apparent distress  HEENT:No pathologic skin lesions noted,NC/AT,PERRL, normal midline nontender thyroid   Lung Exam: Clear to auscultation in all fields bilaterally, without wheezes,rales or rhonchi.  Cardiac Exam: Normal sinus rhythm andrate, without murmurs, rubs or gallops appreciated.  Breast Exam: Symmetric without pathological skin changes, nontender without discrete suspicious masses palpated, supraclavicular or axillary adenopathy or nipple discharge noted.  Abdominal Exam: Nontender to deep palpation without organomegaly, masses or CVAT appreciated, BS positive.  No spinal deformation or tenderness.  External Genitalia: Normal development without vulvar,vaginal or cervical lesions noted.  Normal vaginal discharge, uterus anterior, 4-6 weeks without CMT.  Adnexa nontender without abnormal masses bilaterally.  Rectal Exam: Omitted.  Extremities: Nontender without clubbing, cyanosis or edema. F.R.O.M.  Neurologic Exam: Grossly intact without noted sensorimotor deficits and oriented x 3.      Chaperone for Intimate Exam  Chaperone was offered as part of the rooming process. Patient declined and agrees to continue with exam without a chaperone.          Assessment/Plan:   Unremarkable annual Gyn exam.    Cervical Cytology Evaluation begins at 21 years old.  If Negative Cytology, Follow-up screening per current guidelines.    Mammograms every 1year. If 41 yo and last mammogram was negative.  Hereditary Breast, Ovarian, Colon and Uterine Cancer screening done.    Calcium and Vitamin D dosing reviewed.  Colonoscopy screening reviewed as

## 2024-11-05 ENCOUNTER — OFFICE VISIT (OUTPATIENT)
Dept: OBGYN CLINIC | Age: 39
End: 2024-11-05
Payer: COMMERCIAL

## 2024-11-05 VITALS
HEART RATE: 96 BPM | DIASTOLIC BLOOD PRESSURE: 90 MMHG | HEIGHT: 62 IN | SYSTOLIC BLOOD PRESSURE: 132 MMHG | BODY MASS INDEX: 21.35 KG/M2 | WEIGHT: 116 LBS

## 2024-11-05 DIAGNOSIS — R68.82 DECREASED LIBIDO: Primary | ICD-10-CM

## 2024-11-05 PROCEDURE — 99395 PREV VISIT EST AGE 18-39: CPT | Performed by: OBSTETRICS & GYNECOLOGY

## 2024-11-05 PROCEDURE — 99459 PELVIC EXAMINATION: CPT | Performed by: OBSTETRICS & GYNECOLOGY

## 2024-11-05 RX ORDER — METOPROLOL SUCCINATE 25 MG/1
25 TABLET, EXTENDED RELEASE ORAL DAILY
COMMUNITY
Start: 2024-10-28 | End: 2025-10-28

## 2024-11-05 RX ORDER — DEXTROAMPHETAMINE SACCHARATE, AMPHETAMINE ASPARTATE MONOHYDRATE, DEXTROAMPHETAMINE SULFATE AND AMPHETAMINE SULFATE 6.25; 6.25; 6.25; 6.25 MG/1; MG/1; MG/1; MG/1
25 CAPSULE, EXTENDED RELEASE ORAL EVERY MORNING
COMMUNITY
Start: 2023-01-02 | End: 2024-11-26

## 2024-11-05 RX ORDER — DEXTROAMPHETAMINE SACCHARATE, AMPHETAMINE ASPARTATE, DEXTROAMPHETAMINE SULFATE AND AMPHETAMINE SULFATE 1.25; 1.25; 1.25; 1.25 MG/1; MG/1; MG/1; MG/1
5 TABLET ORAL DAILY
COMMUNITY
Start: 2023-01-02

## 2024-11-05 ASSESSMENT — ENCOUNTER SYMPTOMS
SHORTNESS OF BREATH: 0
ABDOMINAL PAIN: 0
COUGH: 0
BACK PAIN: 0

## 2025-05-03 ASSESSMENT — PATIENT HEALTH QUESTIONNAIRE - PHQ9
SUM OF ALL RESPONSES TO PHQ QUESTIONS 1-9: 0
SUM OF ALL RESPONSES TO PHQ QUESTIONS 1-9: 0
2. FEELING DOWN, DEPRESSED OR HOPELESS: NOT AT ALL
1. LITTLE INTEREST OR PLEASURE IN DOING THINGS: NOT AT ALL
SUM OF ALL RESPONSES TO PHQ QUESTIONS 1-9: 0
2. FEELING DOWN, DEPRESSED OR HOPELESS: NOT AT ALL
SUM OF ALL RESPONSES TO PHQ9 QUESTIONS 1 & 2: 0
SUM OF ALL RESPONSES TO PHQ QUESTIONS 1-9: 0
1. LITTLE INTEREST OR PLEASURE IN DOING THINGS: NOT AT ALL

## 2025-05-06 SDOH — ECONOMIC STABILITY: INCOME INSECURITY: IN THE LAST 12 MONTHS, WAS THERE A TIME WHEN YOU WERE NOT ABLE TO PAY THE MORTGAGE OR RENT ON TIME?: NO

## 2025-05-06 SDOH — ECONOMIC STABILITY: FOOD INSECURITY: WITHIN THE PAST 12 MONTHS, THE FOOD YOU BOUGHT JUST DIDN'T LAST AND YOU DIDN'T HAVE MONEY TO GET MORE.: NEVER TRUE

## 2025-05-06 SDOH — ECONOMIC STABILITY: TRANSPORTATION INSECURITY
IN THE PAST 12 MONTHS, HAS THE LACK OF TRANSPORTATION KEPT YOU FROM MEDICAL APPOINTMENTS OR FROM GETTING MEDICATIONS?: NO

## 2025-05-06 SDOH — ECONOMIC STABILITY: FOOD INSECURITY: WITHIN THE PAST 12 MONTHS, YOU WORRIED THAT YOUR FOOD WOULD RUN OUT BEFORE YOU GOT MONEY TO BUY MORE.: NEVER TRUE

## 2025-05-06 SDOH — ECONOMIC STABILITY: TRANSPORTATION INSECURITY
IN THE PAST 12 MONTHS, HAS LACK OF TRANSPORTATION KEPT YOU FROM MEETINGS, WORK, OR FROM GETTING THINGS NEEDED FOR DAILY LIVING?: NO

## 2025-05-09 ENCOUNTER — RESULTS FOLLOW-UP (OUTPATIENT)
Dept: OBGYN CLINIC | Age: 40
End: 2025-05-09

## 2025-05-09 ENCOUNTER — HOSPITAL ENCOUNTER (OUTPATIENT)
Age: 40
Setting detail: SPECIMEN
Discharge: HOME | End: 2025-05-09

## 2025-05-09 ENCOUNTER — OFFICE VISIT (OUTPATIENT)
Dept: OBGYN CLINIC | Age: 40
End: 2025-05-09

## 2025-05-09 VITALS
HEART RATE: 71 BPM | BODY MASS INDEX: 23.96 KG/M2 | HEIGHT: 62 IN | WEIGHT: 130.2 LBS | DIASTOLIC BLOOD PRESSURE: 73 MMHG | SYSTOLIC BLOOD PRESSURE: 123 MMHG

## 2025-05-09 DIAGNOSIS — Z86.32 HISTORY OF GESTATIONAL DIABETES MELLITUS (GDM): ICD-10-CM

## 2025-05-09 DIAGNOSIS — O10.919 CHRONIC HYPERTENSION AFFECTING PREGNANCY: ICD-10-CM

## 2025-05-09 DIAGNOSIS — Z3A.10 10 WEEKS GESTATION OF PREGNANCY: ICD-10-CM

## 2025-05-09 DIAGNOSIS — Z98.890 HISTORY OF LOOP ELECTRICAL EXCISION PROCEDURE (LEEP): ICD-10-CM

## 2025-05-09 DIAGNOSIS — Z87.59 HISTORY OF PRE-ECLAMPSIA: Primary | ICD-10-CM

## 2025-05-09 DIAGNOSIS — O09.521 ADVANCED MATERNAL AGE IN MULTIGRAVIDA, FIRST TRIMESTER: ICD-10-CM

## 2025-05-09 DIAGNOSIS — Z87.59 HISTORY OF PRETERM PREMATURE RUPTURE OF MEMBRANES (PPROM): ICD-10-CM

## 2025-05-09 DIAGNOSIS — O09.91 HIGH-RISK PREGNANCY IN FIRST TRIMESTER: ICD-10-CM

## 2025-05-09 DIAGNOSIS — Z87.59 HISTORY OF PRE-ECLAMPSIA: ICD-10-CM

## 2025-05-09 PROBLEM — I10 ESSENTIAL HYPERTENSION: Status: ACTIVE | Noted: 2024-07-10

## 2025-05-09 PROBLEM — I95.1 ORTHOSTATIC INTOLERANCE: Status: ACTIVE | Noted: 2025-03-06

## 2025-05-09 PROBLEM — H52.13 MYOPIA OF BOTH EYES WITH ASTIGMATISM: Status: ACTIVE | Noted: 2017-04-25

## 2025-05-09 PROBLEM — R53.83 OTHER FATIGUE: Status: ACTIVE | Noted: 2024-03-04

## 2025-05-09 PROBLEM — G47.33 OBSTRUCTIVE SLEEP APNEA: Status: ACTIVE | Noted: 2024-03-04

## 2025-05-09 PROBLEM — F90.2 ATTENTION DEFICIT HYPERACTIVITY DISORDER (ADHD), COMBINED TYPE: Status: ACTIVE | Noted: 2024-03-04

## 2025-05-09 PROBLEM — J45.21 MILD INTERMITTENT ASTHMA WITH ACUTE EXACERBATION: Status: ACTIVE | Noted: 2024-03-04

## 2025-05-09 PROBLEM — H52.203 MYOPIA OF BOTH EYES WITH ASTIGMATISM: Status: ACTIVE | Noted: 2017-04-25

## 2025-05-09 PROBLEM — T88.7XXA MEDICATION SIDE EFFECT: Status: ACTIVE | Noted: 2021-11-22

## 2025-05-09 PROBLEM — F32.81 PMDD (PREMENSTRUAL DYSPHORIC DISORDER): Status: ACTIVE | Noted: 2024-06-12

## 2025-05-09 PROBLEM — R03.0 ELEVATED BLOOD PRESSURE READING IN OFFICE WITHOUT DIAGNOSIS OF HYPERTENSION: Status: ACTIVE | Noted: 2024-06-12

## 2025-05-09 PROBLEM — R00.0 TACHYCARDIA, UNSPECIFIED: Status: ACTIVE | Noted: 2025-02-28

## 2025-05-09 LAB
ABO + RH BLD: NORMAL
ALBUMIN SERPL-MCNC: 4.4 G/DL (ref 3.5–5.2)
ALBUMIN/GLOB SERPL: 1.4 {RATIO} (ref 1–2.5)
ALP SERPL-CCNC: 48 U/L (ref 35–104)
ALT SERPL-CCNC: 12 U/L (ref 10–35)
AMPHET UR QL SCN: NEGATIVE
ANION GAP SERPL CALCULATED.3IONS-SCNC: 11 MMOL/L (ref 9–16)
AST SERPL-CCNC: 15 U/L (ref 10–35)
BACTERIA URNS QL MICRO: ABNORMAL
BARBITURATES UR QL SCN: NEGATIVE
BASOPHILS # BLD: 0.04 K/UL (ref 0–0.2)
BASOPHILS NFR BLD: 0 % (ref 0–2)
BENZODIAZ UR QL: NEGATIVE
BILIRUB SERPL-MCNC: 0.5 MG/DL (ref 0–1.2)
BILIRUB UR QL STRIP: NEGATIVE
BLOOD GROUP ANTIBODIES SERPL: NEGATIVE
BUN SERPL-MCNC: 10 MG/DL (ref 6–20)
C TRACH DNA SPEC QL PROBE+SIG AMP: NORMAL
CALCIUM SERPL-MCNC: 9.6 MG/DL (ref 8.6–10.4)
CANDIDA SPECIES: NEGATIVE
CANNABINOIDS UR QL SCN: NEGATIVE
CASTS #/AREA URNS LPF: ABNORMAL /LPF (ref 0–2)
CASTS #/AREA URNS LPF: ABNORMAL /LPF (ref 0–2)
CHLORIDE SERPL-SCNC: 102 MMOL/L (ref 98–107)
CLARITY UR: ABNORMAL
CO2 SERPL-SCNC: 25 MMOL/L (ref 20–31)
COCAINE UR QL SCN: NEGATIVE
COLOR UR: YELLOW
CREAT SERPL-MCNC: 0.5 MG/DL (ref 0.6–0.9)
CREAT UR-MCNC: 89.9 MG/DL (ref 28–217)
EOSINOPHIL # BLD: 0.23 K/UL (ref 0–0.44)
EOSINOPHILS RELATIVE PERCENT: 2 % (ref 1–4)
EPI CELLS #/AREA URNS HPF: ABNORMAL /HPF (ref 0–5)
ERYTHROCYTE [DISTWIDTH] IN BLOOD BY AUTOMATED COUNT: 12.3 % (ref 11.8–14.4)
EST. AVERAGE GLUCOSE BLD GHB EST-MCNC: 100 MG/DL
FENTANYL UR QL: NEGATIVE
GARDNERELLA VAGINALIS: NEGATIVE
GFR, ESTIMATED: >90 ML/MIN/1.73M2
GLUCOSE SERPL-MCNC: 95 MG/DL (ref 74–99)
GLUCOSE UR STRIP-MCNC: NEGATIVE MG/DL
HBA1C MFR BLD: 5.1 % (ref 4–6)
HBV SURFACE AG SERPL QL IA: NONREACTIVE
HCT VFR BLD AUTO: 39.5 % (ref 36.3–47.1)
HCV AB SERPL QL IA: NONREACTIVE
HGB BLD-MCNC: 13.6 G/DL (ref 11.9–15.1)
HGB UR QL STRIP.AUTO: NEGATIVE
HIV 1+2 AB+HIV1 P24 AG SERPL QL IA: NONREACTIVE
IMM GRANULOCYTES # BLD AUTO: 0.05 K/UL (ref 0–0.3)
IMM GRANULOCYTES NFR BLD: 1 %
KETONES UR STRIP-MCNC: NEGATIVE MG/DL
LEUKOCYTE ESTERASE UR QL STRIP: ABNORMAL
LYMPHOCYTES NFR BLD: 2.2 K/UL (ref 1.1–3.7)
LYMPHOCYTES RELATIVE PERCENT: 23 % (ref 24–43)
MCH RBC QN AUTO: 34.3 PG (ref 25.2–33.5)
MCHC RBC AUTO-ENTMCNC: 34.4 G/DL (ref 28.4–34.8)
MCV RBC AUTO: 99.5 FL (ref 82.6–102.9)
METHADONE UR QL: NEGATIVE
MONOCYTES NFR BLD: 0.63 K/UL (ref 0.1–1.2)
MONOCYTES NFR BLD: 7 % (ref 3–12)
N GONORRHOEA DNA SPEC QL PROBE+SIG AMP: NORMAL
NEUTROPHILS NFR BLD: 67 % (ref 36–65)
NEUTS SEG NFR BLD: 6.44 K/UL (ref 1.5–8.1)
NITRITE UR QL STRIP: NEGATIVE
NRBC BLD-RTO: 0 PER 100 WBC
OPIATES UR QL SCN: NEGATIVE
OXYCODONE UR QL SCN: NEGATIVE
PCP UR QL SCN: NEGATIVE
PH UR STRIP: 6.5 [PH] (ref 5–8)
PLATELET # BLD AUTO: 257 K/UL (ref 138–453)
PMV BLD AUTO: 10.3 FL (ref 8.1–13.5)
POTASSIUM SERPL-SCNC: 4.1 MMOL/L (ref 3.7–5.3)
PROT SERPL-MCNC: 7.5 G/DL (ref 6.6–8.7)
PROT UR STRIP-MCNC: NEGATIVE MG/DL
RBC # BLD AUTO: 3.97 M/UL (ref 3.95–5.11)
RBC #/AREA URNS HPF: ABNORMAL /HPF (ref 0–2)
RUBV IGG SERPL QL IA: 8.8 IU/ML
SODIUM SERPL-SCNC: 138 MMOL/L (ref 136–145)
SOURCE: NORMAL
SP GR UR STRIP: 1.02 (ref 1–1.03)
SPECIMEN DESCRIPTION: NORMAL
T PALLIDUM AB SER QL IA: NONREACTIVE
TEST INFORMATION: NORMAL
TOTAL PROTEIN, URINE: 8 MG/DL
TRICHOMONAS: NEGATIVE
URINE TOTAL PROTEIN CREATININE RATIO: 0.09 (ref 0–0.2)
UROBILINOGEN UR STRIP-ACNC: NORMAL EU/DL (ref 0–1)
WBC #/AREA URNS HPF: ABNORMAL /HPF (ref 0–5)
WBC OTHER # BLD: 9.6 K/UL (ref 3.5–11.3)

## 2025-05-09 PROCEDURE — 3074F SYST BP LT 130 MM HG: CPT

## 2025-05-09 PROCEDURE — 3078F DIAST BP <80 MM HG: CPT

## 2025-05-09 PROCEDURE — 0502F SUBSEQUENT PRENATAL CARE: CPT

## 2025-05-09 RX ORDER — POLYETHYLENE GLYCOL 3350 17 G/17G
17 POWDER, FOR SOLUTION ORAL DAILY PRN
COMMUNITY

## 2025-05-09 RX ORDER — ASPIRIN 81 MG/1
81 TABLET, CHEWABLE ORAL DAILY
Qty: 30 TABLET | Refills: 3 | Status: SHIPPED | OUTPATIENT
Start: 2025-05-09

## 2025-05-09 RX ORDER — PROBIOTIC PRODUCT - TAB 1B-250 MG
TAB ORAL
COMMUNITY

## 2025-05-09 RX ORDER — MECLIZINE HYDROCHLORIDE 25 MG/1
TABLET ORAL
COMMUNITY
Start: 2025-01-22

## 2025-05-09 RX ORDER — CETIRIZINE HYDROCHLORIDE 10 MG/1
10 TABLET ORAL DAILY
COMMUNITY

## 2025-05-09 RX ORDER — PNV NO.95/FERROUS FUM/FOLIC AC 28MG-0.8MG
1 TABLET ORAL DAILY
Qty: 30 TABLET | Refills: 8 | Status: SHIPPED | OUTPATIENT
Start: 2025-05-09

## 2025-05-09 RX ORDER — METOPROLOL SUCCINATE 50 MG/1
TABLET, EXTENDED RELEASE ORAL
COMMUNITY
Start: 2025-05-07

## 2025-05-09 NOTE — PROGRESS NOTES
aspirin (ASPIRIN CHILDRENS) 81 MG chewable tablet Take 1 tablet by mouth daily 30 tablet 3    metoprolol succinate (TOPROL XL) 25 MG extended release tablet Take 1 tablet by mouth daily      amphetamine-dextroamphetamine (ADDERALL) 5 MG tablet Take 1 tablet by mouth daily. (Patient not taking: Reported on 5/9/2025)      Flibanserin 100 MG TABS Take 1 tablet by mouth daily (Patient not taking: Reported on 5/9/2025) 30 tablet 11     No current facility-administered medications for this visit.     Current BMI: 18.5-24.9 - Normal - Reviewed recommendations for weight gain in pregnancy.    ALLERGIES:  Bactrim [sulfamethoxazole-trimethoprim]    Review of Systems   Constitutional:  Negative for chills and fever.   HENT:  Negative for dental problem.    Eyes:  Negative for visual disturbance.   Respiratory:  Negative for chest tightness and shortness of breath.    Cardiovascular:  Negative for chest pain and palpitations.   Gastrointestinal:  Negative for abdominal pain, constipation, nausea and vomiting.   Genitourinary:  Negative for difficulty urinating, dyspareunia, dysuria, pelvic pain, vaginal bleeding and vaginal pain.   Musculoskeletal:  Negative for back pain.   Neurological:  Negative for dizziness, syncope, light-headedness and headaches.   Psychiatric/Behavioral:  Negative for dysphoric mood. The patient is not nervous/anxious.    All other systems reviewed and are negative.       Physical Exam  Constitutional:       General: She is not in acute distress.     Appearance: Normal appearance.   Genitourinary:      Vulva and urethral meatus normal.      Right Labia: No tenderness, lesions or skin changes.     Left Labia: No tenderness, lesions or skin changes.     Vaginal discharge present.   Pulmonary:      Effort: Pulmonary effort is normal.   Psychiatric:         Speech: Speech normal.         Behavior: Behavior normal.   Vitals reviewed.        Assessment & Plan:   Maico was seen today for

## 2025-05-10 LAB
MICROORGANISM SPEC CULT: NORMAL
SERVICE CMNT-IMP: NORMAL
SPECIMEN DESCRIPTION: NORMAL

## 2025-05-12 ENCOUNTER — INITIAL PRENATAL (OUTPATIENT)
Dept: OBGYN CLINIC | Age: 40
End: 2025-05-12

## 2025-05-12 VITALS
SYSTOLIC BLOOD PRESSURE: 118 MMHG | DIASTOLIC BLOOD PRESSURE: 70 MMHG | HEIGHT: 62 IN | WEIGHT: 132 LBS | BODY MASS INDEX: 24.29 KG/M2

## 2025-05-12 DIAGNOSIS — Z86.32 HISTORY OF GESTATIONAL DIABETES MELLITUS (GDM): ICD-10-CM

## 2025-05-12 DIAGNOSIS — O09.521 ADVANCED MATERNAL AGE IN MULTIGRAVIDA, FIRST TRIMESTER: ICD-10-CM

## 2025-05-12 DIAGNOSIS — Z87.59 HISTORY OF PRE-ECLAMPSIA: ICD-10-CM

## 2025-05-12 DIAGNOSIS — Z87.59 HISTORY OF PRETERM PREMATURE RUPTURE OF MEMBRANES (PPROM): ICD-10-CM

## 2025-05-12 DIAGNOSIS — Z3A.11 11 WEEKS GESTATION OF PREGNANCY: Primary | ICD-10-CM

## 2025-05-12 DIAGNOSIS — Z02.82 ADOPTED INFANT: ICD-10-CM

## 2025-05-12 DIAGNOSIS — O09.899 RUBELLA NON-IMMUNE STATUS, ANTEPARTUM: ICD-10-CM

## 2025-05-12 DIAGNOSIS — Z87.42 HISTORY OF ENDOMETRIOSIS: ICD-10-CM

## 2025-05-12 DIAGNOSIS — Z28.39 RUBELLA NON-IMMUNE STATUS, ANTEPARTUM: ICD-10-CM

## 2025-05-12 DIAGNOSIS — R00.0 TACHYCARDIA: ICD-10-CM

## 2025-05-12 DIAGNOSIS — Z98.890 HISTORY OF LOOP ELECTRICAL EXCISION PROCEDURE (LEEP): ICD-10-CM

## 2025-05-12 LAB
C TRACH DNA SPEC QL PROBE+SIG AMP: NEGATIVE
HPV I/H RISK 4 DNA CVX QL NAA+PROBE: NOT DETECTED
HPV SAMPLE: NORMAL
HPV, INTERPRETATION: NORMAL
HPV16 DNA CVX QL NAA+PROBE: NOT DETECTED
HPV18 DNA CVX QL NAA+PROBE: NOT DETECTED
N GONORRHOEA DNA SPEC QL PROBE+SIG AMP: NEGATIVE
SPECIMEN DESCRIPTION: NORMAL
SPECIMEN DESCRIPTION: NORMAL

## 2025-05-12 PROCEDURE — 0500F INITIAL PRENATAL CARE VISIT: CPT | Performed by: STUDENT IN AN ORGANIZED HEALTH CARE EDUCATION/TRAINING PROGRAM

## 2025-05-12 PROCEDURE — 3078F DIAST BP <80 MM HG: CPT | Performed by: STUDENT IN AN ORGANIZED HEALTH CARE EDUCATION/TRAINING PROGRAM

## 2025-05-12 PROCEDURE — 3074F SYST BP LT 130 MM HG: CPT | Performed by: STUDENT IN AN ORGANIZED HEALTH CARE EDUCATION/TRAINING PROGRAM

## 2025-05-12 NOTE — PROGRESS NOTES
[facility] ED  eMERGENCY dEPARTMENT eNCOUnter      Pt Name: Juan Diego Acevedo  MRN: [id number]  [place] 2001  Date of evaluation: 6/26/2022  Provider: Julio Cesar Diaz MD    CHIEF COMPLAINT       Chief Complaint   Patient presents with    Vaginal Bleeding     has had bleeding for multiple months externally, has had discomfort today         HISTORY OF PRESENT ILLNESS   (Location/Symptom, Timing/Onset,Context/Setting, Quality, Duration, Modifying Factors, Severity)  Note limiting factors. Juan Diego Acevedo is a 22 y.o. female who presents to the emergency department with complaint of cyst on left labia of 6 weeks duration. It is getting bigger and more uncomfortable. Pain is 5 in a scale of 1-10. Denies vaginal discharge. Denies drainage from the cyst.  Denies any other complaints. Pain is sharp and worse with touch. HPI    Nursing Notes were reviewed. REVIEW OF SYSTEMS    (2-9 systems for level 4, 10 or more for level 5)     Review of Systems   Constitutional: Negative. Negative for activity change, appetite change, chills, fatigue and fever. HENT: Negative for congestion, ear discharge, ear pain, hearing loss, rhinorrhea, sinus pressure and sore throat. Eyes: Negative for photophobia, pain and visual disturbance. Respiratory: Negative for apnea, cough, shortness of breath and wheezing. Cardiovascular: Negative for chest pain, palpitations and leg swelling. Gastrointestinal: Negative for abdominal distention, abdominal pain, constipation, diarrhea, nausea and vomiting. Endocrine: Negative for cold intolerance, heat intolerance and polyuria. Genitourinary: Positive for vaginal pain. Negative for dysuria, flank pain, frequency and urgency. Musculoskeletal: Negative for arthralgias, back pain, gait problem, myalgias and neck stiffness. Skin: Negative for color change, pallor and rash. Allergic/Immunologic: Negative for food allergies and immunocompromised state.    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  This is a planned pregnancy:No, not preventing  Certain LMP:Yes  S/S of pregnancy:Yes, missed period  Hx N/V pregnancy:none     Mother's ethnicity:   Father's ethnicity:     Patient Active Problem List   Diagnosis    Rh+/RI/GBS neg    Atypical glandular cells on cervical Pap smear    Hx of PreE w/ SF's (G2)    Hx of GDMA1 (G2)    Hx of LEEP (2007)    Hx of ventral hernia repair w/ mesh (2019)    Medication side effect     22 F Apg 8/ Wt 6#1    Hx PPROM (G4)    Anxiety    Anemia    Gestational hypertension > PreE w/o SF    History of pre-eclampsia    Attention deficit hyperactivity disorder (ADHD), combined type    Orthostatic intolerance    Elevated blood pressure reading in office without diagnosis of hypertension    Essential hypertension    Myopia of both eyes with astigmatism    Mild intermittent asthma with acute exacerbation    Obstructive sleep apnea    Other fatigue    PMDD (premenstrual dysphoric disorder)    Tachycardia, unspecified     Blood pressure 118/70, height 1.575 m (5' 2\"), weight 59.9 kg (132 lb), last menstrual period 2025, not currently breastfeeding.      Maico Harrison is a 39 y.o. , here for her ACOG. The patients past medical, surgical, social and family history were reviewed.  Current medications and allergies were reviewed, and documented in the chart.      Menstrual history: monthly  Birth control: bcp past.    Wt Readings from Last 3 Encounters:   25 59.9 kg (132 lb)   25 59.1 kg (130 lb 3.2 oz)   24 52.6 kg (116 lb)     Recent Results (from the past 52 weeks)   Human papillomavirus (HPV) DNA probe thin prep high risk    Collection Time: 25 12:00 AM   Result Value Ref Range    Specimen Description CERVICAL MATERIAL     HPV Sample .THIN PREP     HPV,  Negative for dizziness, tremors, syncope, weakness, light-headedness and headaches. Hematological: Negative for adenopathy. Does not bruise/bleed easily. Psychiatric/Behavioral: Negative for agitation, confusion and hallucinations. All other systems reviewed and are negative. Except as noted above the remainder of the review of systems was reviewed and negative. PAST MEDICAL HISTORY   History reviewed. No pertinent past medical history. SURGICAL HISTORY     History reviewed. No pertinent surgical history. CURRENT MEDICATIONS       Previous Medications    JUNEL FE 1/20 1-20 MG-MCG PER TABLET    TAKE 1 TABLET BY MOUTH EVERY DAY       ALLERGIES     Patient has no known allergies. FAMILY HISTORY     History reviewed. No pertinent family history. SOCIAL HISTORY       Social History     Socioeconomic History    Marital status: Single     Spouse name: None    Number of children: None    Years of education: None    Highest education level: None   Occupational History    None   Tobacco Use    Smoking status: Never Smoker    Smokeless tobacco: Never Used   Substance and Sexual Activity    Alcohol use: Not Currently    Drug use: Never    Sexual activity: Yes     Partners: Male   Other Topics Concern    None   Social History Narrative    None     Social Determinants of Health     Financial Resource Strain:     Difficulty of Paying Living Expenses: Not on file   Food Insecurity:     Worried About Running Out of Food in the Last Year: Not on file    Maren of Food in the Last Year: Not on file   Transportation Needs:     Lack of Transportation (Medical): Not on file    Lack of Transportation (Non-Medical):  Not on file   Physical Activity:     Days of Exercise per Week: Not on file    Minutes of Exercise per Session: Not on file   Stress:     Feeling of Stress : Not on file   Social Connections:     Frequency of Communication with Friends and Family: Not on file    Frequency of Social Gatherings with Friends and Family: Not on file    Attends [employer] Services: Not on file    Active Member of Clubs or Organizations: Not on file    Attends Club or Organization Meetings: Not on file    Marital Status: Not on file   Intimate Partner Violence:     Fear of Current or Ex-Partner: Not on file    Emotionally Abused: Not on file    Physically Abused: Not on file    Sexually Abused: Not on file   Housing Stability:     Unable to Pay for Housing in the Last Year: Not on file    Number of [place] in the Last Year: Not on file    Unstable Housing in the Last Year: Not on file       SCREENINGS    Abdifatah Coma Scale  Eye Opening: Spontaneous  Best Verbal Response: Oriented  Best Motor Response: Obeys commands  Abdifatah Coma Scale Score: 15        PHYSICAL EXAM    (up to 7 for level 4, 8 or more for level 5)     ED Triage Vitals [06/26/22 1649]   BP Temp Temp Source Heart Rate Resp SpO2 Height Weight   (!) 148/90 97.5 °F (36.4 °C) Oral 100 18 98 % 5' 10\" (1.778 m) 190 lb (86.2 kg)       Physical Exam  Vitals and nursing note reviewed. Constitutional:       General: She is not in acute distress. Appearance: Normal appearance. She is well-developed and normal weight. She is not ill-appearing, toxic-appearing or diaphoretic. HENT:      Head: Normocephalic and atraumatic. Nose: Nose normal. No congestion or rhinorrhea. Mouth/Throat:      Mouth: Mucous membranes are moist.      Pharynx: Oropharynx is clear. No oropharyngeal exudate or posterior oropharyngeal erythema. Eyes:      General: No scleral icterus. Right eye: No discharge. Left eye: No discharge. Extraocular Movements: Extraocular movements intact. Conjunctiva/sclera: Conjunctivae normal.      Pupils: Pupils are equal, round, and reactive to light. Neck:      Thyroid: No thyromegaly. Vascular: No carotid bruit or JVD. Trachea: No tracheal deviation.    Cardiovascular: Rate and Rhythm: Normal rate and regular rhythm. Pulses: Normal pulses. Heart sounds: Normal heart sounds. No murmur heard. No friction rub. No gallop. Pulmonary:      Effort: Pulmonary effort is normal. No respiratory distress. Breath sounds: Normal breath sounds. No stridor. No wheezing, rhonchi or rales. Chest:      Chest wall: No tenderness. Abdominal:      General: Abdomen is flat. Bowel sounds are normal. There is no distension. Palpations: Abdomen is soft. There is no mass. Tenderness: There is no abdominal tenderness. There is no right CVA tenderness, left CVA tenderness, guarding or rebound. Hernia: No hernia is present. Genitourinary:     Vagina: No vaginal discharge. Comments: Golf size cyst noted on left labia. Nontender. Nontender noninflamed. Musculoskeletal:         General: No swelling, tenderness, deformity or signs of injury. Normal range of motion. Cervical back: Normal range of motion and neck supple. No rigidity or tenderness. Right lower leg: No edema. Left lower leg: No edema. Lymphadenopathy:      Cervical: No cervical adenopathy. Skin:     General: Skin is warm and dry. Capillary Refill: Capillary refill takes less than 2 seconds. Coloration: Skin is not jaundiced or pale. Findings: No bruising, erythema, lesion or rash. Neurological:      General: No focal deficit present. Mental Status: She is alert and oriented to person, place, and time. Mental status is at baseline. Cranial Nerves: No cranial nerve deficit. Sensory: No sensory deficit. Motor: No weakness or abnormal muscle tone. Coordination: Coordination normal.      Gait: Gait normal.      Deep Tendon Reflexes: Reflexes are normal and symmetric. Reflexes normal.   Psychiatric:         Mood and Affect: Mood normal.         Behavior: Behavior normal.         Thought Content:  Thought content normal.         Judgment: Judgment normal.         DIAGNOSTIC RESULTS     EKG: All EKG's are interpreted by the Emergency Department Physician who either signs or Co-signs this chart in the absence of a cardiologist.        RADIOLOGY:   Non-plain film images such as CT, Ultrasound and MRI are read by the radiologist. [facility] radiographicimages are visualized and preliminarily interpreted by the emergency physician with the below findings:        Interpretation per the Radiologist below, if available at the time of this note:    No orders to display         ED BEDSIDE ULTRASOUND:   Performed by ED Physician - none    LABS:  Labs Reviewed   URINALYSIS WITH REFLEX TO CULTURE - Abnormal; Notable for the following components:       Result Value    Blood, Urine TRACE (*)     All other components within normal limits   MICROSCOPIC URINALYSIS - Abnormal; Notable for the following components:    Bacteria, UA RARE (*)     All other components within normal limits   C.TRACHOMATIS N.GONORRHOEAE DNA, URINE   PREGNANCY, URINE       All other labs were within normal range or not returned as of this dictation. EMERGENCY DEPARTMENT COURSE and DIFFERENTIALDIAGNOSIS/MDM:   Vitals:    Vitals:    06/26/22 1649   BP: (!) 148/90   Pulse: 100   Resp: 18   Temp: 97.5 °F (36.4 °C)   TempSrc: Oral   SpO2: 98%   Weight: 190 lb (86.2 kg)   Height: 5' 10\" (1.778 m)           MDM  Number of Diagnoses or Management Options     Amount and/or Complexity of Data Reviewed  Clinical lab tests: ordered and reviewed    Risk of Complications, Morbidity, and/or Mortality  Presenting problems: moderate  Diagnostic procedures: moderate  Management options: moderate    Patient Progress  Patient progress: improved      CRITICAL CARE TIME   Total Critical Care time was  minutes, excluding separately reportable procedures.   There was a high probability of clinically significant/life threatening deterioration in the patient's condition which required my urgentintervention    CONSULTS:  None    PROCEDURES:  Unless otherwise noted below, none     Procedures    FINAL IMPRESSION      1.  Bartholin cyst          DISPOSITION/PLAN   DISPOSITION Decision To Discharge 06/26/2022 05:25:57 PM      PATIENT REFERRED TO:  Bala Villalpando DO  [address] [facility]  [phone]    In 1 day        DISCHARGE MEDICATIONS:  New Prescriptions    DOXYCYCLINE HYCLATE (VIBRA-TABS) 100 MG TABLET    Take 1 tablet by mouth 2 times daily for 10 days    KETOROLAC (TORADOL) 10 MG TABLET    Take 1 tablet by mouth every 6 hours as needed for Pain          (Please note that portions of this note were completed with a voice recognitionprogram.  Efforts were made to edit the dictations but occasionally words are mis-transcribed.)    Kathe Johnson MD (electronically signed)  Attending Emergency Physician          Kathe Johnson MD  06/26/22 MD Cristhian  06/26/22 9201

## 2025-05-18 LAB
Lab: NORMAL
NTRA FETAL FRACTION: NORMAL
NTRA GENDER OF FETUS: NORMAL
NTRA MONOSOMY X AGE-BASED RISK TEXT: NORMAL
NTRA MONOSOMY X RESULT TEXT: NORMAL
NTRA MONOSOMY X RISK SCORE TEXT: NORMAL
NTRA TRIPLOIDY RESULT TEXT: NORMAL
NTRA TRISOMY 13 AGE-BASED RISK TEXT: NORMAL
NTRA TRISOMY 13 RESULT TEXT: NORMAL
NTRA TRISOMY 13 RISK SCORE TEXT: NORMAL
NTRA TRISOMY 18 AGE-BASED RISK TEXT: NORMAL
NTRA TRISOMY 18 RESULT TEXT: NORMAL
NTRA TRISOMY 18 RISK SCORE TEXT: NORMAL
NTRA TRISOMY 21 AGE-BASED RISK TEXT: NORMAL
NTRA TRISOMY 21 RESULT TEXT: NORMAL
NTRA TRISOMY 21 RISK SCORE TEXT: NORMAL

## 2025-05-20 ENCOUNTER — ROUTINE PRENATAL (OUTPATIENT)
Age: 40
End: 2025-05-20
Payer: COMMERCIAL

## 2025-05-20 VITALS
HEIGHT: 62 IN | RESPIRATION RATE: 16 BRPM | WEIGHT: 136 LBS | DIASTOLIC BLOOD PRESSURE: 60 MMHG | TEMPERATURE: 97.3 F | BODY MASS INDEX: 25.03 KG/M2 | SYSTOLIC BLOOD PRESSURE: 120 MMHG | HEART RATE: 90 BPM

## 2025-05-20 DIAGNOSIS — O09.521 MULTIGRAVIDA OF ADVANCED MATERNAL AGE IN FIRST TRIMESTER: ICD-10-CM

## 2025-05-20 DIAGNOSIS — O09.299 HISTORY OF PRE-ECLAMPSIA IN PRIOR PREGNANCY, CURRENTLY PREGNANT: ICD-10-CM

## 2025-05-20 DIAGNOSIS — R00.0 TACHYCARDIA, UNSPECIFIED: ICD-10-CM

## 2025-05-20 DIAGNOSIS — Z3A.12 12 WEEKS GESTATION OF PREGNANCY: ICD-10-CM

## 2025-05-20 DIAGNOSIS — Z98.890 HISTORY OF LOOP ELECTRICAL EXCISION PROCEDURE (LEEP): ICD-10-CM

## 2025-05-20 DIAGNOSIS — Z87.59 HISTORY OF PRE-ECLAMPSIA: ICD-10-CM

## 2025-05-20 DIAGNOSIS — Z34.90 FIFTH PREGNANCY: ICD-10-CM

## 2025-05-20 DIAGNOSIS — O09.891 HX OF PRETERM DELIVERY, CURRENTLY PREGNANT, FIRST TRIMESTER: Primary | ICD-10-CM

## 2025-05-20 LAB — CYTOLOGY REPORT: NORMAL

## 2025-05-20 PROCEDURE — 76813 OB US NUCHAL MEAS 1 GEST: CPT | Performed by: OBSTETRICS & GYNECOLOGY

## 2025-05-20 PROCEDURE — 99203 OFFICE O/P NEW LOW 30 MIN: CPT | Performed by: OBSTETRICS & GYNECOLOGY

## 2025-05-20 PROCEDURE — 3078F DIAST BP <80 MM HG: CPT | Performed by: OBSTETRICS & GYNECOLOGY

## 2025-05-20 PROCEDURE — 3074F SYST BP LT 130 MM HG: CPT | Performed by: OBSTETRICS & GYNECOLOGY

## 2025-05-20 PROCEDURE — 76801 OB US < 14 WKS SINGLE FETUS: CPT | Performed by: OBSTETRICS & GYNECOLOGY

## 2025-05-20 NOTE — PROGRESS NOTES
Please refer to attached ultrasound report for doctor's evaluation of the clinical information obtained by vital signs, ultrasound, and/or non-stress test along with management recommendation.    
time was used to  the patient, discuss complications and problems related to her pregnancy, or coordinating her care in addition to the time spent interpreting the ultrasound. I answered all of her questions to her satisfaction.

## 2025-06-13 ENCOUNTER — ROUTINE PRENATAL (OUTPATIENT)
Dept: OBGYN CLINIC | Age: 40
End: 2025-06-13

## 2025-06-13 VITALS
HEART RATE: 79 BPM | SYSTOLIC BLOOD PRESSURE: 109 MMHG | BODY MASS INDEX: 24.87 KG/M2 | DIASTOLIC BLOOD PRESSURE: 71 MMHG | WEIGHT: 136 LBS

## 2025-06-13 DIAGNOSIS — Z34.82 ENCOUNTER FOR SUPERVISION OF OTHER NORMAL PREGNANCY IN SECOND TRIMESTER: Primary | ICD-10-CM

## 2025-06-13 DIAGNOSIS — Z34.90 FIFTH PREGNANCY: ICD-10-CM

## 2025-06-13 DIAGNOSIS — Z3A.15 15 WEEKS GESTATION OF PREGNANCY: ICD-10-CM

## 2025-06-13 DIAGNOSIS — K59.00 CONSTIPATION, UNSPECIFIED CONSTIPATION TYPE: ICD-10-CM

## 2025-06-13 PROBLEM — O09.93 HRP (HIGH RISK PREGNANCY), THIRD TRIMESTER: Status: RESOLVED | Noted: 2018-03-14 | Resolved: 2025-06-13

## 2025-06-13 PROBLEM — F90.2 ATTENTION DEFICIT HYPERACTIVITY DISORDER (ADHD), COMBINED TYPE: Status: RESOLVED | Noted: 2024-03-04 | Resolved: 2025-06-13

## 2025-06-13 PROBLEM — D64.9 ANEMIA: Status: RESOLVED | Noted: 2021-12-26 | Resolved: 2025-06-13

## 2025-06-13 PROBLEM — Z87.59 HISTORY OF PRE-ECLAMPSIA: Status: RESOLVED | Noted: 2025-05-09 | Resolved: 2025-06-13

## 2025-06-13 PROBLEM — R00.0 TACHYCARDIA, UNSPECIFIED: Status: RESOLVED | Noted: 2025-02-28 | Resolved: 2025-06-13

## 2025-06-13 PROBLEM — F32.81 PMDD (PREMENSTRUAL DYSPHORIC DISORDER): Status: RESOLVED | Noted: 2024-06-12 | Resolved: 2025-06-13

## 2025-06-13 PROBLEM — I95.1 ORTHOSTATIC INTOLERANCE: Status: RESOLVED | Noted: 2025-03-06 | Resolved: 2025-06-13

## 2025-06-13 PROBLEM — O13.9 GESTATIONAL HYPERTENSION: Status: RESOLVED | Noted: 2021-12-26 | Resolved: 2025-06-13

## 2025-06-13 PROBLEM — R53.83 OTHER FATIGUE: Status: RESOLVED | Noted: 2024-03-04 | Resolved: 2025-06-13

## 2025-06-13 PROBLEM — H52.203 MYOPIA OF BOTH EYES WITH ASTIGMATISM: Status: RESOLVED | Noted: 2017-04-25 | Resolved: 2025-06-13

## 2025-06-13 PROBLEM — G47.33 OBSTRUCTIVE SLEEP APNEA: Status: RESOLVED | Noted: 2024-03-04 | Resolved: 2025-06-13

## 2025-06-13 PROBLEM — F41.9 ANXIETY: Status: RESOLVED | Noted: 2021-12-26 | Resolved: 2025-06-13

## 2025-06-13 PROBLEM — T88.7XXA MEDICATION SIDE EFFECT: Status: RESOLVED | Noted: 2021-11-22 | Resolved: 2025-06-13

## 2025-06-13 PROBLEM — H52.13 MYOPIA OF BOTH EYES WITH ASTIGMATISM: Status: RESOLVED | Noted: 2017-04-25 | Resolved: 2025-06-13

## 2025-06-13 PROCEDURE — 3078F DIAST BP <80 MM HG: CPT | Performed by: STUDENT IN AN ORGANIZED HEALTH CARE EDUCATION/TRAINING PROGRAM

## 2025-06-13 PROCEDURE — 3074F SYST BP LT 130 MM HG: CPT | Performed by: STUDENT IN AN ORGANIZED HEALTH CARE EDUCATION/TRAINING PROGRAM

## 2025-06-13 PROCEDURE — 0502F SUBSEQUENT PRENATAL CARE: CPT | Performed by: STUDENT IN AN ORGANIZED HEALTH CARE EDUCATION/TRAINING PROGRAM

## 2025-06-13 RX ORDER — BISACODYL 10 MG
10 SUPPOSITORY, RECTAL RECTAL DAILY PRN
Qty: 30 SUPPOSITORY | Refills: 1 | Status: SHIPPED | OUTPATIENT
Start: 2025-06-13

## 2025-06-13 NOTE — PROGRESS NOTES
06/07/2021     Early 1 hr GTT **      Atypical glandular cells on cervical Pap smear 10/12/2020     EM[facility] & [place] 10/12/20       Return in about 4 weeks (around 7/11/2025) for DO Gilda Ramirez Ob/Gyn   6/13/2025, 11:41 AM

## 2025-06-16 ENCOUNTER — ROUTINE PRENATAL (OUTPATIENT)
Age: 40
End: 2025-06-16
Payer: COMMERCIAL

## 2025-06-16 VITALS
DIASTOLIC BLOOD PRESSURE: 62 MMHG | HEART RATE: 82 BPM | HEIGHT: 62 IN | BODY MASS INDEX: 26.13 KG/M2 | WEIGHT: 142 LBS | RESPIRATION RATE: 16 BRPM | TEMPERATURE: 98.3 F | SYSTOLIC BLOOD PRESSURE: 104 MMHG

## 2025-06-16 DIAGNOSIS — Z98.890 HX OF VENTRAL HERNIA REPAIR: ICD-10-CM

## 2025-06-16 DIAGNOSIS — Z34.90 FIFTH PREGNANCY: ICD-10-CM

## 2025-06-16 DIAGNOSIS — I10 ESSENTIAL HYPERTENSION: ICD-10-CM

## 2025-06-16 DIAGNOSIS — R03.0 ELEVATED BLOOD PRESSURE READING IN OFFICE WITHOUT DIAGNOSIS OF HYPERTENSION: ICD-10-CM

## 2025-06-16 DIAGNOSIS — Z87.19 HX OF VENTRAL HERNIA REPAIR: ICD-10-CM

## 2025-06-16 DIAGNOSIS — Z86.32 HISTORY OF GESTATIONAL DIABETES MELLITUS (GDM): ICD-10-CM

## 2025-06-16 DIAGNOSIS — Z3A.16 16 WEEKS GESTATION OF PREGNANCY: Primary | ICD-10-CM

## 2025-06-16 DIAGNOSIS — O09.891 HX OF PRETERM DELIVERY, CURRENTLY PREGNANT, FIRST TRIMESTER: ICD-10-CM

## 2025-06-16 DIAGNOSIS — O09.299 HISTORY OF PRE-ECLAMPSIA IN PRIOR PREGNANCY, CURRENTLY PREGNANT: ICD-10-CM

## 2025-06-16 DIAGNOSIS — O09.521 MULTIGRAVIDA OF ADVANCED MATERNAL AGE IN FIRST TRIMESTER: ICD-10-CM

## 2025-06-16 PROCEDURE — 76805 OB US >/= 14 WKS SNGL FETUS: CPT | Performed by: OBSTETRICS & GYNECOLOGY

## 2025-06-16 PROCEDURE — 99999 PR OFFICE/OUTPT VISIT,PROCEDURE ONLY: CPT | Performed by: OBSTETRICS & GYNECOLOGY

## 2025-06-16 PROCEDURE — 76817 TRANSVAGINAL US OBSTETRIC: CPT | Performed by: OBSTETRICS & GYNECOLOGY

## 2025-06-16 NOTE — PROGRESS NOTES
Please refer to attached ultrasound report for doctor's evaluation of the clinical information obtained by vital signs, ultrasound, and/or non-stress test along with management recommendation.   Visit Information    Have you changed or started any medications since your last visit including any over-the-counter medicines, vitamins, or herbal medicines? no   Have you stopped taking any of your medications? Is so, why? -  no  Are you having any side effects from any of your medications? - no    Have you seen any other physician or provider since your last visit?  no   Have you had any other diagnostic tests since your last visit?  no   Have you been seen in the emergency room and/or had an admission in a hospital since we last saw you?  no   Have you had your routine dental cleaning in the past 6 months?  no     Do you have an active MyChart account? If no, what is the barrier?   Yes    Patient Care Team:  Robin Cornejo PA-C as PCP - General (Family Medicine)  Robin Cornejo PA-C as PCP [facility]  Shaneka Joseph DPM as Physician (Podiatry)    Medical History Review  Past Medical, Family, and Social History reviewed and  contribute to the patient presenting condition    Health Maintenance   Topic Date Due    Hepatitis C screen  1963    HIV screen  09/26/1979    DTaP/Tdap/Td vaccine (1 - Tdap) 09/26/1983    Shingles Vaccine (1 of 2) 09/26/2014    Flu vaccine (1) 09/01/2019    Potassium monitoring  07/16/2020    Creatinine monitoring  07/16/2020    Colon cancer screen colonoscopy  09/17/2020    Lipid screen  04/17/2023    Pneumococcal 0-64 years Vaccine  Completed well-developed and well-nourished. HENT:   Head: Normocephalic and atraumatic. Cardiovascular: Normal rate, regular rhythm and normal heart sounds. No murmur heard. Pulmonary/Chest: Effort normal and breath sounds normal. No respiratory distress. He has no wheezes. He has no rales. Musculoskeletal: He exhibits no edema (LE). Ruptured superior biceps tendon present. Mm mass present distally upon flexion   Neurological: He is alert and oriented to person, place, and time. Skin: Skin is warm and dry. No rash noted. No erythema. No pallor. Psychiatric: He has a normal mood and affect. His behavior is normal. Judgment and thought content normal.   Nursing note and vitals reviewed. /80 (Site: Right Upper Arm, Position: Sitting, Cuff Size: Medium Adult)   Pulse 74   Temp 96.9 °F (36.1 °C) (Tympanic)   Ht 6' 3\" (1.905 m)   Wt 185 lb (83.9 kg)   SpO2 98%   BMI 23.12 kg/m²     Assessment:          Diagnosis Orders   1. Mixed hyperlipidemia  Lipid Panel   2. Rupture of right biceps tendon, initial encounter  Yvrose  Awais Flynn DO, Orthopedic Surgery, [facility]             Plan:      Return in about 6 months (around 1/24/2020) for HLD.     Need copy of lipids  Pt states may not have been a good fast. I did give him a new order to get another lab done fasting 10-12 hours  Cont present medication  Discussed low cholesterol diet  Referral to Dr. Shun Jo for biceps rupture  Pt agreed with treatment plan    Orders Placed This Encounter   Procedures    Lipid Panel     Standing Status:   Future     Standing Expiration Date:   7/24/2020     Order Specific Question:   Is Patient Fasting?/# of Hours     Answer:   yes   [id number], Abundio Bennett DO, Orthopedic Surgery, [facility]     Referral Priority:   Routine     Referral Type:   Eval and Treat     Referral Reason:   Specialty Services Required     Referred to Provider:   Richard Charles DO     Requested Specialty:   Orthopedic Surgery

## 2025-06-16 NOTE — PROGRESS NOTES
[facility] MATERNAL FETAL MED  [address]  [place] [address]  Dept: [phone]     2025    RE:  Maico Harrison     : 1985   AGE: 39 y.o.     Dear Dr. Gee,    Thank you for allowing me to see Maico Harrison.  As I'm sure you will recall, Maico Harrison is a 39 y.o. [id number]'s last menstrual period was 2025. Estimated Date of Delivery: 25 at 16w0d seen in our office today for the following:    REASON FOR VISIT: Greater than 14-week scan    Patient Active Problem List    Diagnosis Date Noted    Pregnancy 2025    16 weeks gestation of pregnancy 2025    Hx of  delivery, currently pregnant, first trimester 2025    Multigravida of advanced maternal age in first trimester 2025    Fifth pregnancy 2025    Essential hypertension 07/10/2024    Elevated blood pressure reading in office without diagnosis of hypertension 2024    Mild intermittent asthma with acute exacerbation 2024     21 F Apg 8/9 Wt 6#1 2021    Hx PPROM (G4) 2021    Hx of LEEP (2007) 2021    Hx of ventral hernia repair w/ mesh (2019) 2021    Hx of PreE w/ SF's (G2) 2021    Hx of GDMA1 (G2) 2021    Atypical glandular cells on cervical Pap smear 10/12/2020        PAST HISTORY:  OB History    Para Term  AB Living   5 3 2 1 1 3   SAB IAB Ectopic Molar Multiple Live Births   1    0 3      # Outcome Date GA Lbr Tulio/2nd Weight Sex Type Anes PTL Lv   5 Current            4  21 35w4d 03:31 / 00:03 2.755 kg (6 lb 1.2 oz) F Vag-Spont Nitrous Oxid Y REED      Birth Comments: B/l renal pyelectasis on fetal US   Fetal exposure to SSRI   Nl fetal echo   Maternal COVID infection during pregnancy (21)  Maternal steroids , , ,         Complications: Short cervix affecting pregnancy with delivery, History of  premature rupture

## 2025-06-30 ENCOUNTER — ROUTINE PRENATAL (OUTPATIENT)
Age: 40
End: 2025-06-30
Payer: COMMERCIAL

## 2025-06-30 VITALS
WEIGHT: 142 LBS | DIASTOLIC BLOOD PRESSURE: 66 MMHG | SYSTOLIC BLOOD PRESSURE: 116 MMHG | RESPIRATION RATE: 16 BRPM | BODY MASS INDEX: 26.13 KG/M2 | HEIGHT: 62 IN | TEMPERATURE: 98 F | HEART RATE: 74 BPM

## 2025-06-30 DIAGNOSIS — R03.0 ELEVATED BLOOD PRESSURE READING IN OFFICE WITHOUT DIAGNOSIS OF HYPERTENSION: ICD-10-CM

## 2025-06-30 DIAGNOSIS — I10 ESSENTIAL HYPERTENSION: ICD-10-CM

## 2025-06-30 DIAGNOSIS — Z3A.18 18 WEEKS GESTATION OF PREGNANCY: Primary | ICD-10-CM

## 2025-06-30 DIAGNOSIS — O09.891 HX OF PRETERM DELIVERY, CURRENTLY PREGNANT, FIRST TRIMESTER: ICD-10-CM

## 2025-06-30 DIAGNOSIS — Z34.90 FIFTH PREGNANCY: ICD-10-CM

## 2025-06-30 DIAGNOSIS — O09.521 MULTIGRAVIDA OF ADVANCED MATERNAL AGE IN FIRST TRIMESTER: ICD-10-CM

## 2025-06-30 DIAGNOSIS — Z87.59 HISTORY OF PRETERM PREMATURE RUPTURE OF MEMBRANES (PPROM): ICD-10-CM

## 2025-06-30 PROCEDURE — 99999 PR OFFICE/OUTPT VISIT,PROCEDURE ONLY: CPT | Performed by: OBSTETRICS & GYNECOLOGY

## 2025-06-30 PROCEDURE — 76817 TRANSVAGINAL US OBSTETRIC: CPT | Performed by: OBSTETRICS & GYNECOLOGY

## 2025-06-30 NOTE — PROGRESS NOTES
Please refer to attached ultrasound report for doctor's evaluation of the clinical information obtained by vital signs, ultrasound, and/or non-stress test along with management recommendation.  
presentation.  3.  The amniotic fluid volume is normal and the placenta is posterior fundal.    RECOMMENDATIONS:  Each of the recommendations were discussed with the patient:  1.  Continue serial cervical lengths to 24 weeks gestation.  2.   level 2 anatomy scan at 20 weeks with growth ultrasounds every 4 weeks thereafter.  3.  Begin weekly antepartum testing at 32 weeks gestation including nonstress test.  4.  Delivery between 37 and 39 weeks gestation depending on blood pressure control.  5.  Follow-up would be otherwise as clinically indicated.    The patient is to continue to follow with you in your office for ongoing obstetric care.     PLAN:    As noted above or sooner prn.    Sincerely,        Talat Diaz MD

## 2025-07-07 ENCOUNTER — ROUTINE PRENATAL (OUTPATIENT)
Dept: OBGYN CLINIC | Age: 40
End: 2025-07-07

## 2025-07-07 VITALS
DIASTOLIC BLOOD PRESSURE: 62 MMHG | BODY MASS INDEX: 25.86 KG/M2 | SYSTOLIC BLOOD PRESSURE: 117 MMHG | WEIGHT: 141.4 LBS | HEART RATE: 61 BPM

## 2025-07-07 DIAGNOSIS — Z3A.19 19 WEEKS GESTATION OF PREGNANCY: Primary | ICD-10-CM

## 2025-07-07 DIAGNOSIS — Z34.82 PRENATAL CARE, SUBSEQUENT PREGNANCY IN SECOND TRIMESTER: ICD-10-CM

## 2025-07-07 PROCEDURE — 3078F DIAST BP <80 MM HG: CPT | Performed by: STUDENT IN AN ORGANIZED HEALTH CARE EDUCATION/TRAINING PROGRAM

## 2025-07-07 PROCEDURE — 0502F SUBSEQUENT PRENATAL CARE: CPT | Performed by: STUDENT IN AN ORGANIZED HEALTH CARE EDUCATION/TRAINING PROGRAM

## 2025-07-07 PROCEDURE — 3074F SYST BP LT 130 MM HG: CPT | Performed by: STUDENT IN AN ORGANIZED HEALTH CARE EDUCATION/TRAINING PROGRAM

## 2025-07-07 NOTE — PROGRESS NOTES
Prenatal Visit    Maico Harrison is a 39 y.o. female  at 19w0d IUP    The patient was seen and evaluated. She has no complaints today. Reports positive fetal movements. She denies headache, vision changes, RUQ pain, contractions, vaginal bleeding and leakage of fluid.     The problem list reflects the active issues addressed during today's visit    Vitals:  BP: 117/62  Weight - Scale: 64.1 kg (141 lb 6.4 oz)  Pulse: 61  Patient Position: Sitting  Fetal HR: 155  Movement: Present     PHYSICAL:   General appearance: no apparent distress, alert and cooperative  HEENT: head atraumatic, normocephalic, trachea midline, moist mucous membranes   Neurologic: alert, oriented, normal speech   Lungs: no increased work of breathing,   Abdomen: soft, gravid, non-tender on palpation    Musculoskeletal: no gross abnormalities, range of motion appropriate for age   Psychiatric: mood appropriate, normal affect      Assessment & Plan:  Maico Harrison is a 39 y.o. female  at 19w0d IUP   - VSS   - 28 week labs to be ordered at next visit    - NIPT reviewed low risk    - A single marker MSAFP was ordered   - The patients anatomy ultrasound has been scheduled   - Continue taking prenatal vitamins QD    - Continue taking baby aspirin 81 mg QD    - Rhogam not indicated       Return in about 4 weeks (around 2025) for NIRAV.        No orders of the defined types were placed in this encounter.    Patient Active Problem List    Diagnosis Date Noted    Pregnancy 2025    16 weeks gestation of pregnancy 2025    Hx of  delivery, currently pregnant, first trimester 2025    Multigravida of advanced maternal age in first trimester 2025    Fifth pregnancy 2025    Essential hypertension 07/10/2024    Elevated blood pressure reading in office without diagnosis of hypertension 2024    Mild intermittent asthma with acute exacerbation 2024     21 F Apg 8/9 Wt 6#1 2021    Hx PPROM

## 2025-07-14 ENCOUNTER — ROUTINE PRENATAL (OUTPATIENT)
Age: 40
End: 2025-07-14

## 2025-07-14 VITALS
HEART RATE: 80 BPM | BODY MASS INDEX: 26.68 KG/M2 | SYSTOLIC BLOOD PRESSURE: 114 MMHG | RESPIRATION RATE: 16 BRPM | TEMPERATURE: 98.2 F | HEIGHT: 62 IN | DIASTOLIC BLOOD PRESSURE: 60 MMHG | WEIGHT: 145 LBS

## 2025-07-14 DIAGNOSIS — Z86.32 HISTORY OF GESTATIONAL DIABETES MELLITUS (GDM): ICD-10-CM

## 2025-07-14 DIAGNOSIS — O09.299 HISTORY OF PRE-ECLAMPSIA IN PRIOR PREGNANCY, CURRENTLY PREGNANT: ICD-10-CM

## 2025-07-14 DIAGNOSIS — Z34.90 FIFTH PREGNANCY: ICD-10-CM

## 2025-07-14 DIAGNOSIS — Z3A.20 20 WEEKS GESTATION OF PREGNANCY: Primary | ICD-10-CM

## 2025-07-14 DIAGNOSIS — Z87.59 HISTORY OF PRETERM PREMATURE RUPTURE OF MEMBRANES (PPROM): ICD-10-CM

## 2025-07-14 DIAGNOSIS — O09.521 MULTIGRAVIDA OF ADVANCED MATERNAL AGE IN FIRST TRIMESTER: ICD-10-CM

## 2025-07-14 DIAGNOSIS — O09.891 HX OF PRETERM DELIVERY, CURRENTLY PREGNANT, FIRST TRIMESTER: ICD-10-CM

## 2025-07-14 DIAGNOSIS — R03.0 ELEVATED BLOOD PRESSURE READING IN OFFICE WITHOUT DIAGNOSIS OF HYPERTENSION: ICD-10-CM

## 2025-07-14 NOTE — PROGRESS NOTES
[facility] MATERNAL FETAL MED  [address]  [place] [address]  Dept: [phone]     2025    RE:  Maico Harrison     : 1985   AGE: 39 y.o.     Dear Dr. Cuellar,    Thank you for allowing me to see Maico Harrison.  As I'm sure you will recall, Maico Harrison is a 39 y.o. [id number]'s last menstrual period was 2025. Estimated Date of Delivery: 25 at 20w0d seen in our office today for the following:    REASON FOR VISIT: Level II    Patient Active Problem List    Diagnosis Date Noted    Pregnancy 2025    20 weeks gestation of pregnancy 2025    Hx of  delivery, currently pregnant, first trimester 2025    Multigravida of advanced maternal age in first trimester 2025    Fifth pregnancy 2025    Essential hypertension 07/10/2024    Elevated blood pressure reading in office without diagnosis of hypertension 2024    Mild intermittent asthma with acute exacerbation 2024     21 F Apg 8/9 Wt 6#1 2021    Hx PPROM (G4) 2021    Hx of LEEP (2007) 2021    Hx of ventral hernia repair w/ mesh (2019) 2021    Hx of PreE w/ SF's (G2) 2021    Hx of GDMA1 (G2) 2021    Atypical glandular cells on cervical Pap smear 10/12/2020        PAST HISTORY:  OB History    Para Term  AB Living   5 3 2 1 1 3   SAB IAB Ectopic Molar Multiple Live Births   1    0 3      # Outcome Date GA Lbr Tulio/2nd Weight Sex Type Anes PTL Lv   5 Current            4  21 35w4d 03:31 / 00:03 2.755 kg (6 lb 1.2 oz) F Vag-Spont Nitrous Oxid Y REED      Birth Comments: B/l renal pyelectasis on fetal US   Fetal exposure to SSRI   Nl fetal echo   Maternal COVID infection during pregnancy (21)  Maternal steroids , , ,         Complications: Short cervix affecting pregnancy with delivery, History of  premature rupture of membranes

## 2025-07-29 ENCOUNTER — ROUTINE PRENATAL (OUTPATIENT)
Age: 40
End: 2025-07-29
Payer: COMMERCIAL

## 2025-07-29 VITALS
DIASTOLIC BLOOD PRESSURE: 70 MMHG | TEMPERATURE: 97.5 F | WEIGHT: 146 LBS | BODY MASS INDEX: 26.87 KG/M2 | RESPIRATION RATE: 16 BRPM | HEIGHT: 62 IN | HEART RATE: 82 BPM | SYSTOLIC BLOOD PRESSURE: 118 MMHG

## 2025-07-29 DIAGNOSIS — O09.892 HX OF PRETERM DELIVERY, CURRENTLY PREGNANT, SECOND TRIMESTER: ICD-10-CM

## 2025-07-29 DIAGNOSIS — O09.522 MULTIGRAVIDA OF ADVANCED MATERNAL AGE IN SECOND TRIMESTER: ICD-10-CM

## 2025-07-29 DIAGNOSIS — I10 ESSENTIAL HYPERTENSION: ICD-10-CM

## 2025-07-29 DIAGNOSIS — Z3A.22 22 WEEKS GESTATION OF PREGNANCY: Primary | ICD-10-CM

## 2025-07-29 DIAGNOSIS — Z34.90 FIFTH PREGNANCY: ICD-10-CM

## 2025-07-29 DIAGNOSIS — Z98.890 HISTORY OF LOOP ELECTRICAL EXCISION PROCEDURE (LEEP): ICD-10-CM

## 2025-07-29 DIAGNOSIS — Z87.59 HISTORY OF PRETERM PREMATURE RUPTURE OF MEMBRANES (PPROM): ICD-10-CM

## 2025-07-29 PROCEDURE — 76817 TRANSVAGINAL US OBSTETRIC: CPT | Performed by: OBSTETRICS & GYNECOLOGY

## 2025-07-29 PROCEDURE — 99999 PR OFFICE/OUTPT VISIT,PROCEDURE ONLY: CPT | Performed by: OBSTETRICS & GYNECOLOGY

## 2025-07-29 NOTE — PROGRESS NOTES
[facility] MATERNAL FETAL MED  [address]  [place] [address]  Dept: [phone]     2025    RE:  Maico Harrison     : 1985   AGE: 39 y.o.     Dear Dr. Velez,    Thank you for allowing me to see Maico Harrison.  As I'm sure you will recall, Maico Harrison is a 39 y.o. [id number]'s last menstrual period was 2025. Estimated Date of Delivery: 25 at 22w1d seen in our office today for the following:    REASON FOR VISIT: Cervical Length    Patient Active Problem List    Diagnosis Date Noted    Pregnancy 2025    20 weeks gestation of pregnancy 2025    Hx of  delivery, currently pregnant, first trimester 2025    Multigravida of advanced maternal age in first trimester 2025    Fifth pregnancy 2025    Essential hypertension 07/10/2024    Elevated blood pressure reading in office without diagnosis of hypertension 2024    Mild intermittent asthma with acute exacerbation 2024     21 F Apg 8/9 Wt 6#1 2021    Hx PPROM (G4) 2021    Hx of LEEP (2007) 2021    Hx of ventral hernia repair w/ mesh (2019) 2021    Hx of PreE w/ SF's (G2) 2021    Hx of GDMA1 (G2) 2021    Atypical glandular cells on cervical Pap smear 10/12/2020        PAST HISTORY:  OB History    Para Term  AB Living   5 3 2 1 1 3   SAB IAB Ectopic Molar Multiple Live Births   1    0 3      # Outcome Date GA Lbr Tulio/2nd Weight Sex Type Anes PTL Lv   5 Current            4  21 35w4d 03:31 / 00:03 2.755 kg (6 lb 1.2 oz) F Vag-Spont Nitrous Oxid Y REED      Birth Comments: B/l renal pyelectasis on fetal US   Fetal exposure to SSRI   Nl fetal echo   Maternal COVID infection during pregnancy (21)  Maternal steroids , , ,         Complications: Short cervix affecting pregnancy with delivery, History of  premature rupture of

## 2025-08-04 ENCOUNTER — ROUTINE PRENATAL (OUTPATIENT)
Dept: OBGYN CLINIC | Age: 40
End: 2025-08-04

## 2025-08-04 VITALS
BODY MASS INDEX: 26.81 KG/M2 | HEART RATE: 77 BPM | WEIGHT: 146.6 LBS | SYSTOLIC BLOOD PRESSURE: 114 MMHG | DIASTOLIC BLOOD PRESSURE: 76 MMHG

## 2025-08-04 DIAGNOSIS — O09.92 HIGH-RISK PREGNANCY IN SECOND TRIMESTER: ICD-10-CM

## 2025-08-04 DIAGNOSIS — Z3A.23 23 WEEKS GESTATION OF PREGNANCY: Primary | ICD-10-CM

## 2025-08-04 PROCEDURE — 0502F SUBSEQUENT PRENATAL CARE: CPT | Performed by: OBSTETRICS & GYNECOLOGY

## 2025-08-12 ENCOUNTER — ROUTINE PRENATAL (OUTPATIENT)
Age: 40
End: 2025-08-12
Payer: COMMERCIAL

## 2025-08-12 VITALS
RESPIRATION RATE: 16 BRPM | TEMPERATURE: 98 F | HEIGHT: 62 IN | DIASTOLIC BLOOD PRESSURE: 66 MMHG | HEART RATE: 78 BPM | BODY MASS INDEX: 27.05 KG/M2 | WEIGHT: 147 LBS | SYSTOLIC BLOOD PRESSURE: 114 MMHG

## 2025-08-12 DIAGNOSIS — Z87.59 HISTORY OF PRETERM PREMATURE RUPTURE OF MEMBRANES (PPROM): ICD-10-CM

## 2025-08-12 DIAGNOSIS — Z34.90 FIFTH PREGNANCY: ICD-10-CM

## 2025-08-12 DIAGNOSIS — Z3A.24 24 WEEKS GESTATION OF PREGNANCY: Primary | ICD-10-CM

## 2025-08-12 DIAGNOSIS — R03.0 ELEVATED BLOOD PRESSURE READING IN OFFICE WITHOUT DIAGNOSIS OF HYPERTENSION: ICD-10-CM

## 2025-08-12 DIAGNOSIS — O09.522 MULTIGRAVIDA OF ADVANCED MATERNAL AGE IN SECOND TRIMESTER: ICD-10-CM

## 2025-08-12 DIAGNOSIS — Z98.890 HISTORY OF LOOP ELECTRICAL EXCISION PROCEDURE (LEEP): ICD-10-CM

## 2025-08-12 DIAGNOSIS — I10 ESSENTIAL HYPERTENSION: ICD-10-CM

## 2025-08-12 DIAGNOSIS — O09.892 HX OF PRETERM DELIVERY, CURRENTLY PREGNANT, SECOND TRIMESTER: ICD-10-CM

## 2025-08-12 PROCEDURE — 76816 OB US FOLLOW-UP PER FETUS: CPT | Performed by: OBSTETRICS & GYNECOLOGY

## 2025-08-12 PROCEDURE — 99999 PR OFFICE/OUTPT VISIT,PROCEDURE ONLY: CPT | Performed by: OBSTETRICS & GYNECOLOGY

## 2025-08-12 PROCEDURE — 76817 TRANSVAGINAL US OBSTETRIC: CPT | Performed by: OBSTETRICS & GYNECOLOGY

## 2025-08-12 ASSESSMENT — PAIN SCALES - GENERAL: PAINLEVEL_OUTOF10: 0

## 2025-09-02 ENCOUNTER — ROUTINE PRENATAL (OUTPATIENT)
Dept: OBGYN CLINIC | Age: 40
End: 2025-09-02

## 2025-09-02 ENCOUNTER — HOSPITAL ENCOUNTER (OUTPATIENT)
Age: 40
Setting detail: SPECIMEN
Discharge: HOME | End: 2025-09-02

## 2025-09-02 VITALS
WEIGHT: 152.4 LBS | DIASTOLIC BLOOD PRESSURE: 80 MMHG | SYSTOLIC BLOOD PRESSURE: 127 MMHG | BODY MASS INDEX: 27.87 KG/M2 | HEART RATE: 85 BPM

## 2025-09-02 DIAGNOSIS — O10.919 CHRONIC HYPERTENSION AFFECTING PREGNANCY: ICD-10-CM

## 2025-09-02 DIAGNOSIS — O09.92 HIGH-RISK PREGNANCY IN SECOND TRIMESTER: ICD-10-CM

## 2025-09-02 DIAGNOSIS — Z3A.23 23 WEEKS GESTATION OF PREGNANCY: ICD-10-CM

## 2025-09-02 DIAGNOSIS — Z98.890 HISTORY OF LOOP ELECTRICAL EXCISION PROCEDURE (LEEP): ICD-10-CM

## 2025-09-02 DIAGNOSIS — Z86.32 HISTORY OF GESTATIONAL DIABETES MELLITUS (GDM): ICD-10-CM

## 2025-09-02 DIAGNOSIS — Z3A.27 27 WEEKS GESTATION OF PREGNANCY: Primary | ICD-10-CM

## 2025-09-02 DIAGNOSIS — Z87.59 HISTORY OF PRE-ECLAMPSIA: ICD-10-CM

## 2025-09-02 DIAGNOSIS — O09.529 AMA (ADVANCED MATERNAL AGE) MULTIGRAVIDA 35+, UNSPECIFIED TRIMESTER: ICD-10-CM

## 2025-09-02 LAB
AMOUNT GLUCOSE GIVEN: 50 G
BASOPHILS # BLD: 0.03 K/UL (ref 0–0.2)
BASOPHILS NFR BLD: 0 % (ref 0–2)
EOSINOPHIL # BLD: 0.24 K/UL (ref 0–0.44)
EOSINOPHILS RELATIVE PERCENT: 2 % (ref 1–4)
ERYTHROCYTE [DISTWIDTH] IN BLOOD BY AUTOMATED COUNT: 12.3 % (ref 11.8–14.4)
GLUCOSE 3H P 100 G GLC PO SERPL-MCNC: 163 MG/DL
HCT VFR BLD AUTO: 36.1 % (ref 36.3–47.1)
HGB BLD-MCNC: 12.2 G/DL (ref 11.9–15.1)
IMM GRANULOCYTES # BLD AUTO: 0.08 K/UL (ref 0–0.3)
IMM GRANULOCYTES NFR BLD: 1 %
LYMPHOCYTES NFR BLD: 1.77 K/UL (ref 1.1–3.7)
LYMPHOCYTES RELATIVE PERCENT: 17 % (ref 24–43)
MCH RBC QN AUTO: 33.1 PG (ref 25.2–33.5)
MCHC RBC AUTO-ENTMCNC: 33.8 G/DL (ref 28.4–34.8)
MCV RBC AUTO: 97.8 FL (ref 82.6–102.9)
MONOCYTES NFR BLD: 0.5 K/UL (ref 0.1–1.2)
MONOCYTES NFR BLD: 5 % (ref 3–12)
NEUTROPHILS NFR BLD: 76 % (ref 36–65)
NEUTS SEG NFR BLD: 8.06 K/UL (ref 1.5–8.1)
NRBC BLD-RTO: 0 PER 100 WBC
PLATELET # BLD AUTO: 197 K/UL (ref 138–453)
PMV BLD AUTO: 11.2 FL (ref 8.1–13.5)
RBC # BLD AUTO: 3.69 M/UL (ref 3.95–5.11)
T PALLIDUM AB SER QL IA: NONREACTIVE
WBC OTHER # BLD: 10.7 K/UL (ref 3.5–11.3)

## 2025-09-02 PROCEDURE — 0502F SUBSEQUENT PRENATAL CARE: CPT | Performed by: NURSE PRACTITIONER

## 2025-09-02 RX ORDER — ACETAMINOPHEN 325 MG/1
325 TABLET ORAL PRN
COMMUNITY

## (undated) DEVICE — TROCAR ENDOSCP L100MM DIA5MM BLDELSS STBL SL THRD OPT VW

## (undated) DEVICE — YANKAUER,BULB TIP,W/O VENT,RIGID,STERILE: Brand: MEDLINE

## (undated) DEVICE — DISSECTOR LAP DIA5MM BLNT TIP ENDOPATH

## (undated) DEVICE — ST. ANNE'S MULTI PORT PACK: Brand: MEDLINE INDUSTRIES, INC.

## (undated) DEVICE — ELECTRODE LAPAROSCOPIC LHOOK

## (undated) DEVICE — SKIN AFFIX SURG ADHESIVE 72/CS 0.55ML: Brand: MEDLINE

## (undated) DEVICE — BAG SPEC LAP H6IN DIA3IN 250ML 10 12MM CANN ATTCH MEM WIRE

## (undated) DEVICE — DRAPE,REIN 53X77,STERILE: Brand: MEDLINE

## (undated) DEVICE — INTENDED FOR TISSUE SEPARATION, AND OTHER PROCEDURES THAT REQUIRE A SHARP SURGICAL BLADE TO PUNCTURE OR CUT.: Brand: BARD-PARKER ® CARBON RIB-BACK BLADES

## (undated) DEVICE — Z DISCONTINUED PER MEDLINE USE 2741943 DRESSING AQUACEL 10 IN ALG W9XL25CM SIL CVR WTRPRF VIR BACT BARR ANTIMIC

## (undated) DEVICE — TOTAL TRAY, DB, 100% SILI FOLEY, 16FR 10: Brand: MEDLINE

## (undated) DEVICE — GLOVE SURG SZ 75 L12IN FNGR THK79MIL GRN LTX FREE

## (undated) DEVICE — CUTTER ENDOSCP L340MM LIN ARTC SGL STROKE FIRING ENDOPATH

## (undated) DEVICE — NON COATED ELECTROSURGICAL NEEDLE ELECTRODE, 2.75 INCH (7 CM): Brand: MEGADYNE

## (undated) DEVICE — YANKAUER,FLEXIBLE HANDLE,REGLR CAPACITY: Brand: MEDLINE INDUSTRIES, INC.

## (undated) DEVICE — INSUFFLATION NEEDLE TO ESTABLISH PNEUMOPERITONEUM.: Brand: INSUFFLATION NEEDLE

## (undated) DEVICE — GARMENT,MEDLINE,DVT,INT,CALF,MED, GEN2: Brand: MEDLINE

## (undated) DEVICE — APPLIER CLP M/L SHFT DIA5MM 15 LIG LIGAMAX 5

## (undated) DEVICE — TROCAR: Brand: KII FIOS FIRST ENTRY

## (undated) DEVICE — SYRINGE, LUER LOCK, 10ML: Brand: MEDLINE

## (undated) DEVICE — TUBING, SUCTION, 1/4" X 12', STRAIGHT: Brand: MEDLINE

## (undated) DEVICE — ARM DRAPE

## (undated) DEVICE — GLOVE SURG SZ 75 CRM LTX FREE POLYISOPRENE POLYMER BEAD ANTI

## (undated) DEVICE — ADHESIVE SKIN CLOSURE TOP 36 CC HI VISC DERMBND MINI

## (undated) DEVICE — CANNULA SEAL

## (undated) DEVICE — NEEDLE INSUF L120MM DIA2MM DISP FOR PNEUMOPERI ENDOPATH

## (undated) DEVICE — TIP COVER ACCESSORY

## (undated) DEVICE — Device

## (undated) DEVICE — SYRINGE IRRIG 60ML SFT PLIABLE BLB EZ TO GRP 1 HND USE W/

## (undated) DEVICE — SHEARS SURG L36CM DIA5MM CRV BLDE W/ PSTL HNDLE HND CTRL

## (undated) DEVICE — TROCAR ENDOSCP L100MM DIA12MM BLDELSS OBT RADLUC STBL SL

## (undated) DEVICE — TROCARS: Brand: KII® BALLOON BLUNT TIP SYSTEM

## (undated) DEVICE — BLADELESS OBTURATOR: Brand: WECK VISTA

## (undated) DEVICE — PENROSE DRAIN 18 X .5" SILICONE: Brand: MEDLINE

## (undated) DEVICE — RELOAD STPL H1-2.5X45MM VASC THN TISS WHT 6 ROW B FRM SGL

## (undated) DEVICE — BLANKET WRM W29.9XL79.1IN UP BODY FORC AIR MISTRAL-AIR

## (undated) DEVICE — SUTURE ETHLN SZ 4-0 L18IN NONABSORBABLE BLK L19MM PS-2 3/8 1667H

## (undated) DEVICE — LAPAROSCOPIC SCISSORS: Brand: EPIX LAPAROSCOPIC SCISSORS

## (undated) DEVICE — KIT,ANTI FOG,W/SPONGE & FLUID,SOFT PACK: Brand: MEDLINE

## (undated) DEVICE — Z DISCONTINUED USE 2624853 GLOVE SURG SZ 75 L12IN THK91MIL BRN LTX FREE

## (undated) DEVICE — GOWN,SIRUS,NON REINFRCD,LARGE,SET IN SL: Brand: MEDLINE

## (undated) DEVICE — ELECTRO LUBE IS A SINGLE PATIENT USE DEVICE THAT IS INTENDED TO BE USED ON ELECTROSURGICAL ELECTRODES TO REDUCE STICKING.: Brand: KEY SURGICAL ELECTRO LUBE

## (undated) DEVICE — SOLUTION IV 1000ML 0.9% SOD CHL PH 5 INJ USP VIAFLX PLAS

## (undated) DEVICE — INSTRUMENT REPROC SEAL/DIVIDE LAP BLUNT TP LIGASURE NANO-COAT 5MMX37CM

## (undated) DEVICE — SUTURE MCRYL SZ 4-0 L27IN ABSRB UD L19MM PS-2 1/2 CIR PRIM Y426H